# Patient Record
Sex: FEMALE | Race: BLACK OR AFRICAN AMERICAN | Employment: FULL TIME | URBAN - METROPOLITAN AREA
[De-identification: names, ages, dates, MRNs, and addresses within clinical notes are randomized per-mention and may not be internally consistent; named-entity substitution may affect disease eponyms.]

---

## 2017-01-12 NOTE — TELEPHONE ENCOUNTER
She was here in November to see Juanpablo Skinner and this med was started.  She is due in for follow up before any add'l refills-this appt needs to be scheduled with MD

## 2017-01-18 NOTE — TELEPHONE ENCOUNTER
She is due back in for follow up within the next 30 days.  Was in to see Sean for consult in November

## 2017-01-19 ENCOUNTER — TELEPHONE (OUTPATIENT)
Dept: FAMILY MEDICINE CLINIC | Age: 57
End: 2017-01-19

## 2017-01-19 RX ORDER — GLIMEPIRIDE 2 MG/1
2 TABLET ORAL DAILY
Qty: 30 TAB | Refills: 0 | Status: SHIPPED | OUTPATIENT
Start: 2017-01-19 | End: 2017-02-14 | Stop reason: SDUPTHER

## 2017-01-19 RX ORDER — GLIMEPIRIDE 4 MG/1
4 TABLET ORAL DAILY
Qty: 30 TAB | Refills: 0 | Status: SHIPPED | OUTPATIENT
Start: 2017-01-19 | End: 2017-02-14 | Stop reason: DRUGHIGH

## 2017-01-23 NOTE — TELEPHONE ENCOUNTER
Patient has called regarding needing a new prescription for the Jardiance 10 mgs. She states the medication is doing great. Asked her to correspond with Naman Subramanian on My Chart. Patient states she has not been on My Chart but will go on the site this evening after her work day and access the messages from Jeffry Clark. She will also need to schedule a return appt. , with Dr. Dominic Whiteside. Can med refill be done until we can get the patient back in with Dr. Dominic Whiteside, as there are no appts. , available at present for 30 min. Patient #[phone].

## 2017-01-26 NOTE — TELEPHONE ENCOUNTER
Insurance requires a 90 day supply-they will not cover 30.  She is due in now for follow up and this appt needs to be set up  See note from [place] on this same request

## 2017-01-26 NOTE — TELEPHONE ENCOUNTER
Patient calling to say she need a 90 day supply of her jardiance because her insurance will not pay for a 30 day supply. Her contact # is [phone].

## 2017-01-27 NOTE — TELEPHONE ENCOUNTER
Patient has made a follow up appt and her insurance will only cover if done for 90 days-phoned to [facility]

## 2017-02-14 RX ORDER — GLIMEPIRIDE 2 MG/1
TABLET ORAL
Qty: 30 TAB | Refills: 0 | Status: SHIPPED | OUTPATIENT
Start: 2017-02-14 | End: 2017-03-13 | Stop reason: DRUGHIGH

## 2017-03-13 ENCOUNTER — OFFICE VISIT (OUTPATIENT)
Dept: FAMILY MEDICINE CLINIC | Age: 57
End: 2017-03-13

## 2017-03-13 VITALS
WEIGHT: 161.8 LBS | BODY MASS INDEX: 24.52 KG/M2 | SYSTOLIC BLOOD PRESSURE: 134 MMHG | HEART RATE: 72 BPM | DIASTOLIC BLOOD PRESSURE: 87 MMHG | TEMPERATURE: 97.9 F | RESPIRATION RATE: 16 BRPM | HEIGHT: 68 IN | OXYGEN SATURATION: 99 %

## 2017-03-13 DIAGNOSIS — E11.65 UNCONTROLLED TYPE 2 DIABETES MELLITUS WITH HYPERGLYCEMIA, WITHOUT LONG-TERM CURRENT USE OF INSULIN (HCC): Primary | ICD-10-CM

## 2017-03-13 LAB — HBA1C MFR BLD HPLC: 6.6 %

## 2017-03-13 RX ORDER — GLIMEPIRIDE 4 MG/1
TABLET ORAL
COMMUNITY
End: 2017-04-07 | Stop reason: SDUPTHER

## 2017-03-13 NOTE — MR AVS SNAPSHOT
Visit Information Date & Time Provider Department Dept. Phone Encounter #  
 3/13/2017 12:15 PM Puneet Leal MD [facility] [phone] Follow-up Instructions Return in about 6 months (around 9/13/2017). Upcoming Health Maintenance Date Due  
 FOOT EXAM Q1 6/11/2017* EYE EXAM RETINAL OR DILATED Q1 6/11/2017* DTaP/Tdap/Td series (1 - Tdap) 6/16/2018* HEMOGLOBIN A1C Q6M 5/21/2017 MICROALBUMIN Q1 6/22/2017 LIPID PANEL Q1 6/22/2017 BREAST CANCER SCRN MAMMOGRAM 7/1/2018 PAP AKA CERVICAL CYTOLOGY 12/17/2018 COLONOSCOPY 1/4/2020 *Topic was postponed. The date shown is not the original due date. Allergies as of 3/13/2017  Review Complete On: 3/13/2017 By: Sheryle Kill, RN No Known Allergies Current Immunizations  Reviewed on 11/21/2016 Name Date Influenza Vaccine (Quad) PF 11/21/2016 TD Vaccine 6/16/2008 Not reviewed this visit You Were Diagnosed With   
  
 Codes Comments Uncontrolled type 2 diabetes mellitus with hyperglycemia, without long-term current use of insulin ([facility]    -  Primary ICD-10-CM: E11.65 ICD-9-CM: 250.02 Vitals BP Pulse Temp Resp Height(growth percentile) Weight(growth percentile) 134/87 (BP 1 Location: Left arm, BP Patient Position: Sitting) 72 97.9 °F (36.6 °C) (Oral) 16 5' 8\" (1.727 m) 161 lb 12.8 oz (73.4 kg) LMP SpO2 BMI OB Status Smoking Status 05/31/2010 99% 24.6 kg/m2 Postmenopausal Never Smoker Vitals History BMI and BSA Data Body Mass Index Body Surface Area  
 24.6 kg/m 2 1.88 m 2 Preferred Pharmacy Pharmacy Name Phone [id number] [id number] [place] [address] [phone] Your Updated Medication List  
  
   
This list is accurate as of: 3/13/17 12:55 PM.  Always use your most recent med list.  
  
  
  
  
 AMARYL 4 mg tablet Generic drug:  glimepiride Take  by mouth every morning. cyclobenzaprine 5 mg tablet Commonly known as:  FLEXERIL Take 2 Tabs by mouth three (3) times daily (with meals). diphenhydrAMINE 25 mg tablet Commonly known as:  BENADRYL Take 50 mg by mouth every six (6) hours as needed. empagliflozin 10 mg tablet Commonly known as:  Earlis Bone Take 1 Tab by mouth daily. enalapril 10 mg tablet Commonly known as:  VASOTEC  
TAKE 1 TABLET BY MOUTH DAILY  
  
 fluticasone 50 mcg/actuation nasal spray Commonly known as:  FLONASE  
2 sprays per nostril daily  
  
 metFORMIN 500 mg tablet Commonly known as:  GLUCOPHAGE  
TAKE 2 TABLETS BY MOUTH TWICE A DAY WITH MEALS  
  
 OTHER Glucometer test strips-test 2-3 times per week  
  
 triamterene-hydroCHLOROthiazide 37.5-25 mg per tablet Commonly known as:  Brigido Moris TAKE 1 TABLET BY MOUTH DAILY  
  
 VITAMIN D3 1,000 unit Cap Generic drug:  cholecalciferol Take 1,000 Units by mouth daily. We Performed the Following AMB POC HEMOGLOBIN A1C [93563 CPT(R)] Follow-up Instructions Return in about 6 months (around 9/13/2017). Introducing [facility] & HEALTH SERVICES! Dear Olesya Gunn: Thank you for requesting a [employer] account. Our records indicate that you already have an active Qualtrics account. You can access your account anytime at [email or web address] Did you know that you can access your hospital and ER discharge instructions at any time in [employer]? You can also review all of your test results from your hospital stay or ER visit. Additional Information If you have questions, please visit the Frequently Asked Questions section of the Qualt[employer] website at [email or web address] Remember, Qualtrics is NOT to be used for urgent needs. For medical emergencies, dial 911. Now available from your iPhone and Android! Please provide this summary of care documentation to your next provider. Your primary care clinician is listed as [id number] If you have any questions after today's visit, please call [phone].

## 2017-03-13 NOTE — PROGRESS NOTES
Chief Complaint   Patient presents with    Follow-up     Last saw Raphael Bain. Blood sugar at home are 95 to 120. She feels the new medicine [facility] is working well. 1. Have you been to the ER, urgent care clinic since your last visit? Hospitalized since your last visit? No    2. Have you seen or consulted any other health care providers outside of the [address] [place] [address] since your last visit? Include any pap smears or colon screening. No     The patient was counseled on the dangers of tobacco use, and Patient is a non smoker. Reviewed strategies to maximize success, including Continue not to smoke. I have reviewed Health Maintenance with the patient and updated. Advance Care Planning information reviewed and given to the patient.

## 2017-03-13 NOTE — PROGRESS NOTES
Nurse history read and confirmed by patient. Visit Vitals    /87 (BP 1 Location: Left arm, BP Patient Position: Sitting)    Pulse 72    Temp 97.9 °F (36.6 °C) (Oral)    Resp 16    Ht 5' 8\" (1.727 m)    Wt 161 lb 12.8 oz (73.4 kg)    LMP 05/31/2010    SpO2 99%    BMI 24.6 kg/m2       Patient alert and cooperative. Reviewed in office A1c result. Assessment:  1. AODM, controlled. Plan:  1. Recheck in six months. 2. Follow otherwise here prn.  3. Continue current meds at current doses.

## 2017-04-03 RX ORDER — METFORMIN HYDROCHLORIDE 500 MG/1
TABLET ORAL
Qty: 360 TAB | Refills: 0 | Status: SHIPPED | OUTPATIENT
Start: 2017-04-03 | End: 2017-07-05 | Stop reason: SDUPTHER

## 2017-04-07 RX ORDER — GLIMEPIRIDE 2 MG/1
TABLET ORAL
Qty: 90 TAB | Refills: 0 | Status: SHIPPED | OUTPATIENT
Start: 2017-04-07 | End: 2017-07-05 | Stop reason: SDUPTHER

## 2017-04-07 NOTE — TELEPHONE ENCOUNTER
She was just in the office last month and her A1c was much improved.  She is due back in for full annual labs by the end of June

## 2017-04-24 NOTE — TELEPHONE ENCOUNTER
Blood sugar was much better controlled on March A1c on this rx-office visit note from March said recheck in 6 months

## 2017-04-25 RX ORDER — EMPAGLIFLOZIN 10 MG/1
TABLET, FILM COATED ORAL
Qty: 90 TAB | Refills: 1 | Status: SHIPPED | OUTPATIENT
Start: 2017-04-25 | End: 2017-10-16 | Stop reason: SDUPTHER

## 2017-07-05 NOTE — TELEPHONE ENCOUNTER
She was in the office in March and is due back for DM check in September.  Needs to come fasting for this appointment since annual labs are due

## 2017-07-06 NOTE — TELEPHONE ENCOUNTER
Left message on voicemail to call back-per the tech at [facility] she has not filled the 4 mg glimipride recently

## 2017-07-10 RX ORDER — GLIMEPIRIDE 2 MG/1
TABLET ORAL
Qty: 90 TAB | Refills: 0 | OUTPATIENT
Start: 2017-07-10 | End: 2017-10-16 | Stop reason: SDUPTHER

## 2017-07-10 RX ORDER — METFORMIN HYDROCHLORIDE 500 MG/1
TABLET ORAL
Qty: 360 TAB | Refills: 0 | OUTPATIENT
Start: 2017-07-10 | End: 2017-10-16 | Stop reason: SDUPTHER

## 2017-10-16 ENCOUNTER — OFFICE VISIT (OUTPATIENT)
Dept: FAMILY MEDICINE CLINIC | Age: 57
End: 2017-10-16

## 2017-10-16 VITALS
WEIGHT: 151.6 LBS | OXYGEN SATURATION: 100 % | HEIGHT: 68 IN | TEMPERATURE: 98.1 F | RESPIRATION RATE: 16 BRPM | BODY MASS INDEX: 22.97 KG/M2 | HEART RATE: 66 BPM | SYSTOLIC BLOOD PRESSURE: 123 MMHG | DIASTOLIC BLOOD PRESSURE: 78 MMHG

## 2017-10-16 DIAGNOSIS — I10 ESSENTIAL HYPERTENSION: ICD-10-CM

## 2017-10-16 DIAGNOSIS — J06.9 UPPER RESPIRATORY TRACT INFECTION, UNSPECIFIED TYPE: ICD-10-CM

## 2017-10-16 DIAGNOSIS — E55.9 VITAMIN D DEFICIENCY: ICD-10-CM

## 2017-10-16 DIAGNOSIS — E11.65 UNCONTROLLED TYPE 2 DIABETES MELLITUS WITH HYPERGLYCEMIA, WITHOUT LONG-TERM CURRENT USE OF INSULIN (HCC): Primary | ICD-10-CM

## 2017-10-16 DIAGNOSIS — L81.9 HYPOPIGMENTATION: ICD-10-CM

## 2017-10-16 LAB
HBA1C MFR BLD HPLC: 7.2 %
S PYO AG THROAT QL: NEGATIVE
VALID INTERNAL CONTROL?: YES

## 2017-10-16 RX ORDER — GLIMEPIRIDE 2 MG/1
TABLET ORAL
Qty: 90 TAB | Refills: 0 | Status: SHIPPED | OUTPATIENT
Start: 2017-10-16 | End: 2018-01-18 | Stop reason: SDUPTHER

## 2017-10-16 RX ORDER — ENALAPRIL MALEATE 10 MG/1
TABLET ORAL
Qty: 90 TAB | Refills: 3 | Status: SHIPPED | OUTPATIENT
Start: 2017-10-16 | End: 2018-01-17 | Stop reason: SDUPTHER

## 2017-10-16 RX ORDER — TRIAMTERENE/HYDROCHLOROTHIAZID 37.5-25 MG
TABLET ORAL
Qty: 90 TAB | Refills: 3 | Status: SHIPPED | OUTPATIENT
Start: 2017-10-16 | End: 2017-10-18 | Stop reason: SDUPTHER

## 2017-10-16 RX ORDER — METFORMIN HYDROCHLORIDE 500 MG/1
TABLET ORAL
Qty: 360 TAB | Refills: 0 | Status: SHIPPED | OUTPATIENT
Start: 2017-10-16 | End: 2018-01-18 | Stop reason: SDUPTHER

## 2017-10-16 NOTE — LETTER
10/24/2017 2:58 PM 
 
Ms. Ellis Mooney [address] [phone] Dear Ellis Mooney: 
 
Please find your most recent results below. Resulted Orders AMB POC HEMOGLOBIN A1C Result Value Ref Range Hemoglobin A1c (POC) 7.2 % VITAMIN D, 25 HYDROXY Result Value Ref Range VITAMIN D, 25-HYDROXY 36.6 30.0 - 100.0 ng/mL Comment:  
   Vitamin D deficiency has been defined by the [address] [place] [address] practice guideline as a 
level of serum 25-OH vitamin D less than 20 ng/mL (1,2). The Endocrine Society went on to further define vitamin D 
insufficiency as a level between 21 and 29 ng/mL (2). 1. IOM ([place] of Medicine). 2010. Dietary reference 
   intakes for calcium and D. [facility]: The 
   [employer]. 2. [place] MF, Phuc NC, Rick MARK, et al. 
   Evaluation, treatment, and prevention of vitamin D 
   deficiency: an Endocrine Society clinical practice 
   guideline. JCEM. 2011 Jul; 96(7):1911-30. Narrative Performed at:  [address]  [id number] : Erica Whittaker MD, Phone:  [phone] CBC WITH AUTOMATED DIFF Result Value Ref Range WBC 3.8 3.4 - 10.8 x10E3/uL  
 RBC 3.86 3.77 - 5.28 x10E6/uL HGB 11.1 11.1 - 15.9 g/dL HCT 33.4 (L) 34.0 - 46.6 % MCV 87 79 - 97 fL  
 MCH 28.8 26.6 - 33.0 pg  
 MCHC 33.2 31.5 - 35.7 g/dL  
 RDW 13.7 12.3 - 15.4 % PLATELET 798 163 - 476 x10E3/uL NEUTROPHILS 57 Not Estab. % Lymphocytes 27 Not Estab. % MONOCYTES 13 Not Estab. % EOSINOPHILS 2 Not Estab. % BASOPHILS 1 Not Estab. %  
 ABS. NEUTROPHILS 2.2 1.4 - 7.0 x10E3/uL Abs Lymphocytes 1.0 0.7 - 3.1 x10E3/uL  
 ABS. MONOCYTES 0.5 0.1 - 0.9 x10E3/uL  
 ABS. EOSINOPHILS 0.1 0.0 - 0.4 x10E3/uL  
 ABS. BASOPHILS 0.0 0.0 - 0.2 x10E3/uL IMMATURE GRANULOCYTES 0 Not Estab. %  
 ABS. IMM. GRANS. 0.0 0.0 - 0.1 x10E3/uL Narrative Performed at:  [address]  [id number] : Hodan Allen MD, Phone:  [phone] TSH 3RD GENERATION Result Value Ref Range TSH 1.220 0.450 - 4.500 uIU/mL Narrative Performed at:  [address]  [id number] : Hodan Allen MD, Phone:  [phone] METABOLIC PANEL, COMPREHENSIVE Result Value Ref Range Glucose 141 (H) 65 - 99 mg/dL BUN 19 6 - 24 mg/dL Creatinine 0.81 0.57 - 1.00 mg/dL GFR est non-AA 80 >59 mL/min/1.73 GFR est AA 92 >59 mL/min/1.73  
 BUN/Creatinine ratio 23 9 - 23 Sodium 143 134 - 144 mmol/L Potassium 4.2 3.5 - 5.2 mmol/L Chloride 100 96 - 106 mmol/L  
 CO2 27 18 - 29 mmol/L Calcium 9.9 8.7 - 10.2 mg/dL Protein, total 6.8 6.0 - 8.5 g/dL Albumin 4.4 3.5 - 5.5 g/dL GLOBULIN, TOTAL 2.4 1.5 - 4.5 g/dL A-G Ratio 1.8 1.2 - 2.2 Bilirubin, total 0.5 0.0 - 1.2 mg/dL Alk. phosphatase 81 39 - 117 IU/L  
 AST (SGOT) 12 0 - 40 IU/L  
 ALT (SGPT) 9 0 - 32 IU/L Narrative Performed at:  [address]  [id number] : Hodan Allen MD, Phone:  [phone] LIPID PANEL Result Value Ref Range Cholesterol, total 165 100 - 199 mg/dL Triglyceride 60 0 - 149 mg/dL HDL Cholesterol 69 >39 mg/dL VLDL, calculated 12 5 - 40 mg/dL LDL, calculated 84 0 - 99 mg/dL Narrative Performed at:  [address]  [id number] : Hodan Allen MD, Phone:  [phone] AMB POC RAPID STREP A Result Value Ref Range VALID INTERNAL CONTROL POC Yes Group A Strep Ag Negative Negative CVD REPORT Result Value Ref Range INTERPRETATION Note Comment:  
   Medical Director's Note: This is an amended report on 
10/17/2017. The following panels were previously not 
reported: Albumin: Creatinine Ratio. Please review this report in its entirety, since changes may affect result 
interpretation(s) and/or treatment/follow-up suggestions. Supplement report is available. Narrative Performed at:  [address] [place] [address]  [id number] : Juan Serna PhD, Phone:  [phone] DIABETES PATIENT EDUCATION Result Value Ref Range PDF Image Not applicable Narrative Performed at:  [address] [place] [address]  [id number] : Juan Serna PhD, Phone:  [phone] RECOMMENDATIONS: 
Borderline low hemoglobin/hematocrit. Concentrated urine with expected glucosuria. The rest of your labs are ok. Please call me if you have any questions: [phone] Sincerely, Gee Lundy MD

## 2017-10-16 NOTE — LETTER
10/24/2017 2:59 PM 
 
Ms. Kieran Brown [address] [phone] Dear Kieran Brown: 
 
Please find your most recent results below. Resulted Orders AMB POC HEMOGLOBIN A1C Result Value Ref Range Hemoglobin A1c (POC) 7.2 % VITAMIN D, 25 HYDROXY Result Value Ref Range VITAMIN D, 25-HYDROXY 36.6 30.0 - 100.0 ng/mL Comment:  
   Vitamin D deficiency has been defined by the [address] [place] [address] practice guideline as a 
level of serum 25-OH vitamin D less than 20 ng/mL (1,2). The Endocrine Society went on to further define vitamin D 
insufficiency as a level between 21 and 29 ng/mL (2). 1. IOM ([place] of Medicine). 2010. Dietary reference 
   intakes for calcium and D. [facility]: The 
   [employer]. 2. [place] MF, Phuc NC, Rick MARK, et al. 
   Evaluation, treatment, and prevention of vitamin D 
   deficiency: an Endocrine Society clinical practice 
   guideline. JCEM. 2011 Jul; 96(7):1911-30. Narrative Performed at:  [address]  [id number] : Demario Varela MD, Phone:  [phone] CBC WITH AUTOMATED DIFF Result Value Ref Range WBC 3.8 3.4 - 10.8 x10E3/uL  
 RBC 3.86 3.77 - 5.28 x10E6/uL HGB 11.1 11.1 - 15.9 g/dL HCT 33.4 (L) 34.0 - 46.6 % MCV 87 79 - 97 fL  
 MCH 28.8 26.6 - 33.0 pg  
 MCHC 33.2 31.5 - 35.7 g/dL  
 RDW 13.7 12.3 - 15.4 % PLATELET 080 821 - 571 x10E3/uL NEUTROPHILS 57 Not Estab. % Lymphocytes 27 Not Estab. % MONOCYTES 13 Not Estab. % EOSINOPHILS 2 Not Estab. % BASOPHILS 1 Not Estab. %  
 ABS. NEUTROPHILS 2.2 1.4 - 7.0 x10E3/uL Abs Lymphocytes 1.0 0.7 - 3.1 x10E3/uL  
 ABS. MONOCYTES 0.5 0.1 - 0.9 x10E3/uL  
 ABS. EOSINOPHILS 0.1 0.0 - 0.4 x10E3/uL  
 ABS. BASOPHILS 0.0 0.0 - 0.2 x10E3/uL IMMATURE GRANULOCYTES 0 Not Estab. %  
 ABS. IMM. GRANS. 0.0 0.0 - 0.1 x10E3/uL Narrative Performed at:  [address]  [id number] : Ernesto Damon MD, Phone:  [phone] URINALYSIS W/ RFLX MICROSCOPIC Result Value Ref Range Specific Gravity      >=1.030 (A) 1.005 - 1.030  
 pH (UA) 6.0 5.0 - 7.5 Color Yellow Yellow Appearance Clear Clear Leukocyte Esterase Negative Negative Protein Negative Negative/Trace Glucose 3+ (A) Negative Ketone Negative Negative Blood Negative Negative Bilirubin Negative Negative Urobilinogen 0.2 0.2 - 1.0 mg/dL Nitrites Negative Negative Microscopic Examination Comment Comment:  
   Microscopic not indicated and not performed. Narrative Performed at:  [address]  [id number] : Ernesto Damon MD, Phone:  [phone] TSH 3RD GENERATION Result Value Ref Range TSH 1.220 0.450 - 4.500 uIU/mL Narrative Performed at:  [address]  [id number] : Ernesto Damon MD, Phone:  [phone] METABOLIC PANEL, COMPREHENSIVE Result Value Ref Range Glucose 141 (H) 65 - 99 mg/dL BUN 19 6 - 24 mg/dL Creatinine 0.81 0.57 - 1.00 mg/dL GFR est non-AA 80 >59 mL/min/1.73 GFR est AA 92 >59 mL/min/1.73  
 BUN/Creatinine ratio 23 9 - 23 Sodium 143 134 - 144 mmol/L Potassium 4.2 3.5 - 5.2 mmol/L Chloride 100 96 - 106 mmol/L  
 CO2 27 18 - 29 mmol/L Calcium 9.9 8.7 - 10.2 mg/dL Protein, total 6.8 6.0 - 8.5 g/dL Albumin 4.4 3.5 - 5.5 g/dL GLOBULIN, TOTAL 2.4 1.5 - 4.5 g/dL A-G Ratio 1.8 1.2 - 2.2 Bilirubin, total 0.5 0.0 - 1.2 mg/dL Alk. phosphatase 81 39 - 117 IU/L  
 AST (SGOT) 12 0 - 40 IU/L  
 ALT (SGPT) 9 0 - 32 IU/L Narrative Performed at:  [address]  [id number] : Ernesto Damon MD, Phone:  [phone] LIPID PANEL Result Value Ref Range Cholesterol, total 165 100 - 199 mg/dL Triglyceride 60 0 - 149 mg/dL HDL Cholesterol 69 >39 mg/dL VLDL, calculated 12 5 - 40 mg/dL LDL, calculated 84 0 - 99 mg/dL Narrative Performed at:  [address]  [id number] : Edson Foster MD, Phone:  [phone] MICROALBUMIN, UR, RAND W/ MICROALBUMIN/CREA RATIO Result Value Ref Range Creatinine, urine 96.5 Not Estab. mg/dL Microalbumin, urine 3.4 Not Estab. ug/mL Microalb/Creat ratio (ug/mg creat.) 3.5 0.0 - 30.0 mg/g creat Narrative Performed at:  [address]  [id number] : Edson Foster MD, Phone:  [phone] AMB POC RAPID STREP A Result Value Ref Range VALID INTERNAL CONTROL POC Yes Group A Strep Ag Negative Negative CVD REPORT Result Value Ref Range INTERPRETATION Note Comment:  
   Medical Director's Note: This is an amended report on 
10/17/2017. The following panels were previously not 
reported: Albumin: Creatinine Ratio. Please review this 
report in its entirety, since changes may affect result 
interpretation(s) and/or treatment/follow-up suggestions. Supplement report is available. Narrative Performed at:  [address] [place] [address]  [id number] : Juan Serna PhD, Phone:  [phone] DIABETES PATIENT EDUCATION Result Value Ref Range PDF Image Not applicable Narrative Performed at:  [address] [place] [address]  [id number] : Juan Serna PhD, Phone:  [phone] DIABETES PATIENT EDUCATION Result Value Ref Range PDF Image Not applicable Narrative Performed at:  [address] [place] [address]  [id number] : Juan Serna PhD, Phone:  [phone] RECOMMENDATIONS: 
 Borderline low hemoglobin/hematocrit and concentrated urine with expected glucosuria. The rest of your labs are ok. Please call me if you have any questions: [phone] Sincerely, Amy Bustillo MD

## 2017-10-16 NOTE — PROGRESS NOTES
Chief Complaint   Patient presents with    Diabetes     follow up     Tima Hughes is a 59 y.o. female that is here today for a follow up for diabetes, patient c/o rash on right ankle that is itchy x 1 year, also c/o runny nose and left ear pain since yesterday Agus 10/15. 1. Have you been to the ER, urgent care clinic since your last visit? Hospitalized since your last visit? No    2. Have you seen or consulted any other health care providers outside of the [address] [place] [address] since your last visit? Include any pap smears or colon screening.  No

## 2017-10-16 NOTE — MR AVS SNAPSHOT
Visit Information Date & Time Provider Department Dept. Phone Encounter #  
 10/16/2017 10:00 AM Ana Estes MD [facility] [phone] [id number] Follow-up Instructions Return in about 3 months (around 1/16/2018) for [place] A1c check. Upcoming Health Maintenance Date Due  
 FOOT EXAM Q1 12/8/2016 MICROALBUMIN Q1 6/22/2017 LIPID PANEL Q1 6/22/2017 INFLUENZA AGE 9 TO ADULT 8/1/2017 HEMOGLOBIN A1C Q6M 9/13/2017 EYE EXAM RETINAL OR DILATED Q1 11/16/2017* DTaP/Tdap/Td series (1 - Tdap) 6/16/2018* BREAST CANCER SCRN MAMMOGRAM 7/1/2018 PAP AKA CERVICAL CYTOLOGY 12/17/2018 COLONOSCOPY 1/4/2020 *Topic was postponed. The date shown is not the original due date. Allergies as of 10/16/2017  Review Complete On: 10/16/2017 By: Lori Harrell LPN No Known Allergies Current Immunizations  Reviewed on 11/21/2016 Name Date Influenza Vaccine (Quad) PF 11/21/2016 TD Vaccine 6/16/2008 Not reviewed this visit You Were Diagnosed With   
  
 Codes Comments Uncontrolled type 2 diabetes mellitus with hyperglycemia, without long-term current use of insulin ([facility]    -  Primary ICD-10-CM: E11.65 ICD-9-CM: 250.02 Upper respiratory tract infection, unspecified type     ICD-10-CM: J06.9 ICD-9-CM: 465.9 Essential hypertension     ICD-10-CM: I10 
ICD-9-CM: 401.9 Vitamin D deficiency     ICD-10-CM: E55.9 ICD-9-CM: 268.9 Hypopigmentation     ICD-10-CM: L81.9 ICD-9-CM: 709.00 Vitals BP Pulse Temp Resp Height(growth percentile) Weight(growth percentile) 123/78 (BP 1 Location: Left arm, BP Patient Position: Sitting) 66 98.1 °F (36.7 °C) (Oral) 16 5' 8\" (1.727 m) 151 lb 9.6 oz (68.8 kg) LMP SpO2 BMI OB Status Smoking Status 05/31/2010 100% 23.05 kg/m2 Postmenopausal Never Smoker Vitals History BMI and BSA Data Body Mass Index Body Surface Area  23.05 kg/m 2 1.82 m 2  
  
  
 Preferred Pharmacy Pharmacy Name Phone [facility] #[id number] [address] [address] [phone] Your Updated Medication List  
  
   
This list is accurate as of: 10/16/17 10:59 AM.  Always use your most recent med list.  
  
  
  
  
 diphenhydrAMINE 25 mg tablet Commonly known as:  BENADRYL Take 50 mg by mouth every six (6) hours as needed. empagliflozin 10 mg tablet Commonly known as:  JARDIANCE  
TAKE 1 TABLET BY MOUTH EVERY DAY  
  
 enalapril 10 mg tablet Commonly known as:  VASOTEC  
TAKE 1 TABLET BY MOUTH DAILY  
  
 fluticasone 50 mcg/actuation nasal spray Commonly known as:  FLONASE  
2 sprays per nostril daily  
  
 glimepiride 2 mg tablet Commonly known as:  AMARYL  
TAKE 1 TAB BY MOUTH DAILY. metFORMIN 500 mg tablet Commonly known as:  GLUCOPHAGE  
TAKE 2 TABLETS BY MOUTH TWICE A DAY WITH MEALS  
  
 OTHER Glucometer test strips-test 2-3 times per week  
  
 triamterene-hydroCHLOROthiazide 37.5-25 mg per tablet Commonly known as:  Sherry Caal TAKE 1 TABLET BY MOUTH DAILY  
  
 VITAMIN D3 1,000 unit Cap Generic drug:  cholecalciferol Take 1,000 Units by mouth daily. Prescriptions Sent to Pharmacy Refills  
 metFORMIN (GLUCOPHAGE) 500 mg tablet 0 Sig: TAKE 2 TABLETS BY MOUTH TWICE A DAY WITH MEALS Class: Normal  
 Pharmacy: [facility]/pharmacy #[id number] Ph #: [phone]  
 glimepiride (AMARYL) 2 mg tablet 0 Sig: TAKE 1 TAB BY MOUTH DAILY. Class: Normal  
 Pharmacy: [facility]/pharmacy #[id number] Ph #: [phone]  
 empagliflozin (JARDIANCE) 10 mg tablet 0 Sig: TAKE 1 TABLET BY MOUTH EVERY DAY  Class: Normal  
 Pharmacy: [facility]/pharmacy #[id number] Ph #: [phone]  
 triamterene-hydroCHLOROthiazide (MAXZIDE) 37.5-25 mg per tablet 3  
 Sig: TAKE 1 TABLET BY MOUTH DAILY Class: Normal  
 Pharmacy: [facility]/pharmacy #[id number]- [place], [address] [facility] Ph #: [phone]  
 enalapril (VASOTEC) 10 mg tablet 3 Sig: TAKE 1 TABLET BY MOUTH DAILY Class: Normal  
 Pharmacy: [address] [place] [address] , [address] [facility] Ph #: [phone] We Performed the Following AMB POC HEMOGLOBIN A1C [32014 CPT(R)] AMB POC RAPID STREP A [81860 CPT(R)] CBC WITH AUTOMATED DIFF [45875 CPT(R)] LIPID PANEL [70873 CPT(R)] METABOLIC PANEL, COMPREHENSIVE [28006 CPT(R)] MICROALBUMIN, UR, RAND W/ MICROALBUMIN/CREA RATIO [id number] CPT(R)] REFERRAL TO DERMATOLOGY [REF19 Custom] TSH 3RD GENERATION [86252 CPT(R)] URINALYSIS W/ RFLX MICROSCOPIC [56719 CPT(R)] VITAMIN D, 25 HYDROXY [id number] CPT(R)] Follow-up Instructions Return in about 3 months (around 1/16/2018) for [place] A1c check. Referral Information Referral ID Referred By Referred To  
  
 [id number] Thomas Ge DO   
   [address] [facility] Phone: [phone] Fax: [phone] Visits Status Start Date End Date 1 New Request 10/16/17 10/16/18 If your referral has a status of pending review or denied, additional information will be sent to support the outcome of this decision. Introducing [facility] & HEALTH SERVICES! Dear Olesya Gunn: Thank you for requesting a [employer] account. Our records indicate that you already have an active [employer] account. You can access your account anytime at [email or web address] Did you know that you can access your hospital and ER discharge instructions at any time in [employer]? You can also review all of your test results from your hospital stay or ER visit. Additional Information If you have questions, please visit the Frequently Asked Questions section of the [employer] customer insight website at [email or web address] Remember, [employer] customer insight is NOT to be used for urgent needs. For medical emergencies, dial 911. Now available from your iPhone and Android! Please provide this summary of care documentation to your next provider. Your primary care clinician is listed as [address]. If you have any questions after today's visit, please call [phone].

## 2017-10-16 NOTE — PROGRESS NOTES
Pt admits to cheating on diet. Will work on it. Right lower leg area present past yr. Itching past month. Notes nasal congestion, ST, left ear pain, cough since past Friday. Clear mucus. Visit Vitals    /78 (BP 1 Location: Left arm, BP Patient Position: Sitting)    Pulse 66    Temp 98.1 °F (36.7 °C) (Oral)    Resp 16    Ht 5' 8\" (1.727 m)    Wt 151 lb 9.6 oz (68.8 kg)    LMP 05/31/2010    SpO2 100%    BMI 23.05 kg/m2       Patient alert and cooperative. Ear - left canal, TM clear. Mouth - posterior pharynx - anterior pillar erythema. Neck supple, anterior tenderness. Lungs clear. Heart regular. Assessment:  1. Probable URI. Plan:  1. Will check rapid strep. 2. Call if purulence, fever. 3. Reviewed today's A1c. Patient will work on diet. Return for nurse visit in three months for A1c check. 4. Will get annual labs today. 5. Refills done. 6. Follow otherwise here prn.

## 2017-10-17 LAB
25(OH)D3+25(OH)D2 SERPL-MCNC: 36.6 NG/ML (ref 30–100)
ALBUMIN SERPL-MCNC: 4.4 G/DL (ref 3.5–5.5)
ALBUMIN/CREAT UR: 3.5 MG/G CREAT (ref 0–30)
ALBUMIN/GLOB SERPL: 1.8 {RATIO} (ref 1.2–2.2)
ALP SERPL-CCNC: 81 IU/L (ref 39–117)
ALT SERPL-CCNC: 9 IU/L (ref 0–32)
APPEARANCE UR: CLEAR
AST SERPL-CCNC: 12 IU/L (ref 0–40)
BASOPHILS # BLD AUTO: 0 X10E3/UL (ref 0–0.2)
BASOPHILS NFR BLD AUTO: 1 %
BILIRUB SERPL-MCNC: 0.5 MG/DL (ref 0–1.2)
BILIRUB UR QL STRIP: NEGATIVE
BUN SERPL-MCNC: 19 MG/DL (ref 6–24)
BUN/CREAT SERPL: 23 (ref 9–23)
CALCIUM SERPL-MCNC: 9.9 MG/DL (ref 8.7–10.2)
CHLORIDE SERPL-SCNC: 100 MMOL/L (ref 96–106)
CHOLEST SERPL-MCNC: 165 MG/DL (ref 100–199)
CO2 SERPL-SCNC: 27 MMOL/L (ref 18–29)
COLOR UR: YELLOW
CREAT SERPL-MCNC: 0.81 MG/DL (ref 0.57–1)
CREAT UR-MCNC: 96.5 MG/DL
EOSINOPHIL # BLD AUTO: 0.1 X10E3/UL (ref 0–0.4)
EOSINOPHIL NFR BLD AUTO: 2 %
ERYTHROCYTE [DISTWIDTH] IN BLOOD BY AUTOMATED COUNT: 13.7 % (ref 12.3–15.4)
GLOBULIN SER CALC-MCNC: 2.4 G/DL (ref 1.5–4.5)
GLUCOSE SERPL-MCNC: 141 MG/DL (ref 65–99)
GLUCOSE UR QL: ABNORMAL
HCT VFR BLD AUTO: 33.4 % (ref 34–46.6)
HDLC SERPL-MCNC: 69 MG/DL
HGB BLD-MCNC: 11.1 G/DL (ref 11.1–15.9)
HGB UR QL STRIP: NEGATIVE
IMM GRANULOCYTES # BLD: 0 X10E3/UL (ref 0–0.1)
IMM GRANULOCYTES NFR BLD: 0 %
INTERPRETATION, 910389: NORMAL
KETONES UR QL STRIP: NEGATIVE
LDLC SERPL CALC-MCNC: 84 MG/DL (ref 0–99)
LEUKOCYTE ESTERASE UR QL STRIP: NEGATIVE
LYMPHOCYTES # BLD AUTO: 1 X10E3/UL (ref 0.7–3.1)
LYMPHOCYTES NFR BLD AUTO: 27 %
Lab: NORMAL
Lab: NORMAL
MCH RBC QN AUTO: 28.8 PG (ref 26.6–33)
MCHC RBC AUTO-ENTMCNC: 33.2 G/DL (ref 31.5–35.7)
MCV RBC AUTO: 87 FL (ref 79–97)
MICRO URNS: ABNORMAL
MICROALBUMIN UR-MCNC: 3.4 UG/ML
MONOCYTES # BLD AUTO: 0.5 X10E3/UL (ref 0.1–0.9)
MONOCYTES NFR BLD AUTO: 13 %
NEUTROPHILS # BLD AUTO: 2.2 X10E3/UL (ref 1.4–7)
NEUTROPHILS NFR BLD AUTO: 57 %
NITRITE UR QL STRIP: NEGATIVE
PH UR STRIP: 6 [PH] (ref 5–7.5)
PLATELET # BLD AUTO: 305 X10E3/UL (ref 150–379)
POTASSIUM SERPL-SCNC: 4.2 MMOL/L (ref 3.5–5.2)
PROT SERPL-MCNC: 6.8 G/DL (ref 6–8.5)
PROT UR QL STRIP: NEGATIVE
RBC # BLD AUTO: 3.86 X10E6/UL (ref 3.77–5.28)
SODIUM SERPL-SCNC: 143 MMOL/L (ref 134–144)
SP GR UR: >=1.03 (ref 1–1.03)
TRIGL SERPL-MCNC: 60 MG/DL (ref 0–149)
TSH SERPL DL<=0.005 MIU/L-ACNC: 1.22 UIU/ML (ref 0.45–4.5)
UROBILINOGEN UR STRIP-MCNC: 0.2 MG/DL (ref 0.2–1)
VLDLC SERPL CALC-MCNC: 12 MG/DL (ref 5–40)
WBC # BLD AUTO: 3.8 X10E3/UL (ref 3.4–10.8)

## 2017-10-18 RX ORDER — TRIAMTERENE/HYDROCHLOROTHIAZID 37.5-25 MG
TABLET ORAL
Qty: 90 TAB | Refills: 0 | Status: SHIPPED | OUTPATIENT
Start: 2017-10-18 | End: 2018-09-20 | Stop reason: SDUPTHER

## 2017-10-19 NOTE — PROGRESS NOTES
Voice mail message left for patient to return call. No PHI left on message. Upon call back patient to be notified per Dr. Evonne Babcock recommendation: Borderline low hemoglobin/hematocrit and concentrated urine with expected glucosuria. The rest of your labs are ok.

## 2017-10-24 NOTE — PROGRESS NOTES
Voice mail message left for patient to return call. No PHI left on message. Result letter mailed includes PCP recommendations.

## 2017-10-24 NOTE — PROGRESS NOTES
Voice mail message left for patient to return call. No PHI left on message.  Result letter mailed includes PCP recommendations

## 2018-01-17 RX ORDER — ENALAPRIL MALEATE 10 MG/1
TABLET ORAL
Qty: 90 TAB | Refills: 1 | Status: SHIPPED | OUTPATIENT
Start: 2018-01-17 | End: 2018-07-03 | Stop reason: SDUPTHER

## 2018-01-17 NOTE — TELEPHONE ENCOUNTER
PCP: Radha Peres MD    Last appt: 10/16/2017  Future Appointments  Date Time Provider Zack Davis   1/29/2018 9:30 AM Radha Peres MD [facility]       Requested Prescriptions     Pending Prescriptions Disp Refills    enalapril (VASOTEC) 10 mg tablet [Pharmacy Med Name: ENALAPRIL MALEATE 10 MG TAB] 90 Tab 1     Sig: TAKE 1 TABLET BY MOUTH EVERY DAY     Lab Results   Component Value Date/Time    Sodium 143 10/16/2017 11:06 AM    Potassium 4.2 10/16/2017 11:06 AM    Chloride 100 10/16/2017 11:06 AM    CO2 27 10/16/2017 11:06 AM    Anion gap 7 06/14/2010 09:48 AM    Glucose 141 10/16/2017 11:06 AM    BUN 19 10/16/2017 11:06 AM    Creatinine 0.81 10/16/2017 11:06 AM    BUN/Creatinine ratio 23 10/16/2017 11:06 AM    GFR est AA 92 10/16/2017 11:06 AM    GFR est non-AA 80 10/16/2017 11:06 AM    Calcium 9.9 10/16/2017 11:06 AM     Lab Results   Component Value Date/Time    Hemoglobin A1c 8.2 05/13/2015 08:17 AM    Hemoglobin A1c (POC) 7.2 10/16/2017 10:20 AM     Lab Results   Component Value Date/Time    Cholesterol, total 165 10/16/2017 11:06 AM    HDL Cholesterol 69 10/16/2017 11:06 AM    LDL, calculated 84 10/16/2017 11:06 AM    VLDL, calculated 12 10/16/2017 11:06 AM    Triglyceride 60 10/16/2017 11:06 AM    CHOL/HDL Ratio 2.4 06/14/2010 09:48 AM     Lab Results   Component Value Date/Time    TSH 1.220 10/16/2017 11:06 AM

## 2018-01-18 DIAGNOSIS — E11.65 UNCONTROLLED TYPE 2 DIABETES MELLITUS WITH HYPERGLYCEMIA, WITHOUT LONG-TERM CURRENT USE OF INSULIN (HCC): ICD-10-CM

## 2018-01-18 RX ORDER — GLIMEPIRIDE 2 MG/1
TABLET ORAL
Qty: 90 TAB | Refills: 0 | Status: SHIPPED | OUTPATIENT
Start: 2018-01-18 | End: 2018-02-20 | Stop reason: SDUPTHER

## 2018-01-18 RX ORDER — METFORMIN HYDROCHLORIDE 500 MG/1
TABLET ORAL
Qty: 360 TAB | Refills: 0 | Status: SHIPPED | OUTPATIENT
Start: 2018-01-18 | End: 2018-04-23 | Stop reason: SDUPTHER

## 2018-01-18 RX ORDER — EMPAGLIFLOZIN 10 MG/1
TABLET, FILM COATED ORAL
Qty: 90 TAB | Refills: 0 | Status: SHIPPED | OUTPATIENT
Start: 2018-01-18 | End: 2018-04-23 | Stop reason: SDUPTHER

## 2018-01-18 NOTE — TELEPHONE ENCOUNTER
PCP: Danielle Aragon MD    Last appt: 10/16/2017  Future Appointments  Date Time Provider Zack Davis   1/29/2018 9:30 AM Danielle Aragon MD [facility]       Requested Prescriptions     Pending Prescriptions Disp Refills    metFORMIN (GLUCOPHAGE) 500 mg tablet [Pharmacy Med Name: METFORMIN  MG TABLET] 360 Tab 0     Sig: TAKE 2 TABLETS BY MOUTH TWICE A DAY WITH MEALS    glimepiride (AMARYL) 2 mg tablet [Pharmacy Med Name: GLIMEPIRIDE 2 MG TABLET] 90 Tab 0     Sig: TAKE 1 TABLET BY MOUTH EVERY DAY    JARDIANCE 10 mg tablet [Pharmacy Med Name: Alfredo Naman 10 MG TABLET] 90 Tab 0     Sig: TAKE 1 TABLET BY MOUTH EVERY DAY     Lab Results   Component Value Date/Time    Sodium 143 10/16/2017 11:06 AM    Potassium 4.2 10/16/2017 11:06 AM    Chloride 100 10/16/2017 11:06 AM    CO2 27 10/16/2017 11:06 AM    Anion gap 7 06/14/2010 09:48 AM    Glucose 141 10/16/2017 11:06 AM    BUN 19 10/16/2017 11:06 AM    Creatinine 0.81 10/16/2017 11:06 AM    BUN/Creatinine ratio 23 10/16/2017 11:06 AM    GFR est AA 92 10/16/2017 11:06 AM    GFR est non-AA 80 10/16/2017 11:06 AM    Calcium 9.9 10/16/2017 11:06 AM     Lab Results   Component Value Date/Time    Hemoglobin A1c 8.2 05/13/2015 08:17 AM    Hemoglobin A1c (POC) 7.2 10/16/2017 10:20 AM     Lab Results   Component Value Date/Time    Cholesterol, total 165 10/16/2017 11:06 AM    HDL Cholesterol 69 10/16/2017 11:06 AM    LDL, calculated 84 10/16/2017 11:06 AM    VLDL, calculated 12 10/16/2017 11:06 AM    Triglyceride 60 10/16/2017 11:06 AM    CHOL/HDL Ratio 2.4 06/14/2010 09:48 AM     Lab Results   Component Value Date/Time    TSH 1.220 10/16/2017 11:06 AM

## 2018-01-29 ENCOUNTER — CLINICAL SUPPORT (OUTPATIENT)
Dept: FAMILY MEDICINE CLINIC | Age: 58
End: 2018-01-29

## 2018-01-29 VITALS
WEIGHT: 157.2 LBS | RESPIRATION RATE: 16 BRPM | HEIGHT: 68 IN | DIASTOLIC BLOOD PRESSURE: 82 MMHG | OXYGEN SATURATION: 99 % | SYSTOLIC BLOOD PRESSURE: 127 MMHG | BODY MASS INDEX: 23.82 KG/M2 | HEART RATE: 78 BPM | TEMPERATURE: 98.7 F

## 2018-01-29 DIAGNOSIS — E11.65 UNCONTROLLED TYPE 2 DIABETES MELLITUS WITH HYPERGLYCEMIA, WITHOUT LONG-TERM CURRENT USE OF INSULIN (HCC): Primary | ICD-10-CM

## 2018-01-29 LAB — HBA1C MFR BLD HPLC: 6.7 %

## 2018-01-29 NOTE — MR AVS SNAPSHOT
[facility] 
 
 
 [address] [facility] [address] 
[address] [place] [address] 
[phone] Patient: Memo Fletcher MRN:  [id number] Visit Information Date & Time Provider Department Dept. Phone Encounter #  
 1/29/2018  9:30 AM Chilo Valdez MD [facility] [phone] [id number] Follow-up Instructions Return in about 3 months (around 4/29/2018) for Wellness check. Upcoming Health Maintenance Date Due  
 FOOT EXAM Q1 12/8/2016 Influenza Age 5 to Adult 8/1/2017 EYE EXAM RETINAL OR DILATED Q1 4/29/2018* DTaP/Tdap/Td series (1 - Tdap) 6/16/2018* HEMOGLOBIN A1C Q6M 4/16/2018 MICROALBUMIN Q1 10/16/2018 LIPID PANEL Q1 10/16/2018 PAP AKA CERVICAL CYTOLOGY 12/17/2018 BREAST CANCER SCRN MAMMOGRAM 11/20/2019 COLONOSCOPY 1/4/2020 *Topic was postponed. The date shown is not the original due date. Allergies as of 1/29/2018  Review Complete On: 1/29/2018 By: Seamus Dietz RN No Known Allergies Current Immunizations  Reviewed on 11/21/2016 Name Date Influenza Vaccine (Quad) PF 11/21/2016 TD Vaccine 6/16/2008 Not reviewed this visit You Were Diagnosed With   
  
 Codes Comments Uncontrolled type 2 diabetes mellitus with hyperglycemia, without long-term current use of insulin ([facility]    -  Primary ICD-10-CM: E11.65 ICD-9-CM: 250.02 Vitals BP Pulse Temp Height(growth percentile) Weight(growth percentile) LMP  
 127/82 (BP 1 Location: Left arm, BP Patient Position: Sitting) 78 98.7 °F (37.1 °C) (Oral) 5' 8\" (1.727 m) 157 lb 3.2 oz (71.3 kg) 05/31/2010 SpO2 BMI OB Status Smoking Status 99% 23.9 kg/m2 Postmenopausal Never Smoker Vitals History BMI and BSA Data Body Mass Index Body Surface Area  
 23.9 kg/m 2 1.85 m 2 Preferred Pharmacy Pharmacy Name Phone  [id number] [address] AT [employer] [place] [phone] Your Updated Medication List  
  
   
This list is accurate as of: 1/29/18 10:01 AM.  Always use your most recent med list.  
  
  
  
  
 diphenhydrAMINE 25 mg tablet Commonly known as:  BENADRYL Take 50 mg by mouth every six (6) hours as needed. enalapril 10 mg tablet Commonly known as:  VASOTEC  
TAKE 1 TABLET BY MOUTH EVERY DAY  
  
 fluticasone 50 mcg/actuation nasal spray Commonly known as:  FLONASE  
2 sprays per nostril daily  
  
 glimepiride 2 mg tablet Commonly known as:  AMARYL  
TAKE 1 TABLET BY MOUTH EVERY DAY  
  
 JARDIANCE 10 mg tablet Generic drug:  empagliflozin TAKE 1 TABLET BY MOUTH EVERY DAY  
  
 metFORMIN 500 mg tablet Commonly known as:  GLUCOPHAGE  
TAKE 2 TABLETS BY MOUTH TWICE A DAY WITH MEALS  
  
 OTHER Glucometer test strips-test 2-3 times per week  
  
 triamterene-hydroCHLOROthiazide 37.5-25 mg per tablet Commonly known as:  Roselee Clapper TAKE 1 TABLET BY MOUTH DAILY  
  
 VITAMIN D3 1,000 unit Cap Generic drug:  cholecalciferol Take 1,000 Units by mouth daily. We Performed the Following AMB POC HEMOGLOBIN A1C [80351 CPT(R)] Follow-up Instructions Return in about 3 months (around 4/29/2018) for Wellness check. Introducing [facility] & HEALTH SERVICES! Dear David Nova: Thank you for requesting a Nervog[employer] account. Our records indicate that you already have an active Nervogrid account. You can access your account anytime at [email or web address] Did you know that you can access your hospital and ER discharge instructions at any time in Nervogrid? You can also review all of your test results from your hospital stay or ER visit. Additional Information If you have questions, please visit the Frequently Asked Questions section of the Nervog[employer] website at [email or web address] Remember, Nervogrid is NOT to be used for urgent needs. For medical emergencies, dial 911. Now available from your iPhone and Android! Please provide this summary of care documentation to your next provider. Your primary care clinician is listed as [id number] If you have any questions after today's visit, please call [phone].

## 2018-01-29 NOTE — PROGRESS NOTES
Asa Reyes  Identified pt with two pt identifiers(name and ). Chief Complaint   Patient presents with    Diabetes     recheck Hgb A1c       1. Have you been to the ER, urgent care clinic since your last visit? Hospitalized since your last visit? NO    2. Have you seen or consulted any other health care providers outside of the [address] [place] [address] since your last visit? Include any pap smears or colon screening. YES 16 had eye exam/    Today's provider has been notified of reason for visit, vitals and flowsheets obtained on patients.      Patient received paperwork for advance directive during previous visit but has not completed at this time     Reviewed record In preparation for visit, huddled with provider and have obtained necessary documentation      Health Maintenance Due   Topic    EYE EXAM RETINAL OR DILATED Q1     FOOT EXAM Q1     Influenza Age 5 to Adult        Wt Readings from Last 3 Encounters:   18 157 lb 3.2 oz (71.3 kg)   10/16/17 151 lb 9.6 oz (68.8 kg)   17 161 lb 12.8 oz (73.4 kg)     Temp Readings from Last 3 Encounters:   10/16/17 98.1 °F (36.7 °C) (Oral)   17 97.9 °F (36.6 °C) (Oral)   16 98.3 °F (36.8 °C)     BP Readings from Last 3 Encounters:   10/16/17 123/78   17 134/87   16 134/84     Pulse Readings from Last 3 Encounters:   10/16/17 66   17 72   16 68     Vitals:    18 0941   Weight: 157 lb 3.2 oz (71.3 kg)   Height: 5' 8\" (1.727 m)   PainSc:   0 - No pain   LMP: 2010         Learning Assessment:  :     Learning Assessment 2014   PRIMARY LEARNER Patient   HIGHEST LEVEL OF EDUCATION - PRIMARY LEARNER  GRADUATED HIGH SCHOOL OR GED   PRIMARY LANGUAGE ENGLISH   LEARNER PREFERENCE PRIMARY PICTURES     VIDEOS     READING   ANSWERED BY patient   RELATIONSHIP SELF       Depression Screening:  :     PHQ over the last two weeks 10/16/2017   Little interest or pleasure in doing things Not at all   Feeling down, depressed or hopeless Not at all   Total Score PHQ 2 0       Fall Risk Assessment:  :     No flowsheet data found. Abuse Screening:  :     Abuse Screening Questionnaire 10/16/2017   Do you ever feel afraid of your partner? N   Are you in a relationship with someone who physically or mentally threatens you? N   Is it safe for you to go home? Y       ADL Screening:  :     No flowsheet data found. Medication reconciliation up to date and corrected with patient at this time.

## 2018-04-19 NOTE — TELEPHONE ENCOUNTER
Placed an outgoing call to patient as patient has not followed up on mychart messages as agreed upon during last visit when Víctor Mark was started. Left VM for patient that follow-up was needed and for patient to make an appointment.     Dane Choi, PharmD, [facility] Vitamin D dent to pharmacy

## 2018-04-23 DIAGNOSIS — E11.65 UNCONTROLLED TYPE 2 DIABETES MELLITUS WITH HYPERGLYCEMIA, WITHOUT LONG-TERM CURRENT USE OF INSULIN (HCC): ICD-10-CM

## 2018-04-24 RX ORDER — METFORMIN HYDROCHLORIDE 500 MG/1
TABLET ORAL
Qty: 360 TAB | Refills: 0 | Status: SHIPPED | OUTPATIENT
Start: 2018-04-24 | End: 2018-07-03 | Stop reason: SDUPTHER

## 2018-04-24 RX ORDER — EMPAGLIFLOZIN 10 MG/1
TABLET, FILM COATED ORAL
Qty: 90 TAB | Refills: 0 | Status: SHIPPED | OUTPATIENT
Start: 2018-04-24 | End: 2018-07-03 | Stop reason: SDUPTHER

## 2018-04-24 NOTE — TELEPHONE ENCOUNTER
PCP: Hayden Whalen MD    Last appt: 1/29/2018  Future Appointments  Date Time Provider Zack Davis   5/1/2018 9:00 AM Hayden Whalen MD [facility] [address]       Requested Prescriptions     Pending Prescriptions Disp Refills    JARDIANCE 10 mg tablet [Pharmacy Med Name: Angela Maker 10 MG TABLET] 90 Tab 0     Sig: TAKE 1 TABLET BY MOUTH EVERY DAY    metFORMIN (GLUCOPHAGE) 500 mg tablet [Pharmacy Med Name: METFORMIN  MG TABLET] 360 Tab 0     Sig: TAKE 2 TABLETS BY MOUTH TWICE A DAY WITH MEALS     Lab Results   Component Value Date/Time    Sodium 143 10/16/2017 11:06 AM    Potassium 4.2 10/16/2017 11:06 AM    Chloride 100 10/16/2017 11:06 AM    CO2 27 10/16/2017 11:06 AM    Anion gap 7 06/14/2010 09:48 AM    Glucose 141 (H) 10/16/2017 11:06 AM    BUN 19 10/16/2017 11:06 AM    Creatinine 0.81 10/16/2017 11:06 AM    BUN/Creatinine ratio 23 10/16/2017 11:06 AM    GFR est AA 92 10/16/2017 11:06 AM    GFR est non-AA 80 10/16/2017 11:06 AM    Calcium 9.9 10/16/2017 11:06 AM     Lab Results   Component Value Date/Time    Hemoglobin A1c 8.2 (H) 05/13/2015 08:17 AM    Hemoglobin A1c (POC) 6.7 01/29/2018 09:58 AM     Lab Results   Component Value Date/Time    Cholesterol, total 165 10/16/2017 11:06 AM    HDL Cholesterol 69 10/16/2017 11:06 AM    LDL, calculated 84 10/16/2017 11:06 AM    VLDL, calculated 12 10/16/2017 11:06 AM    Triglyceride 60 10/16/2017 11:06 AM    CHOL/HDL Ratio 2.4 06/14/2010 09:48 AM     Lab Results   Component Value Date/Time    TSH 1.220 10/16/2017 11:06 AM

## 2018-09-20 ENCOUNTER — OFFICE VISIT (OUTPATIENT)
Dept: FAMILY MEDICINE CLINIC | Age: 58
End: 2018-09-20

## 2018-09-20 VITALS
HEIGHT: 68 IN | TEMPERATURE: 97.4 F | SYSTOLIC BLOOD PRESSURE: 131 MMHG | RESPIRATION RATE: 18 BRPM | BODY MASS INDEX: 23.31 KG/M2 | DIASTOLIC BLOOD PRESSURE: 83 MMHG | OXYGEN SATURATION: 99 % | WEIGHT: 153.8 LBS | HEART RATE: 74 BPM

## 2018-09-20 DIAGNOSIS — E11.65 UNCONTROLLED TYPE 2 DIABETES MELLITUS WITH HYPERGLYCEMIA, WITHOUT LONG-TERM CURRENT USE OF INSULIN (HCC): Primary | ICD-10-CM

## 2018-09-20 DIAGNOSIS — E55.9 VITAMIN D DEFICIENCY: ICD-10-CM

## 2018-09-20 DIAGNOSIS — I10 ESSENTIAL HYPERTENSION: ICD-10-CM

## 2018-09-20 DIAGNOSIS — Z23 ENCOUNTER FOR IMMUNIZATION: ICD-10-CM

## 2018-09-20 LAB — HBA1C MFR BLD HPLC: 7.1 %

## 2018-09-20 RX ORDER — FLUTICASONE PROPIONATE 50 MCG
SPRAY, SUSPENSION (ML) NASAL
Qty: 1 BOTTLE | Refills: 0 | Status: CANCELLED | OUTPATIENT
Start: 2018-09-20

## 2018-09-20 RX ORDER — GLIMEPIRIDE 2 MG/1
TABLET ORAL
Qty: 90 TAB | Refills: 1 | Status: SHIPPED | OUTPATIENT
Start: 2018-09-20 | End: 2019-03-05 | Stop reason: SDUPTHER

## 2018-09-20 RX ORDER — DIPHENHYDRAMINE HCL 25 MG
50 TABLET ORAL
Status: CANCELLED | OUTPATIENT
Start: 2018-09-20

## 2018-09-20 RX ORDER — METFORMIN HYDROCHLORIDE 500 MG/1
TABLET ORAL
Qty: 360 TAB | Refills: 1 | Status: SHIPPED | OUTPATIENT
Start: 2018-09-20 | End: 2019-03-20 | Stop reason: SDUPTHER

## 2018-09-20 RX ORDER — ENALAPRIL MALEATE 10 MG/1
TABLET ORAL
Qty: 90 TAB | Refills: 1 | Status: SHIPPED | OUTPATIENT
Start: 2018-09-20 | End: 2019-03-20 | Stop reason: SDUPTHER

## 2018-09-20 RX ORDER — TRIAMTERENE/HYDROCHLOROTHIAZID 37.5-25 MG
TABLET ORAL
Qty: 90 TAB | Refills: 1 | Status: SHIPPED | OUTPATIENT
Start: 2018-09-20 | End: 2019-03-20 | Stop reason: SDUPTHER

## 2018-09-20 RX ORDER — GLUCOSAMINE SULFATE 1500 MG
1000 POWDER IN PACKET (EA) ORAL DAILY
Status: CANCELLED | OUTPATIENT
Start: 2018-09-20

## 2018-09-20 NOTE — PROGRESS NOTES
Off diet. Walks at work. Had Shingles. Needs refills. Labs due next month. Visit Vitals  /83 (BP 1 Location: Left arm, BP Patient Position: Sitting)  Pulse 74  Temp 97.4 °F (36.3 °C) (Oral)  Resp 18  Ht 5' 8\" (1.727 m)  Wt 153 lb 12.8 oz (69.8 kg)  LMP 05/31/2010  SpO2 99%  BMI 23.39 kg/m2 Patient alert and cooperative. Reviewed above. Foot exam as below. Assessment: 
1. AODM, borderline uncontrolled. Patient admits to being off diet. Plan: 1. Return in six months for recheck. 2. Refilled meds. 3. Return next month for annual blood work. 4. TDAP given. 5. Follow otherwise here prn. Diabetic foot exam performed by Venkatesh Odonnell MD  
 
Measurement  Response Nurse Comment Physician Comment Monofilament  R - normal sensation with micro filament L - normal sensation with micro filament Pulse DP R - 2+ (normal) L - 2+ (normal) Pulse TP R - 2+ (normal) L - 2+ (normal) Structural deformity R - None L - None Skin Integrity / Deformity R - Mild - Great toe fungus L - Mild - Same Reviewed by:

## 2018-09-20 NOTE — MR AVS SNAPSHOT
[address] [place] [address] 
[address] [facility] [address] 
[phone] Patient: Adonis Angelucci MRN:  [id number] Visit Information Date & Time Provider Department Dept. Phone Encounter #  
 9/20/2018  9:40 AM Jorge Lozano MD [facility] [phone] [id number] Follow-up Instructions Return in about 4 weeks (around 10/18/2018) for Annual labs. Routing History Upcoming Health Maintenance Date Due DTaP/Tdap/Td series (1 - Tdap) 6/17/2008 FOOT EXAM Q1 12/8/2016 ZOSTER VACCINE AGE 60> 4/15/2018 HEMOGLOBIN A1C Q6M 7/29/2018 PAP AKA CERVICAL CYTOLOGY 12/17/2018 EYE EXAM RETINAL OR DILATED Q1 10/20/2018* MICROALBUMIN Q1 11/20/2018* LIPID PANEL Q1 11/20/2018* Influenza Age 5 to Adult 3/31/2019* BREAST CANCER SCRN MAMMOGRAM 11/20/2019 COLONOSCOPY 1/4/2020 *Topic was postponed. The date shown is not the original due date. Allergies as of 9/20/2018  Review Complete On: 9/20/2018 By: Jorge Lozano MD  
 No Known Allergies Current Immunizations  Reviewed on 11/21/2016 Name Date Influenza Vaccine (Quad) PF 11/21/2016 TD Vaccine 6/16/2008 Tdap  Incomplete Not reviewed this visit You Were Diagnosed With   
  
 Codes Comments Uncontrolled type 2 diabetes mellitus with hyperglycemia, without long-term current use of insulin ([facility]    -  Primary ICD-10-CM: E11.65 ICD-9-CM: 250.02 Essential hypertension     ICD-10-CM: I10 
ICD-9-CM: 401.9 Vitamin D deficiency     ICD-10-CM: E55.9 ICD-9-CM: 268.9 Encounter for immunization     ICD-10-CM: E87 ICD-9-CM: V03.89 Vitals BP Pulse Temp Resp Height(growth percentile) Weight(growth percentile) 131/83 (BP 1 Location: Left arm, BP Patient Position: Sitting) 74 97.4 °F (36.3 °C) (Oral) 18 5' 8\" (1.727 m) 153 lb 12.8 oz (69.8 kg) LMP SpO2 BMI OB Status Smoking Status 05/31/2010 99% 23.39 kg/m2 Postmenopausal Never Smoker BMI and BSA Data Body Mass Index Body Surface Area  
 23.39 kg/m 2 1.83 m 2 [id number] [id number] [place] [address] [phone] Your Updated Medication List  
  
   
This list is accurate as of 9/20/18 10:24 AM.  Always use your most recent med list.  
  
  
  
  
 diphenhydrAMINE 25 mg tablet Commonly known as:  BENADRYL Take 50 mg by mouth every six (6) hours as needed. empagliflozin 10 mg tablet Commonly known as:  JARDIANCE  
TAKE 1 TABLET BY MOUTH EVERY DAY  
  
 enalapril 10 mg tablet Commonly known as:  VASOTEC  
TAKE 1 TABLET BY MOUTH EVERY DAY  
  
 fluticasone 50 mcg/actuation nasal spray Commonly known as:  FLONASE  
2 sprays per nostril daily  
  
 glimepiride 2 mg tablet Commonly known as:  AMARYL  
TAKE 1 TABLET BY MOUTH EVERY DAY  
  
 metFORMIN 500 mg tablet Commonly known as:  GLUCOPHAGE  
TAKE 2 TABLETS BY MOUTH TWICE A DAY WITH MEALS  
  
 OTHER Glucometer test strips-test 2-3 times per week  
  
 triamterene-hydroCHLOROthiazide 37.5-25 mg per tablet Commonly known as:  Aundrea Ruse TAKE 1 TABLET BY MOUTH DAILY  
  
 VITAMIN D3 1,000 unit Cap Generic drug:  cholecalciferol Take 1,000 Units by mouth daily. Prescriptions Sent to Pharmacy Refills  
 metFORMIN (GLUCOPHAGE) 500 mg tablet 1 Sig: TAKE 2 TABLETS BY MOUTH TWICE A DAY WITH MEALS Class: Normal  
 Pharmacy: [facility]/pharmacy #[id number] Ph #: [phone]  
 empagliflozin (JARDIANCE) 10 mg tablet 1 Sig: TAKE 1 TABLET BY MOUTH EVERY DAY Class: Normal  
 Pharmacy: [facility]/pharmacy #[id number] Ph #: [phone]  
 enalapril (VASOTEC) 10 mg tablet 1 Sig: TAKE 1 TABLET BY MOUTH EVERY DAY  Class: Normal  
 Pharmacy: [facility]/pharmacy #[id number] Ph #: [phone]  
 glimepiride (AMARYL) 2 mg tablet 1 Sig: TAKE 1 TABLET BY MOUTH EVERY DAY Class: Normal  
 Pharmacy: [facility]/pharmacy #[id number] Ph #: [phone]  
 triamterene-hydroCHLOROthiazide (MAXZIDE) 37.5-25 mg per tablet 1 Sig: TAKE 1 TABLET BY MOUTH DAILY Class: Normal  
 Pharmacy: [address] [place] [address] , [address] [place] [address] Ph #: [phone] We Performed the Following AMB POC HEMOGLOBIN A1C [12988 CPT(R)] CBC WITH AUTOMATED DIFF [16322 CPT(R)]  DIABETES FOOT EXAM [HM7 Custom] LIPID PANEL [66995 CPT(R)] METABOLIC PANEL, COMPREHENSIVE [33474 CPT(R)] MICROALBUMIN, UR, RAND W/ MICROALB/CREAT RATIO [id number] CPT(R)] RI IMMUNIZ ADMIN,1 SINGLE/COMB VAC/TOXOID [id number] CPT(R)] TETANUS, DIPHTHERIA TOXOIDS AND ACELLULAR PERTUSSIS VACCINE (TDAP), IN INDIVIDS. >=7, IM [id number] CPT(R)] TSH 3RD GENERATION [20728 CPT(R)] URINALYSIS W/ RFLX MICROSCOPIC [05830 CPT(R)] VITAMIN D, 25 HYDROXY [id number] CPT(R)] Follow-up Instructions Return in about 4 weeks (around 10/18/2018) for Annual labs. Introducing [facility] & HEALTH SERVICES! Dear Yahir Linares: Thank you for requesting a [employer] account. Our records indicate that you already have an active [employer] account. You can access your account anytime at [email or web address] Did you know that you can access your hospital and ER discharge instructions at any time in AMOtech? You can also review all of your test results from your hospital stay or ER visit. Additional Information If you have questions, please visit the Frequently Asked Questions section of the AMOtech website at [email or web address] Remember, AMOtech is NOT to be used for urgent needs. For medical emergencies, dial 911. Now available from your iPhone and Android! Please provide this summary of care documentation to your next provider. Your primary care clinician is listed as [id number] If you have any questions after today's visit, please call [phone].

## 2018-09-20 NOTE — PROGRESS NOTES
Kenneth Kohli  Identified pt with two pt identifiers(name and ). No chief complaint on file. 1. Have you been to the ER, urgent care clinic since your last visit? Hospitalized since your last visit? no 
 
2. Have you seen or consulted any other health care providers outside of the [address] [place] [address] since your last visit? Include any pap smears or colon screening. no 
 
 
Advance Care Planning In the event something were to happen to you and you were unable to speak on your behalf, do you have an Advance Directive/ Living Will in place stating your wishes? no If yes, do we have a copy on file no If no, would you like information yes Medication reconciliation up to date and corrected with patient at this time. Today's provider has been notified of reason for visit, vitals and flowsheets obtained on patients. Reviewed record in preparation for visit, huddled with provider and have obtained necessary documentation. Health Maintenance Due Topic  DTaP/Tdap/Td series (1 - Tdap)  EYE EXAM RETINAL OR DILATED Q1   
 FOOT EXAM Q1   
 ZOSTER VACCINE AGE 60>   
 HEMOGLOBIN A1C Q6M   
 Influenza Age 5 to Adult  MICROALBUMIN Q1   
 LIPID PANEL Q1   
 PAP AKA CERVICAL CYTOLOGY Wt Readings from Last 3 Encounters:  
18 153 lb 12.8 oz (69.8 kg) 18 157 lb 3.2 oz (71.3 kg) 10/16/17 151 lb 9.6 oz (68.8 kg) Temp Readings from Last 3 Encounters:  
18 97.4 °F (36.3 °C) (Oral) 18 98.7 °F (37.1 °C) (Oral) 10/16/17 98.1 °F (36.7 °C) (Oral) BP Readings from Last 3 Encounters:  
18 131/83  
18 127/82  
10/16/17 123/78 Pulse Readings from Last 3 Encounters:  
18 74  
18 78  
10/16/17 66 Vitals:  
 18 8249 BP: 131/83 Pulse: 74 Resp: 18 Temp: 97.4 °F (36.3 °C) TempSrc: Oral  
SpO2: 99% Weight: 153 lb 12.8 oz (69.8 kg) Height: 5' 8\" (1.727 m) PainSc:   0 - No pain LMP: 2010 Learning Assessment: 
:  
 
Learning Assessment 6/18/2014 PRIMARY LEARNER Patient HIGHEST LEVEL OF EDUCATION - PRIMARY LEARNER  GRADUATED HIGH SCHOOL OR GED PRIMARY LANGUAGE ENGLISH  
LEARNER PREFERENCE PRIMARY PICTURES  
  VIDEOS READING  
ANSWERED BY patient RELATIONSHIP SELF Depression Screening: 
:  
 
PHQ over the last two weeks 9/20/2018 Little interest or pleasure in doing things Not at all Feeling down, depressed, irritable, or hopeless Not at all Total Score PHQ 2 0 Fall Risk Assessment: 
:  
 
No flowsheet data found. Abuse Screening: 
:  
 
Abuse Screening Questionnaire 9/20/2018 10/16/2017 Do you ever feel afraid of your partner? Bernice Bang Are you in a relationship with someone who physically or mentally threatens you? Bernice Bang Is it safe for you to go home? Y Y  
 
 
ADL Screening: 
:  
 
ADL Assessment 1/29/2018 Feeding yourself No Help Needed Getting from bed to chair No Help Needed Getting dressed No Help Needed Bathing or showering No Help Needed Walk across the room (includes cane/walker) No Help Needed Using the telphone No Help Needed Taking your medications No Help Needed Preparing meals No Help Needed Managing money (expenses/bills) No Help Needed Moderately strenuous housework (laundry) No Help Needed Shopping for personal items (toiletries/medicines) No Help Needed Shopping for groceries No Help Needed Driving No Help Needed Climbing a flight of stairs No Help Needed Getting to places beyond walking distances No Help Needed Medication reconciliation up to date and corrected with patient at this time.

## 2019-03-20 ENCOUNTER — OFFICE VISIT (OUTPATIENT)
Dept: FAMILY MEDICINE CLINIC | Age: 59
End: 2019-03-20

## 2019-03-20 VITALS
WEIGHT: 157.6 LBS | BODY MASS INDEX: 23.89 KG/M2 | HEART RATE: 70 BPM | SYSTOLIC BLOOD PRESSURE: 142 MMHG | OXYGEN SATURATION: 100 % | DIASTOLIC BLOOD PRESSURE: 82 MMHG | RESPIRATION RATE: 20 BRPM | HEIGHT: 68 IN | TEMPERATURE: 97.2 F

## 2019-03-20 DIAGNOSIS — I10 ESSENTIAL HYPERTENSION: ICD-10-CM

## 2019-03-20 DIAGNOSIS — E11.65 UNCONTROLLED TYPE 2 DIABETES MELLITUS WITH HYPERGLYCEMIA, WITHOUT LONG-TERM CURRENT USE OF INSULIN (HCC): Primary | ICD-10-CM

## 2019-03-20 DIAGNOSIS — R49.0 PERSISTENT HOARSENESS: ICD-10-CM

## 2019-03-20 LAB — HBA1C MFR BLD HPLC: 7.6 %

## 2019-03-20 RX ORDER — METFORMIN HYDROCHLORIDE 500 MG/1
TABLET ORAL
Qty: 360 TAB | Refills: 0 | Status: SHIPPED | OUTPATIENT
Start: 2019-03-20 | End: 2019-07-17 | Stop reason: SDUPTHER

## 2019-03-20 RX ORDER — GLIMEPIRIDE 2 MG/1
2 TABLET ORAL DAILY
Qty: 90 TAB | Refills: 0 | Status: SHIPPED | OUTPATIENT
Start: 2019-03-20 | End: 2019-05-22 | Stop reason: SDUPTHER

## 2019-03-20 RX ORDER — ENALAPRIL MALEATE 10 MG/1
TABLET ORAL
Qty: 90 TAB | Refills: 0 | Status: SHIPPED | OUTPATIENT
Start: 2019-03-20 | End: 2019-07-17 | Stop reason: SDUPTHER

## 2019-03-20 RX ORDER — TRIAMTERENE/HYDROCHLOROTHIAZID 37.5-25 MG
TABLET ORAL
Qty: 90 TAB | Refills: 0 | Status: SHIPPED | OUTPATIENT
Start: 2019-03-20 | End: 2019-07-14 | Stop reason: SDUPTHER

## 2019-03-20 NOTE — PROGRESS NOTES
Oneta Rater  Identified pt with two pt identifiers(name and ). Chief Complaint   Patient presents with   [facility] ED Follow-up     [facility]    Diabetes    Medication Refill       1. Have you been to the ER, urgent care clinic since your last visit? Hospitalized since your last visit? [facility]    2. Have you seen or consulted any other health care providers outside of the [address] [place] [address] since your last visit? Include any pap smears or colon screening. No      Would you like to sign up for MyChart today, if you have not already done so? Patient has a mychart  If not, would you like information on MyChart, and how to sign up at a later time? No      Medication reconciliation up to date and corrected with patient at this time. Today's provider has been notified of reason for visit, vitals and flowsheets obtained on patients. Reviewed record in preparation for visit, huddled with provider and have obtained necessary documentation.       Health Maintenance Due   Topic    Pneumococcal 0-64 years (1 of 1 - PPSV23)    Shingrix Vaccine Age 50> (1 of 2)    EYE EXAM RETINAL OR DILATED     MICROALBUMIN Q1     LIPID PANEL Q1     PAP AKA CERVICAL CYTOLOGY     HEMOGLOBIN A1C Q6M        Wt Readings from Last 3 Encounters:   19 157 lb 9.6 oz (71.5 kg)   18 153 lb 12.8 oz (69.8 kg)   18 157 lb 3.2 oz (71.3 kg)     Temp Readings from Last 3 Encounters:   19 97.2 °F (36.2 °C) (Oral)   18 97.4 °F (36.3 °C) (Oral)   18 98.7 °F (37.1 °C) (Oral)     BP Readings from Last 3 Encounters:   19 142/82   18 131/83   18 127/82     Pulse Readings from Last 3 Encounters:   19 70   18 74   18 78     Vitals:    19 0847   BP: 142/82   Pulse: 70   Resp: 20   Temp: 97.2 °F (36.2 °C)   TempSrc: Oral   SpO2: 100%   Weight: 157 lb 9.6 oz (71.5 kg)   Height: 5' 8\" (1.727 m)   PainSc:   0 - No pain   LMP: 2010         Learning Assessment:  :     Learning Assessment 6/18/2014   PRIMARY LEARNER Patient   HIGHEST LEVEL OF EDUCATION - PRIMARY LEARNER  GRADUATED HIGH SCHOOL OR GED   PRIMARY LANGUAGE ENGLISH   LEARNER PREFERENCE PRIMARY PICTURES     VIDEOS     READING   ANSWERED BY patient   RELATIONSHIP SELF       Depression Screening:  :     3 most recent PHQ Screens 3/20/2019   Little interest or pleasure in doing things Not at all   Feeling down, depressed, irritable, or hopeless Not at all   Total Score PHQ 2 0       Fall Risk Assessment:  :     Fall Risk Assessment, last 12 mths 3/20/2019   Able to walk? Yes   Fall in past 12 months? Yes   Fall with injury? No   Number of falls in past 12 months 1   Fall Risk Score 1       Abuse Screening:  :     Abuse Screening Questionnaire 3/20/2019 9/20/2018 10/16/2017   Do you ever feel afraid of your partner? N N N   Are you in a relationship with someone who physically or mentally threatens you? N N N   Is it safe for you to go home?  Y Y Y       ADL Screening:  :     ADL Assessment 3/20/2019   Feeding yourself No Help Needed   Getting from bed to chair No Help Needed   Getting dressed No Help Needed   Bathing or showering No Help Needed   Walk across the room (includes cane/walker) No Help Needed   Using the telphone No Help Needed   Taking your medications No Help Needed   Preparing meals No Help Needed   Managing money (expenses/bills) No Help Needed   Moderately strenuous housework (laundry) No Help Needed   Shopping for personal items (toiletries/medicines) No Help Needed   Shopping for groceries No Help Needed   Driving No Help Needed   Climbing a flight of stairs No Help Needed   Getting to places beyond walking distances No Help Needed

## 2019-03-20 NOTE — PROGRESS NOTES
Taking meds. Admits to cheating on diet. Still exercising. States will do better and plan recheck 3 mos. PF past Feb for cold and congestion, laryngitis for a month. Put on Amox. Helped some. Still hoarse. Throat raw. Occas ear pain. Ears clogged. Visit Vitals  /82 (BP 1 Location: Left arm, BP Patient Position: Sitting)   Pulse 70   Temp 97.2 °F (36.2 °C) (Oral)   Resp 20   Ht 5' 8\" (1.727 m)   Wt 157 lb 9.6 oz (71.5 kg)   LMP 05/31/2010   SpO2 100%   BMI 23.96 kg/m²       Patient alert and cooperative. Ears - canals/TMs clear. Mouth - posterior pharynx negative. Assessment:  1. Persistent hoarseness post remote URI. Plan:  1. Voice rest.  2. Try steroid nasal spray for a week to see if benefit. 3. Follow up if secondary infection. 4. To ENT if needed, given referral.  5. Follow otherwise here prn.  6. Return in three months for recheck of uncontrolled diabetes.

## 2019-03-21 LAB
25(OH)D3+25(OH)D2 SERPL-MCNC: 36.7 NG/ML (ref 30–100)
ALBUMIN SERPL-MCNC: 4.3 G/DL (ref 3.6–4.8)
ALBUMIN/CREAT UR: 13.7 MG/G CREAT (ref 0–30)
ALBUMIN/GLOB SERPL: 1.7 {RATIO} (ref 1.2–2.2)
ALP SERPL-CCNC: 68 IU/L (ref 39–117)
ALT SERPL-CCNC: 11 IU/L (ref 0–32)
APPEARANCE UR: CLEAR
AST SERPL-CCNC: 22 IU/L (ref 0–40)
BASOPHILS # BLD AUTO: 0 X10E3/UL (ref 0–0.2)
BASOPHILS NFR BLD AUTO: 1 %
BILIRUB SERPL-MCNC: 0.4 MG/DL (ref 0–1.2)
BILIRUB UR QL STRIP: NEGATIVE
BUN SERPL-MCNC: 17 MG/DL (ref 8–27)
BUN/CREAT SERPL: 21 (ref 12–28)
CALCIUM SERPL-MCNC: 9.3 MG/DL (ref 8.7–10.3)
CHLORIDE SERPL-SCNC: 99 MMOL/L (ref 96–106)
CHOLEST SERPL-MCNC: 173 MG/DL (ref 100–199)
CO2 SERPL-SCNC: 24 MMOL/L (ref 20–29)
COLOR UR: YELLOW
CREAT SERPL-MCNC: 0.81 MG/DL (ref 0.57–1)
CREAT UR-MCNC: 41.7 MG/DL
EOSINOPHIL # BLD AUTO: 0.1 X10E3/UL (ref 0–0.4)
EOSINOPHIL NFR BLD AUTO: 2 %
ERYTHROCYTE [DISTWIDTH] IN BLOOD BY AUTOMATED COUNT: 13.9 % (ref 12.3–15.4)
GLOBULIN SER CALC-MCNC: 2.6 G/DL (ref 1.5–4.5)
GLUCOSE SERPL-MCNC: 147 MG/DL (ref 65–99)
GLUCOSE UR QL: ABNORMAL
HCT VFR BLD AUTO: 36.9 % (ref 34–46.6)
HDLC SERPL-MCNC: 64 MG/DL
HGB BLD-MCNC: 11.9 G/DL (ref 11.1–15.9)
HGB UR QL STRIP: NEGATIVE
IMM GRANULOCYTES # BLD AUTO: 0 X10E3/UL (ref 0–0.1)
IMM GRANULOCYTES NFR BLD AUTO: 1 %
INTERPRETATION, 910389: NORMAL
KETONES UR QL STRIP: NEGATIVE
LDLC SERPL CALC-MCNC: 92 MG/DL (ref 0–99)
LEUKOCYTE ESTERASE UR QL STRIP: NEGATIVE
LYMPHOCYTES # BLD AUTO: 1.4 X10E3/UL (ref 0.7–3.1)
LYMPHOCYTES NFR BLD AUTO: 34 %
Lab: NORMAL
MCH RBC QN AUTO: 29.2 PG (ref 26.6–33)
MCHC RBC AUTO-ENTMCNC: 32.2 G/DL (ref 31.5–35.7)
MCV RBC AUTO: 91 FL (ref 79–97)
MICRO URNS: ABNORMAL
MICROALBUMIN UR-MCNC: 5.7 UG/ML
MONOCYTES # BLD AUTO: 0.3 X10E3/UL (ref 0.1–0.9)
MONOCYTES NFR BLD AUTO: 8 %
NEUTROPHILS # BLD AUTO: 2.3 X10E3/UL (ref 1.4–7)
NEUTROPHILS NFR BLD AUTO: 54 %
NITRITE UR QL STRIP: NEGATIVE
PH UR STRIP: 6 [PH] (ref 5–7.5)
PLATELET # BLD AUTO: 325 X10E3/UL (ref 150–379)
POTASSIUM SERPL-SCNC: 4.4 MMOL/L (ref 3.5–5.2)
PROT SERPL-MCNC: 6.9 G/DL (ref 6–8.5)
PROT UR QL STRIP: NEGATIVE
RBC # BLD AUTO: 4.07 X10E6/UL (ref 3.77–5.28)
SODIUM SERPL-SCNC: 138 MMOL/L (ref 134–144)
SP GR UR: 1.02 (ref 1–1.03)
TRIGL SERPL-MCNC: 83 MG/DL (ref 0–149)
TSH SERPL DL<=0.005 MIU/L-ACNC: 1.66 UIU/ML (ref 0.45–4.5)
UROBILINOGEN UR STRIP-MCNC: 0.2 MG/DL (ref 0.2–1)
VLDLC SERPL CALC-MCNC: 17 MG/DL (ref 5–40)
WBC # BLD AUTO: 4.2 X10E3/UL (ref 3.4–10.8)

## 2019-06-20 ENCOUNTER — OFFICE VISIT (OUTPATIENT)
Dept: FAMILY MEDICINE CLINIC | Age: 59
End: 2019-06-20

## 2019-06-20 VITALS
SYSTOLIC BLOOD PRESSURE: 138 MMHG | WEIGHT: 151.8 LBS | OXYGEN SATURATION: 98 % | DIASTOLIC BLOOD PRESSURE: 85 MMHG | HEART RATE: 76 BPM | TEMPERATURE: 98.6 F | HEIGHT: 68 IN | RESPIRATION RATE: 19 BRPM | BODY MASS INDEX: 23.01 KG/M2

## 2019-06-20 DIAGNOSIS — E11.65 UNCONTROLLED TYPE 2 DIABETES MELLITUS WITH HYPERGLYCEMIA, WITHOUT LONG-TERM CURRENT USE OF INSULIN (HCC): ICD-10-CM

## 2019-06-20 DIAGNOSIS — E11.65 UNCONTROLLED TYPE 2 DIABETES MELLITUS WITH HYPERGLYCEMIA (HCC): Primary | ICD-10-CM

## 2019-06-20 LAB — HBA1C MFR BLD HPLC: 8.2 %

## 2019-06-20 RX ORDER — AMOXICILLIN 500 MG/1
CAPSULE ORAL
COMMUNITY
Start: 2019-06-10 | End: 2019-10-01 | Stop reason: ALTCHOICE

## 2019-06-20 RX ORDER — RANITIDINE 300 MG/1
TABLET ORAL
Refills: 3 | COMMUNITY
Start: 2019-05-29 | End: 2021-03-19

## 2019-06-20 NOTE — PROGRESS NOTES
Yao North  Identified pt with two pt identifiers(name and ). Chief Complaint   Patient presents with    Follow-up     3 month/ room 2    Diabetes     1. Have you been to the ER, urgent care clinic since your last visit? Y [facility]   Hospitalized since your last visit? n   2. Have you seen or consulted any other health care providers outside of the [address] [place] [address] since your last visit? Include any pap smears or colon screening. n       Advance Care Planning    In the event something were to happen to you and you were unable to speak on your behalf, do you have an Advance Directive/ Living Will in place stating your wishes? NO    If yes, do we have a copy on file NO    If no, would you like information NO    Medication reconciliation up to date and corrected with patient at this time. Today's provider has been notified of reason for visit, vitals and flowsheets obtained on patients. Reviewed record in preparation for visit, huddled with provider and have obtained necessary documentation.       Health Maintenance Due   Topic    Pneumococcal 0-64 years (1 of 1 - PPSV23)    Shingrix Vaccine Age 50> (1 of 2)    EYE EXAM RETINAL OR DILATED        Wt Readings from Last 3 Encounters:   19 151 lb 12.8 oz (68.9 kg)   19 157 lb 9.6 oz (71.5 kg)   18 153 lb 12.8 oz (69.8 kg)     Temp Readings from Last 3 Encounters:   19 98.6 °F (37 °C) (Oral)   19 97.2 °F (36.2 °C) (Oral)   18 97.4 °F (36.3 °C) (Oral)     BP Readings from Last 3 Encounters:   19 138/85   19 142/82   18 131/83     Pulse Readings from Last 3 Encounters:   19 76   19 70   18 74     Vitals:    19 0905   BP: 138/85   Pulse: 76   Resp: 19   Temp: 98.6 °F (37 °C)   TempSrc: Oral   SpO2: 98%   Weight: 151 lb 12.8 oz (68.9 kg)   Height: 5' 8\" (1.727 m)   PainSc:   0 - No pain   LMP: 2010         Learning Assessment:  :     Learning Assessment 6/18/2014   PRIMARY LEARNER Patient   HIGHEST LEVEL OF EDUCATION - PRIMARY LEARNER  GRADUATED HIGH SCHOOL OR GED   PRIMARY LANGUAGE ENGLISH   LEARNER PREFERENCE PRIMARY PICTURES     VIDEOS     READING   ANSWERED BY patient   RELATIONSHIP SELF       Depression Screening:  :     3 most recent PHQ Screens 6/20/2019   Little interest or pleasure in doing things Not at all   Feeling down, depressed, irritable, or hopeless Not at all   Total Score PHQ 2 0       No flowsheet data found. Fall Risk Assessment:  :     Fall Risk Assessment, last 12 mths 3/20/2019   Able to walk? Yes   Fall in past 12 months? Yes   Fall with injury? No   Number of falls in past 12 months 1   Fall Risk Score 1       Abuse Screening:  :     Abuse Screening Questionnaire 3/20/2019 9/20/2018 10/16/2017   Do you ever feel afraid of your partner? N N N   Are you in a relationship with someone who physically or mentally threatens you? N N N   Is it safe for you to go home?  Y Y Y       ADL Screening:  :     ADL Assessment 3/20/2019   Feeding yourself No Help Needed   Getting from bed to chair No Help Needed   Getting dressed No Help Needed   Bathing or showering No Help Needed   Walk across the room (includes cane/walker) No Help Needed   Using the telphone No Help Needed   Taking your medications No Help Needed   Preparing meals No Help Needed   Managing money (expenses/bills) No Help Needed   Moderately strenuous housework (laundry) No Help Needed   Shopping for personal items (toiletries/medicines) No Help Needed   Shopping for groceries No Help Needed   Driving No Help Needed   Climbing a flight of stairs No Help Needed   Getting to places beyond walking distances No Help Needed           BMI:  Weight Metrics 6/20/2019 3/20/2019 9/20/2018 1/29/2018 10/16/2017 3/13/2017 11/21/2016   Weight 151 lb 12.8 oz 157 lb 9.6 oz 153 lb 12.8 oz 157 lb 3.2 oz 151 lb 9.6 oz 161 lb 12.8 oz 162 lb   BMI 23.08 kg/m2 23.96 kg/m2 23.39 kg/m2 23.9 kg/m2 23.05 kg/m2 24.6 kg/m2 24.63 kg/m2           Medication reconciliation up to date and corrected with patient at this time.

## 2019-06-20 NOTE — PROGRESS NOTES
Here for [facility] DM f/u. A1c worse today. Not exercising. Job not active. Same diet. On Amox for Strep. Saw ENT b/o throat. Thyroid O.K. Put Zantac BID for reflux. Patient denies chest pain, dyspnea, unexpected weight change, unexpected pain, mood or memory changes. Visit Vitals  /85 (BP 1 Location: Left arm, BP Patient Position: Sitting)   Pulse 76   Temp 98.6 °F (37 °C) (Oral)   Resp 19   Ht 5' 8\" (1.727 m)   Wt 151 lb 12.8 oz (68.9 kg)   LMP 05/31/2010   SpO2 98%   BMI 23.08 kg/m²     Patient alert and cooperative. Reviewed above. Assessment:  1. Uncontrolled diabetes, worse. Plan:  1. Will increase current Jardiance from 10 to 25 mg.  2. Recheck in three months. 3. Try to get some exercise with walking. 4. Follow otherwise here prn.

## 2019-07-14 DIAGNOSIS — I10 ESSENTIAL HYPERTENSION: ICD-10-CM

## 2019-07-15 RX ORDER — TRIAMTERENE/HYDROCHLOROTHIAZID 37.5-25 MG
TABLET ORAL
Qty: 90 TAB | Refills: 2 | Status: SHIPPED | OUTPATIENT
Start: 2019-07-15 | End: 2020-04-15 | Stop reason: SDUPTHER

## 2019-07-17 DIAGNOSIS — I10 ESSENTIAL HYPERTENSION: ICD-10-CM

## 2019-07-17 DIAGNOSIS — E11.65 UNCONTROLLED TYPE 2 DIABETES MELLITUS WITH HYPERGLYCEMIA, WITHOUT LONG-TERM CURRENT USE OF INSULIN (HCC): ICD-10-CM

## 2019-07-17 RX ORDER — METFORMIN HYDROCHLORIDE 500 MG/1
TABLET ORAL
Qty: 360 TAB | Refills: 0 | Status: SHIPPED | OUTPATIENT
Start: 2019-07-17 | End: 2019-08-16 | Stop reason: SDUPTHER

## 2019-07-17 RX ORDER — ENALAPRIL MALEATE 10 MG/1
TABLET ORAL
Qty: 90 TAB | Refills: 0 | Status: SHIPPED | OUTPATIENT
Start: 2019-07-17 | End: 2019-08-10 | Stop reason: SDUPTHER

## 2019-08-10 DIAGNOSIS — E11.65 UNCONTROLLED TYPE 2 DIABETES MELLITUS WITH HYPERGLYCEMIA, WITHOUT LONG-TERM CURRENT USE OF INSULIN (HCC): ICD-10-CM

## 2019-08-10 DIAGNOSIS — I10 ESSENTIAL HYPERTENSION: ICD-10-CM

## 2019-08-12 RX ORDER — ENALAPRIL MALEATE 10 MG/1
TABLET ORAL
Qty: 90 TAB | Refills: 0 | Status: SHIPPED | OUTPATIENT
Start: 2019-08-12 | End: 2019-09-18 | Stop reason: SDUPTHER

## 2019-08-12 NOTE — TELEPHONE ENCOUNTER
PCP: Pedro Cline MD    Last appt: 6/20/2019  Future Appointments   Date Time Provider Zack Davis   9/19/2019  8:45 AM Matt Moreno MD [facility]       Requested Prescriptions     Pending Prescriptions Disp Refills    enalapril (VASOTEC) 10 mg tablet [Pharmacy Med Name: ENALAPRIL MALEATE 10 MG TAB] 90 Tab 0     Sig: TAKE 1 TABLET BY MOUTH EVERY DAY       Prior labs and Blood pressures:  BP Readings from Last 3 Encounters:   06/20/19 138/85   03/20/19 142/82   09/20/18 131/83     Lab Results   Component Value Date/Time    Sodium 138 03/20/2019 09:46 AM    Potassium 4.4 03/20/2019 09:46 AM    Chloride 99 03/20/2019 09:46 AM    CO2 24 03/20/2019 09:46 AM    Anion gap 7 06/14/2010 09:48 AM    Glucose 147 (H) 03/20/2019 09:46 AM    BUN 17 03/20/2019 09:46 AM    Creatinine 0.81 03/20/2019 09:46 AM    BUN/Creatinine ratio 21 03/20/2019 09:46 AM    GFR est AA 91 03/20/2019 09:46 AM    GFR est non-AA 79 03/20/2019 09:46 AM    Calcium 9.3 03/20/2019 09:46 AM     Lab Results   Component Value Date/Time    Hemoglobin A1c 8.2 (H) 05/13/2015 08:17 AM    Hemoglobin A1c (POC) 8.2 06/20/2019 09:00 AM     Lab Results   Component Value Date/Time    Cholesterol, total 173 03/20/2019 09:46 AM    HDL Cholesterol 64 03/20/2019 09:46 AM    LDL, calculated 92 03/20/2019 09:46 AM    VLDL, calculated 17 03/20/2019 09:46 AM    Triglyceride 83 03/20/2019 09:46 AM    CHOL/HDL Ratio 2.4 06/14/2010 09:48 AM     Lab Results   Component Value Date/Time    Vitamin D 25-Hydroxy 35.2 08/08/2011 09:21 AM    VITAMIN D, 25-HYDROXY 36.7 03/20/2019 09:46 AM       Lab Results   Component Value Date/Time    TSH 1.660 03/20/2019 09:46 AM

## 2019-08-16 DIAGNOSIS — E11.65 UNCONTROLLED TYPE 2 DIABETES MELLITUS WITH HYPERGLYCEMIA, WITHOUT LONG-TERM CURRENT USE OF INSULIN (HCC): ICD-10-CM

## 2019-08-19 NOTE — TELEPHONE ENCOUNTER
PCP: Fern Bernabe MD    Last appt: 6/20/2019  Future Appointments   Date Time Provider Zack Davis   9/19/2019  8:45 AM Yaron Moreno MD [facility]       Requested Prescriptions     Pending Prescriptions Disp Refills    metFORMIN (GLUCOPHAGE) 500 mg tablet [Pharmacy Med Name: METFORMIN  MG TABLET] 360 Tab 0     Sig: TAKE 2 TABLETS BY MOUTH TWICE A DAY WITH MEALS       Prior labs and Blood pressures:  BP Readings from Last 3 Encounters:   06/20/19 138/85   03/20/19 142/82   09/20/18 131/83     Lab Results   Component Value Date/Time    Sodium 138 03/20/2019 09:46 AM    Potassium 4.4 03/20/2019 09:46 AM    Chloride 99 03/20/2019 09:46 AM    CO2 24 03/20/2019 09:46 AM    Anion gap 7 06/14/2010 09:48 AM    Glucose 147 (H) 03/20/2019 09:46 AM    BUN 17 03/20/2019 09:46 AM    Creatinine 0.81 03/20/2019 09:46 AM    BUN/Creatinine ratio 21 03/20/2019 09:46 AM    GFR est AA 91 03/20/2019 09:46 AM    GFR est non-AA 79 03/20/2019 09:46 AM    Calcium 9.3 03/20/2019 09:46 AM     Lab Results   Component Value Date/Time    Hemoglobin A1c 8.2 (H) 05/13/2015 08:17 AM    Hemoglobin A1c (POC) 8.2 06/20/2019 09:00 AM     Lab Results   Component Value Date/Time    Cholesterol, total 173 03/20/2019 09:46 AM    HDL Cholesterol 64 03/20/2019 09:46 AM    LDL, calculated 92 03/20/2019 09:46 AM    VLDL, calculated 17 03/20/2019 09:46 AM    Triglyceride 83 03/20/2019 09:46 AM    CHOL/HDL Ratio 2.4 06/14/2010 09:48 AM     Lab Results   Component Value Date/Time    Vitamin D 25-Hydroxy 35.2 08/08/2011 09:21 AM    VITAMIN D, 25-HYDROXY 36.7 03/20/2019 09:46 AM       Lab Results   Component Value Date/Time    TSH 1.660 03/20/2019 09:46 AM

## 2019-08-20 RX ORDER — METFORMIN HYDROCHLORIDE 500 MG/1
TABLET ORAL
Qty: 120 TAB | Refills: 0 | Status: SHIPPED | OUTPATIENT
Start: 2019-08-20 | End: 2019-09-13 | Stop reason: SDUPTHER

## 2019-09-13 DIAGNOSIS — E11.65 UNCONTROLLED TYPE 2 DIABETES MELLITUS WITH HYPERGLYCEMIA (HCC): ICD-10-CM

## 2019-09-13 DIAGNOSIS — E11.65 UNCONTROLLED TYPE 2 DIABETES MELLITUS WITH HYPERGLYCEMIA, WITHOUT LONG-TERM CURRENT USE OF INSULIN (HCC): ICD-10-CM

## 2019-09-13 NOTE — TELEPHONE ENCOUNTER
PCP: Mary Casey MD    Last appt: 6/20/2019  Future Appointments   Date Time Provider Zack Davis   9/19/2019  8:45 AM Uzma Moreno MD [facility]       Requested Prescriptions     Pending Prescriptions Disp Refills    empagliflozin (JARDIANCE) 25 mg tablet [Pharmacy Med Name: Jimbo Fregoso 25 MG TABLET] 90 Tab 0     Sig: TAKE 1 TABLET BY MOUTH EVERY DAY       Prior labs and Blood pressures:  BP Readings from Last 3 Encounters:   06/20/19 138/85   03/20/19 142/82   09/20/18 131/83     Lab Results   Component Value Date/Time    Sodium 138 03/20/2019 09:46 AM    Potassium 4.4 03/20/2019 09:46 AM    Chloride 99 03/20/2019 09:46 AM    CO2 24 03/20/2019 09:46 AM    Anion gap 7 06/14/2010 09:48 AM    Glucose 147 (H) 03/20/2019 09:46 AM    BUN 17 03/20/2019 09:46 AM    Creatinine 0.81 03/20/2019 09:46 AM    BUN/Creatinine ratio 21 03/20/2019 09:46 AM    GFR est AA 91 03/20/2019 09:46 AM    GFR est non-AA 79 03/20/2019 09:46 AM    Calcium 9.3 03/20/2019 09:46 AM     Lab Results   Component Value Date/Time    Hemoglobin A1c 8.2 (H) 05/13/2015 08:17 AM    Hemoglobin A1c (POC) 8.2 06/20/2019 09:00 AM     Lab Results   Component Value Date/Time    Cholesterol, total 173 03/20/2019 09:46 AM    HDL Cholesterol 64 03/20/2019 09:46 AM    LDL, calculated 92 03/20/2019 09:46 AM    VLDL, calculated 17 03/20/2019 09:46 AM    Triglyceride 83 03/20/2019 09:46 AM    CHOL/HDL Ratio 2.4 06/14/2010 09:48 AM     Lab Results   Component Value Date/Time    Vitamin D 25-Hydroxy 35.2 08/08/2011 09:21 AM    VITAMIN D, 25-HYDROXY 36.7 03/20/2019 09:46 AM       Lab Results   Component Value Date/Time    TSH 1.660 03/20/2019 09:46 AM

## 2019-09-13 NOTE — TELEPHONE ENCOUNTER
PCP: Khadra Fernandez MD    Last appt: 6/20/2019  Future Appointments   Date Time Provider Zack Davis   9/19/2019  8:45 AM Rashad Moreno MD [facility]       Requested Prescriptions     Pending Prescriptions Disp Refills    metFORMIN (GLUCOPHAGE) 500 mg tablet [Pharmacy Med Name: METFORMIN  MG TABLET] 120 Tab 0     Sig: TAKE 2 TABLETS BY MOUTH TWICE A DAY WITH MEALS.  DUE FOR FOLLOW UP NEXT MONTH       Prior labs and Blood pressures:  BP Readings from Last 3 Encounters:   06/20/19 138/85   03/20/19 142/82   09/20/18 131/83     Lab Results   Component Value Date/Time    Sodium 138 03/20/2019 09:46 AM    Potassium 4.4 03/20/2019 09:46 AM    Chloride 99 03/20/2019 09:46 AM    CO2 24 03/20/2019 09:46 AM    Anion gap 7 06/14/2010 09:48 AM    Glucose 147 (H) 03/20/2019 09:46 AM    BUN 17 03/20/2019 09:46 AM    Creatinine 0.81 03/20/2019 09:46 AM    BUN/Creatinine ratio 21 03/20/2019 09:46 AM    GFR est AA 91 03/20/2019 09:46 AM    GFR est non-AA 79 03/20/2019 09:46 AM    Calcium 9.3 03/20/2019 09:46 AM     Lab Results   Component Value Date/Time    Hemoglobin A1c 8.2 (H) 05/13/2015 08:17 AM    Hemoglobin A1c (POC) 8.2 06/20/2019 09:00 AM     Lab Results   Component Value Date/Time    Cholesterol, total 173 03/20/2019 09:46 AM    HDL Cholesterol 64 03/20/2019 09:46 AM    LDL, calculated 92 03/20/2019 09:46 AM    VLDL, calculated 17 03/20/2019 09:46 AM    Triglyceride 83 03/20/2019 09:46 AM    CHOL/HDL Ratio 2.4 06/14/2010 09:48 AM     Lab Results   Component Value Date/Time    Vitamin D 25-Hydroxy 35.2 08/08/2011 09:21 AM    VITAMIN D, 25-HYDROXY 36.7 03/20/2019 09:46 AM       Lab Results   Component Value Date/Time    TSH 1.660 03/20/2019 09:46 AM

## 2019-09-16 RX ORDER — METFORMIN HYDROCHLORIDE 500 MG/1
TABLET ORAL
Qty: 120 TAB | Refills: 0 | Status: SHIPPED | OUTPATIENT
Start: 2019-09-16 | End: 2019-09-18 | Stop reason: SDUPTHER

## 2019-09-18 DIAGNOSIS — I10 ESSENTIAL HYPERTENSION: ICD-10-CM

## 2019-09-18 DIAGNOSIS — E11.65 UNCONTROLLED TYPE 2 DIABETES MELLITUS WITH HYPERGLYCEMIA, WITHOUT LONG-TERM CURRENT USE OF INSULIN (HCC): ICD-10-CM

## 2019-09-18 DIAGNOSIS — E11.65 UNCONTROLLED TYPE 2 DIABETES MELLITUS WITH HYPERGLYCEMIA (HCC): ICD-10-CM

## 2019-09-18 RX ORDER — METFORMIN HYDROCHLORIDE 500 MG/1
TABLET ORAL
Qty: 120 TAB | Refills: 0 | Status: SHIPPED | OUTPATIENT
Start: 2019-09-18 | End: 2019-10-01 | Stop reason: SDUPTHER

## 2019-09-18 RX ORDER — ENALAPRIL MALEATE 10 MG/1
TABLET ORAL
Qty: 90 TAB | Refills: 1 | Status: SHIPPED | OUTPATIENT
Start: 2019-09-18 | End: 2020-04-14 | Stop reason: SDUPTHER

## 2019-09-18 RX ORDER — GLIMEPIRIDE 2 MG/1
TABLET ORAL
Qty: 30 TAB | Refills: 0 | Status: SHIPPED | OUTPATIENT
Start: 2019-09-18 | End: 2019-10-01 | Stop reason: SDUPTHER

## 2019-09-18 NOTE — TELEPHONE ENCOUNTER
Requested Prescriptions     Pending Prescriptions Disp Refills    metFORMIN (GLUCOPHAGE) 500 mg tablet 120 Tab 0     Sig: TAKE 2 TABLETS BY MOUTH TWICE A DAY WITH MEALS. DUE FOR FOLLOW UP NEXT MONTH    glimepiride (AMARYL) 2 mg tablet 90 Tab 0    enalapril (VASOTEC) 10 mg tablet 90 Tab 0    empagliflozin (JARDIANCE) 25 mg tablet 30 Tab 0     Sig: TAKE 1 TABLET BY MOUTH EVERY DAY     Patient thought her appointment was today, unable to keep tmrw appointment due to her work schedule. Patient have reschedule appointment for Oct 1.

## 2019-09-18 NOTE — TELEPHONE ENCOUNTER
PCP: Marlin Hebert MD    Last appt: 6/20/2019  Future Appointments   Date Time Provider Zack Davis   10/1/2019  9:45 AM Kaitlyn Moreno MD [facility]       Requested Prescriptions     Pending Prescriptions Disp Refills    metFORMIN (GLUCOPHAGE) 500 mg tablet 120 Tab 0     Sig: TAKE 2 TABLETS BY MOUTH TWICE A DAY WITH MEALS.  DUE FOR FOLLOW UP NEXT MONTH    glimepiride (AMARYL) 2 mg tablet 90 Tab 0    enalapril (VASOTEC) 10 mg tablet 90 Tab 0    empagliflozin (JARDIANCE) 25 mg tablet 30 Tab 0     Sig: TAKE 1 TABLET BY MOUTH EVERY DAY       Prior labs and Blood pressures:  BP Readings from Last 3 Encounters:   06/20/19 138/85   03/20/19 142/82   09/20/18 131/83     Lab Results   Component Value Date/Time    Sodium 138 03/20/2019 09:46 AM    Potassium 4.4 03/20/2019 09:46 AM    Chloride 99 03/20/2019 09:46 AM    CO2 24 03/20/2019 09:46 AM    Anion gap 7 06/14/2010 09:48 AM    Glucose 147 (H) 03/20/2019 09:46 AM    BUN 17 03/20/2019 09:46 AM    Creatinine 0.81 03/20/2019 09:46 AM    BUN/Creatinine ratio 21 03/20/2019 09:46 AM    GFR est AA 91 03/20/2019 09:46 AM    GFR est non-AA 79 03/20/2019 09:46 AM    Calcium 9.3 03/20/2019 09:46 AM     Lab Results   Component Value Date/Time    Hemoglobin A1c 8.2 (H) 05/13/2015 08:17 AM    Hemoglobin A1c (POC) 8.2 06/20/2019 09:00 AM     Lab Results   Component Value Date/Time    Cholesterol, total 173 03/20/2019 09:46 AM    HDL Cholesterol 64 03/20/2019 09:46 AM    LDL, calculated 92 03/20/2019 09:46 AM    VLDL, calculated 17 03/20/2019 09:46 AM    Triglyceride 83 03/20/2019 09:46 AM    CHOL/HDL Ratio 2.4 06/14/2010 09:48 AM     Lab Results   Component Value Date/Time    Vitamin D 25-Hydroxy 35.2 08/08/2011 09:21 AM    VITAMIN D, 25-HYDROXY 36.7 03/20/2019 09:46 AM       Lab Results   Component Value Date/Time    TSH 1.660 03/20/2019 09:46 AM

## 2019-10-01 ENCOUNTER — OFFICE VISIT (OUTPATIENT)
Dept: FAMILY MEDICINE CLINIC | Age: 59
End: 2019-10-01

## 2019-10-01 VITALS
OXYGEN SATURATION: 100 % | SYSTOLIC BLOOD PRESSURE: 123 MMHG | RESPIRATION RATE: 18 BRPM | HEIGHT: 67 IN | DIASTOLIC BLOOD PRESSURE: 73 MMHG | TEMPERATURE: 97.5 F | WEIGHT: 151.7 LBS | HEART RATE: 74 BPM | BODY MASS INDEX: 23.81 KG/M2

## 2019-10-01 DIAGNOSIS — G47.00 INSOMNIA, UNSPECIFIED TYPE: ICD-10-CM

## 2019-10-01 DIAGNOSIS — E11.65 UNCONTROLLED TYPE 2 DIABETES MELLITUS WITH HYPERGLYCEMIA (HCC): ICD-10-CM

## 2019-10-01 DIAGNOSIS — E11.65 UNCONTROLLED TYPE 2 DIABETES MELLITUS WITH HYPERGLYCEMIA, WITHOUT LONG-TERM CURRENT USE OF INSULIN (HCC): Primary | ICD-10-CM

## 2019-10-01 DIAGNOSIS — M25.512 ACUTE PAIN OF LEFT SHOULDER: ICD-10-CM

## 2019-10-01 DIAGNOSIS — Z56.6 WORK-RELATED STRESS: ICD-10-CM

## 2019-10-01 LAB — HBA1C MFR BLD HPLC: 7.7 %

## 2019-10-01 RX ORDER — TRAZODONE HYDROCHLORIDE 50 MG/1
50 TABLET ORAL
Qty: 30 TAB | Refills: 0 | Status: SHIPPED | OUTPATIENT
Start: 2019-10-01 | End: 2019-10-29 | Stop reason: SDUPTHER

## 2019-10-01 RX ORDER — GLIMEPIRIDE 2 MG/1
TABLET ORAL
Qty: 90 TAB | Refills: 0 | Status: SHIPPED | OUTPATIENT
Start: 2019-10-01 | End: 2019-11-07 | Stop reason: SDUPTHER

## 2019-10-01 RX ORDER — METFORMIN HYDROCHLORIDE 500 MG/1
TABLET ORAL
Qty: 120 TAB | Refills: 0 | Status: SHIPPED | OUTPATIENT
Start: 2019-10-01 | End: 2019-10-21 | Stop reason: SDUPTHER

## 2019-10-01 SDOH — HEALTH STABILITY - MENTAL HEALTH: OTHER PHYSICAL AND MENTAL STRAIN RELATED TO WORK: Z56.6

## 2019-10-01 NOTE — PROGRESS NOTES
Jenna May  Identified pt with two pt identifiers(name and ). Chief Complaint   Patient presents with    Diabetes    Shoulder Pain     left; started about a month ago       1. Have you been to the ER, urgent care clinic since your last visit? Hospitalized since your last visit? No    2. Have you seen or consulted any other health care providers outside of the [address] [place] [address] since your last visit? Include any pap smears or colon screening. No      Would you like to sign up for MyChart today, if you have not already done so? Patient has a mychart  If not, would you like information on MyChart, and how to sign up at a later time? No      Medication reconciliation up to date and corrected with patient at this time. Today's provider has been notified of reason for visit, vitals and flowsheets obtained on patients. Reviewed record in preparation for visit, huddled with provider and have obtained necessary documentation.       Health Maintenance Due   Topic    Shingrix Vaccine Age 50> (1 of 2)    FOOT EXAM Q1     COLONOSCOPY        Wt Readings from Last 3 Encounters:   10/01/19 151 lb 11.2 oz (68.8 kg)   19 151 lb 12.8 oz (68.9 kg)   19 157 lb 9.6 oz (71.5 kg)     Temp Readings from Last 3 Encounters:   10/01/19 97.5 °F (36.4 °C) (Oral)   19 98.6 °F (37 °C) (Oral)   19 97.2 °F (36.2 °C) (Oral)     BP Readings from Last 3 Encounters:   10/01/19 123/73   19 138/85   19 142/82     Pulse Readings from Last 3 Encounters:   10/01/19 74   19 76   19 70     Vitals:    10/01/19 1007   BP: 123/73   Pulse: 74   Resp: 18   Temp: 97.5 °F (36.4 °C)   TempSrc: Oral   SpO2: 100%   Weight: 151 lb 11.2 oz (68.8 kg)   Height: 5' 7\" (1.702 m)   PainSc:   9   PainLoc: Shoulder   LMP: 2010         Learning Assessment:  :     Learning Assessment 2014   PRIMARY LEARNER Patient   HIGHEST LEVEL OF EDUCATION - PRIMARY LEARNER  GRADUATED HIGH SCHOOL OR GED PRIMARY LANGUAGE ENGLISH   LEARNER PREFERENCE PRIMARY PICTURES     VIDEOS     READING   ANSWERED BY patient   RELATIONSHIP SELF       Depression Screening:  :     3 most recent PHQ Screens 6/20/2019   Little interest or pleasure in doing things Not at all   Feeling down, depressed, irritable, or hopeless Not at all   Total Score PHQ 2 0       Fall Risk Assessment:  :     Fall Risk Assessment, last 12 mths 3/20/2019   Able to walk? Yes   Fall in past 12 months? Yes   Fall with injury? No   Number of falls in past 12 months 1   Fall Risk Score 1       Abuse Screening:  :     Abuse Screening Questionnaire 3/20/2019 9/20/2018 10/16/2017   Do you ever feel afraid of your partner? N N N   Are you in a relationship with someone who physically or mentally threatens you? N N N   Is it safe for you to go home?  Y Y Y       ADL Screening:  :     ADL Assessment 3/20/2019   Feeding yourself No Help Needed   Getting from bed to chair No Help Needed   Getting dressed No Help Needed   Bathing or showering No Help Needed   Walk across the room (includes cane/walker) No Help Needed   Using the telphone No Help Needed   Taking your medications No Help Needed   Preparing meals No Help Needed   Managing money (expenses/bills) No Help Needed   Moderately strenuous housework (laundry) No Help Needed   Shopping for personal items (toiletries/medicines) No Help Needed   Shopping for groceries No Help Needed   Driving No Help Needed   Climbing a flight of stairs No Help Needed   Getting to places beyond walking distances No Help Needed
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
## 2019-10-21 DIAGNOSIS — E11.65 UNCONTROLLED TYPE 2 DIABETES MELLITUS WITH HYPERGLYCEMIA, WITHOUT LONG-TERM CURRENT USE OF INSULIN (HCC): ICD-10-CM

## 2019-10-21 DIAGNOSIS — E11.65 UNCONTROLLED TYPE 2 DIABETES MELLITUS WITH HYPERGLYCEMIA (HCC): ICD-10-CM

## 2019-10-21 RX ORDER — METFORMIN HYDROCHLORIDE 500 MG/1
TABLET ORAL
Qty: 120 TAB | Refills: 0 | Status: SHIPPED | OUTPATIENT
Start: 2019-10-21 | End: 2020-01-13

## 2019-10-21 NOTE — TELEPHONE ENCOUNTER
Requested Prescriptions     Pending Prescriptions Disp Refills    metFORMIN (GLUCOPHAGE) 500 mg tablet 120 Tab 0     Sig: TAKE 2 TABLETS BY MOUTH TWICE A DAY WITH MEALS.  DUE FOR FOLLOW UP [facility] DM NOW

## 2019-10-21 NOTE — TELEPHONE ENCOUNTER
----- Message from Gil Nelson sent at 10/21/2019 11:20 AM EDT -----  Regarding: Dr. Bojorquez Gent: [phone]  Pt requesting a return call regarding Rx Metformin 500 mg. Pt is completely out of medication. Please resend as a 90 day supply in order for insurance to cover cost. [facility] on file.

## 2019-10-21 NOTE — TELEPHONE ENCOUNTER
PCP: Chelsie Azul MD    Last appt: 10/1/2019  Future Appointments   Date Time Provider Zack Davis   1/13/2020  8:15 AM Khoa Moreno MD [facility]       Requested Prescriptions     Pending Prescriptions Disp Refills    metFORMIN (GLUCOPHAGE) 500 mg tablet 120 Tab 0     Sig: TAKE 2 TABLETS BY MOUTH TWICE A DAY WITH MEALS.  DUE FOR FOLLOW UP [facility] NOW       Prior labs and Blood pressures:  BP Readings from Last 3 Encounters:   10/01/19 123/73   06/20/19 138/85   03/20/19 142/82     Lab Results   Component Value Date/Time    Sodium 138 03/20/2019 09:46 AM    Potassium 4.4 03/20/2019 09:46 AM    Chloride 99 03/20/2019 09:46 AM    CO2 24 03/20/2019 09:46 AM    Anion gap 7 06/14/2010 09:48 AM    Glucose 147 (H) 03/20/2019 09:46 AM    BUN 17 03/20/2019 09:46 AM    Creatinine 0.81 03/20/2019 09:46 AM    BUN/Creatinine ratio 21 03/20/2019 09:46 AM    GFR est AA 91 03/20/2019 09:46 AM    GFR est non-AA 79 03/20/2019 09:46 AM    Calcium 9.3 03/20/2019 09:46 AM     Lab Results   Component Value Date/Time    Hemoglobin A1c 8.2 (H) 05/13/2015 08:17 AM    Hemoglobin A1c (POC) 7.7 10/01/2019 10:27 AM     Lab Results   Component Value Date/Time    Cholesterol, total 173 03/20/2019 09:46 AM    HDL Cholesterol 64 03/20/2019 09:46 AM    LDL, calculated 92 03/20/2019 09:46 AM    VLDL, calculated 17 03/20/2019 09:46 AM    Triglyceride 83 03/20/2019 09:46 AM    CHOL/HDL Ratio 2.4 06/14/2010 09:48 AM     Lab Results   Component Value Date/Time    Vitamin D 25-Hydroxy 35.2 08/08/2011 09:21 AM    VITAMIN D, 25-HYDROXY 36.7 03/20/2019 09:46 AM       Lab Results   Component Value Date/Time    TSH 1.660 03/20/2019 09:46 AM

## 2019-10-24 ENCOUNTER — TELEPHONE (OUTPATIENT)
Dept: FAMILY MEDICINE CLINIC | Age: 59
End: 2019-10-24

## 2019-10-24 NOTE — TELEPHONE ENCOUNTER
Writer called patient regarding Metformin refill. Dr. Porfirio Salgado is out of the office on vacation, and on-call provider sent in 30 day supply. Patient did not answer. Writer left  requesting phone call back.

## 2019-10-24 NOTE — TELEPHONE ENCOUNTER
----- Message from Juan Luis Garcia sent at 10/23/2019 10:20 AM EDT -----  Regarding: Dr. Kelli Marr (if not patient):      Relationship of caller (if not patient):      Best contact number(s):[phone]      Name of medication and dosage if known:      Is patient out of this medication (yes/no):   yes     Metformin  500mg 2 pills 2x a day  90 day supply   30 day supply was sent in    Pharmacy name:  [facility] listed in chart? (yes/no):  yes  Pharmacy phone [phone]      Details to clarify the request: been trying to get for three days now      Juan Luis Garcia

## 2019-10-29 DIAGNOSIS — Z56.6 WORK-RELATED STRESS: ICD-10-CM

## 2019-10-29 DIAGNOSIS — G47.00 INSOMNIA, UNSPECIFIED TYPE: ICD-10-CM

## 2019-10-29 RX ORDER — TRAZODONE HYDROCHLORIDE 50 MG/1
TABLET ORAL
Qty: 90 TAB | Refills: 1 | Status: SHIPPED | OUTPATIENT
Start: 2019-10-29 | End: 2020-01-27

## 2019-10-29 SDOH — HEALTH STABILITY - MENTAL HEALTH: OTHER PHYSICAL AND MENTAL STRAIN RELATED TO WORK: Z56.6

## 2019-10-29 NOTE — TELEPHONE ENCOUNTER
PCP: Deja Pereira MD    Last appt: 10/1/2019  Future Appointments   Date Time Provider Zack Davis   1/13/2020  8:15 AM Saranya Moreno MD [facility]       Requested Prescriptions     Pending Prescriptions Disp Refills    traZODone (DESYREL) 50 mg tablet [Pharmacy Med Name: TRAZODONE 50 MG TABLET] 30 Tab 0     Sig: TAKE 1 TABLET BY MOUTH EVERY DAY AT NIGHT       Prior labs and Blood pressures:  BP Readings from Last 3 Encounters:   10/01/19 123/73   06/20/19 138/85   03/20/19 142/82     Lab Results   Component Value Date/Time    Sodium 138 03/20/2019 09:46 AM    Potassium 4.4 03/20/2019 09:46 AM    Chloride 99 03/20/2019 09:46 AM    CO2 24 03/20/2019 09:46 AM    Anion gap 7 06/14/2010 09:48 AM    Glucose 147 (H) 03/20/2019 09:46 AM    BUN 17 03/20/2019 09:46 AM    Creatinine 0.81 03/20/2019 09:46 AM    BUN/Creatinine ratio 21 03/20/2019 09:46 AM    GFR est AA 91 03/20/2019 09:46 AM    GFR est non-AA 79 03/20/2019 09:46 AM    Calcium 9.3 03/20/2019 09:46 AM     Lab Results   Component Value Date/Time    Hemoglobin A1c 8.2 (H) 05/13/2015 08:17 AM    Hemoglobin A1c (POC) 7.7 10/01/2019 10:27 AM     Lab Results   Component Value Date/Time    Cholesterol, total 173 03/20/2019 09:46 AM    HDL Cholesterol 64 03/20/2019 09:46 AM    LDL, calculated 92 03/20/2019 09:46 AM    VLDL, calculated 17 03/20/2019 09:46 AM    Triglyceride 83 03/20/2019 09:46 AM    CHOL/HDL Ratio 2.4 06/14/2010 09:48 AM     Lab Results   Component Value Date/Time    Vitamin D 25-Hydroxy 35.2 08/08/2011 09:21 AM    VITAMIN D, 25-HYDROXY 36.7 03/20/2019 09:46 AM       Lab Results   Component Value Date/Time    TSH 1.660 03/20/2019 09:46 AM

## 2019-11-07 DIAGNOSIS — E11.65 UNCONTROLLED TYPE 2 DIABETES MELLITUS WITH HYPERGLYCEMIA, WITHOUT LONG-TERM CURRENT USE OF INSULIN (HCC): ICD-10-CM

## 2019-11-07 RX ORDER — GLIMEPIRIDE 2 MG/1
TABLET ORAL
Qty: 90 TAB | Refills: 0 | Status: SHIPPED | OUTPATIENT
Start: 2019-11-07 | End: 2020-01-13

## 2020-01-06 DIAGNOSIS — E11.65 UNCONTROLLED TYPE 2 DIABETES MELLITUS WITH HYPERGLYCEMIA (HCC): ICD-10-CM

## 2020-01-06 DIAGNOSIS — E11.65 UNCONTROLLED TYPE 2 DIABETES MELLITUS WITH HYPERGLYCEMIA, WITHOUT LONG-TERM CURRENT USE OF INSULIN (HCC): ICD-10-CM

## 2020-01-12 DIAGNOSIS — E11.65 UNCONTROLLED TYPE 2 DIABETES MELLITUS WITH HYPERGLYCEMIA, WITHOUT LONG-TERM CURRENT USE OF INSULIN (HCC): ICD-10-CM

## 2020-01-12 DIAGNOSIS — E11.65 UNCONTROLLED TYPE 2 DIABETES MELLITUS WITH HYPERGLYCEMIA (HCC): ICD-10-CM

## 2020-01-13 RX ORDER — GLIMEPIRIDE 2 MG/1
TABLET ORAL
Qty: 30 TAB | Refills: 0 | Status: SHIPPED | OUTPATIENT
Start: 2020-01-13 | End: 2020-01-27 | Stop reason: SDUPTHER

## 2020-01-13 RX ORDER — METFORMIN HYDROCHLORIDE 500 MG/1
TABLET ORAL
Qty: 120 TAB | Refills: 0 | Status: SHIPPED | OUTPATIENT
Start: 2020-01-13 | End: 2020-01-22

## 2020-01-27 ENCOUNTER — OFFICE VISIT (OUTPATIENT)
Dept: FAMILY MEDICINE CLINIC | Age: 60
End: 2020-01-27

## 2020-01-27 VITALS
SYSTOLIC BLOOD PRESSURE: 128 MMHG | BODY MASS INDEX: 23.86 KG/M2 | WEIGHT: 152 LBS | OXYGEN SATURATION: 99 % | HEIGHT: 67 IN | RESPIRATION RATE: 18 BRPM | HEART RATE: 72 BPM | DIASTOLIC BLOOD PRESSURE: 78 MMHG | TEMPERATURE: 97.9 F

## 2020-01-27 DIAGNOSIS — E11.65 UNCONTROLLED TYPE 2 DIABETES MELLITUS WITH HYPERGLYCEMIA, WITHOUT LONG-TERM CURRENT USE OF INSULIN (HCC): ICD-10-CM

## 2020-01-27 DIAGNOSIS — Z79.4 TYPE 2 DIABETES MELLITUS WITHOUT COMPLICATION, WITH LONG-TERM CURRENT USE OF INSULIN (HCC): Primary | ICD-10-CM

## 2020-01-27 DIAGNOSIS — E11.9 TYPE 2 DIABETES MELLITUS WITHOUT COMPLICATION, WITH LONG-TERM CURRENT USE OF INSULIN (HCC): Primary | ICD-10-CM

## 2020-01-27 DIAGNOSIS — E11.65 UNCONTROLLED TYPE 2 DIABETES MELLITUS WITH HYPERGLYCEMIA (HCC): ICD-10-CM

## 2020-01-27 LAB — HBA1C MFR BLD HPLC: 7.8 %

## 2020-01-27 RX ORDER — METFORMIN HYDROCHLORIDE 500 MG/1
TABLET ORAL
Qty: 360 TAB | Refills: 0 | Status: SHIPPED | OUTPATIENT
Start: 2020-01-27 | End: 2020-01-29 | Stop reason: SDUPTHER

## 2020-01-27 RX ORDER — GLIMEPIRIDE 2 MG/1
TABLET ORAL
Qty: 90 TAB | Refills: 0 | Status: SHIPPED | OUTPATIENT
Start: 2020-01-27 | End: 2020-04-01 | Stop reason: SDUPTHER

## 2020-01-27 NOTE — PROGRESS NOTES
Adonis Angelucci is a 62 y.o. female  HIPAA verified by two patient identifiers. Health Maintenance Due   Topic    Shingrix Vaccine Age 50> (1 of 2)    FOOT EXAM Q1     COLONOSCOPY      Chief Complaint   Patient presents with    Diabetes     3 month follow up     Visit Vitals  /78 (BP 1 Location: Left arm, BP Patient Position: Sitting)   Pulse 72   Temp 97.9 °F (36.6 °C) (Oral)   Resp 18   Ht 5' 7\" (1.702 m)   Wt 152 lb (68.9 kg)   LMP 05/31/2010   SpO2 99%   BMI 23.81 kg/m²       Pain Scale: 0 - No pain/10  Pain Location:   1. Have you been to the ER, urgent care clinic since your last visit? Hospitalized since your last visit? No    2. Have you seen or consulted any other health care providers outside of the [address] [place] [address] since your last visit? Include any pap smears or colon screening.  No

## 2020-01-27 NOTE — PROGRESS NOTES
Here for f/u of [facility]. Granddaughter with pink eye. Then son got it. Using OTC med for sleep. Patient denies chest pain, dyspnea, unexpected weight change, unexpected pain, mood or memory changes. Visit Vitals  /78 (BP 1 Location: Left arm, BP Patient Position: Sitting)   Pulse 72   Temp 97.9 °F (36.6 °C) (Oral)   Resp 18   Ht 5' 7\" (1.702 m)   Wt 152 lb (68.9 kg)   LMP 05/31/2010   SpO2 99%   BMI 23.81 kg/m²     Patient alert and cooperative. Reviewed above. Assessment:  1. Uncontrolled diabetes, about the same. Plan:  1. Recommended to try to take Glimepiride with her biggest meal at supper. 2. Return in three months, at which time will be due for annual labs, CHP. 3. Refilled diabetic meds. 4. Follow otherwise here prn.

## 2020-01-29 DIAGNOSIS — E11.65 UNCONTROLLED TYPE 2 DIABETES MELLITUS WITH HYPERGLYCEMIA (HCC): ICD-10-CM

## 2020-01-29 DIAGNOSIS — E11.65 UNCONTROLLED TYPE 2 DIABETES MELLITUS WITH HYPERGLYCEMIA, WITHOUT LONG-TERM CURRENT USE OF INSULIN (HCC): ICD-10-CM

## 2020-01-29 RX ORDER — METFORMIN HYDROCHLORIDE 500 MG/1
TABLET ORAL
Qty: 360 TAB | Refills: 1 | Status: SHIPPED | OUTPATIENT
Start: 2020-01-29 | End: 2020-10-21 | Stop reason: SDUPTHER

## 2020-01-29 NOTE — TELEPHONE ENCOUNTER
Patient needs her metformin sent to [facility] pharmacy for 90 days at a time, in order for her insurance to cover it. Per verbal order from Dr. Melly Pack, this order sent to the pharmacy.

## 2020-02-06 DIAGNOSIS — E11.65 UNCONTROLLED TYPE 2 DIABETES MELLITUS WITH HYPERGLYCEMIA, WITHOUT LONG-TERM CURRENT USE OF INSULIN (HCC): ICD-10-CM

## 2020-02-06 DIAGNOSIS — E11.65 UNCONTROLLED TYPE 2 DIABETES MELLITUS WITH HYPERGLYCEMIA (HCC): ICD-10-CM

## 2020-02-06 NOTE — TELEPHONE ENCOUNTER
PCP: Chelsie Azul MD    Last appt: 1/27/2020  Future Appointments   Date Time Provider Zack Davis   4/27/2020  8:15 AM Khoa Moreno MD [facility]       Requested Prescriptions     Pending Prescriptions Disp Refills    empagliflozin (JARDIANCE) 25 mg tablet 90 Tab 0     Sig: TAKE 1 TABLET BY [address] [facility]    Patient has ? days' supply of medication available.     Prior labs and Blood pressures:  BP Readings from Last 3 Encounters:   01/27/20 128/78   10/01/19 123/73   06/20/19 138/85     Lab Results   Component Value Date/Time    Sodium 138 03/20/2019 09:46 AM    Potassium 4.4 03/20/2019 09:46 AM    Chloride 99 03/20/2019 09:46 AM    CO2 24 03/20/2019 09:46 AM    Anion gap 7 06/14/2010 09:48 AM    Glucose 147 (H) 03/20/2019 09:46 AM    BUN 17 03/20/2019 09:46 AM    Creatinine 0.81 03/20/2019 09:46 AM    BUN/Creatinine ratio 21 03/20/2019 09:46 AM    GFR est AA 91 03/20/2019 09:46 AM    GFR est non-AA 79 03/20/2019 09:46 AM    Calcium 9.3 03/20/2019 09:46 AM     Lab Results   Component Value Date/Time    Hemoglobin A1c 8.2 (H) 05/13/2015 08:17 AM    Hemoglobin A1c (POC) 7.8 01/27/2020 10:00 AM     Lab Results   Component Value Date/Time    Cholesterol, total 173 03/20/2019 09:46 AM    HDL Cholesterol 64 03/20/2019 09:46 AM    LDL, calculated 92 03/20/2019 09:46 AM    VLDL, calculated 17 03/20/2019 09:46 AM    Triglyceride 83 03/20/2019 09:46 AM    CHOL/HDL Ratio 2.4 06/14/2010 09:48 AM     Lab Results   Component Value Date/Time    Vitamin D 25-Hydroxy 35.2 08/08/2011 09:21 AM    VITAMIN D, 25-HYDROXY 36.7 03/20/2019 09:46 AM       Lab Results   Component Value Date/Time    TSH 1.660 03/20/2019 09:46 AM

## 2020-04-01 DIAGNOSIS — E11.65 UNCONTROLLED TYPE 2 DIABETES MELLITUS WITH HYPERGLYCEMIA, WITHOUT LONG-TERM CURRENT USE OF INSULIN (HCC): ICD-10-CM

## 2020-04-01 RX ORDER — GLIMEPIRIDE 2 MG/1
TABLET ORAL
Qty: 90 TAB | Refills: 0 | Status: SHIPPED | OUTPATIENT
Start: 2020-04-01 | End: 2020-07-27

## 2020-04-14 DIAGNOSIS — I10 ESSENTIAL HYPERTENSION: ICD-10-CM

## 2020-04-14 DIAGNOSIS — E11.65 UNCONTROLLED TYPE 2 DIABETES MELLITUS WITH HYPERGLYCEMIA, WITHOUT LONG-TERM CURRENT USE OF INSULIN (HCC): ICD-10-CM

## 2020-04-14 RX ORDER — ENALAPRIL MALEATE 10 MG/1
TABLET ORAL
Qty: 90 TAB | Refills: 0 | Status: SHIPPED | OUTPATIENT
Start: 2020-04-14 | End: 2020-08-10

## 2020-04-14 NOTE — TELEPHONE ENCOUNTER
Requested Prescriptions     Pending Prescriptions Disp Refills    enalapril (VASOTEC) 10 mg tablet 90 Tab 1     Sig: TAKE 1 TABLET BY MOUTH EVERY DAY

## 2020-04-15 DIAGNOSIS — I10 ESSENTIAL HYPERTENSION: ICD-10-CM

## 2020-04-15 RX ORDER — TRAZODONE HYDROCHLORIDE 50 MG/1
50 TABLET ORAL
Qty: 90 TAB | Refills: 0 | Status: SHIPPED | OUTPATIENT
Start: 2020-04-15 | End: 2020-08-12

## 2020-04-15 RX ORDER — TRIAMTERENE/HYDROCHLOROTHIAZID 37.5-25 MG
TABLET ORAL
Qty: 90 TAB | Refills: 0 | Status: SHIPPED | OUTPATIENT
Start: 2020-04-15 | End: 2020-07-20

## 2020-04-15 NOTE — TELEPHONE ENCOUNTER
Requested Prescriptions     Pending Prescriptions Disp Refills    triamterene-hydroCHLOROthiazide (MAXZIDE) 37.5-25 mg per tablet 90 Tab 2     Sig: TAKE 1 TABLET BY MOUTH EVERY DAY    traZODone (DESYREL) 50 mg tablet       Sig: Take 1 Tab by mouth nightly.

## 2020-04-24 DIAGNOSIS — E11.65 UNCONTROLLED TYPE 2 DIABETES MELLITUS WITH HYPERGLYCEMIA (HCC): ICD-10-CM

## 2020-04-24 DIAGNOSIS — E11.65 UNCONTROLLED TYPE 2 DIABETES MELLITUS WITH HYPERGLYCEMIA, WITHOUT LONG-TERM CURRENT USE OF INSULIN (HCC): ICD-10-CM

## 2020-04-24 NOTE — TELEPHONE ENCOUNTER
Requested Prescriptions     Pending Prescriptions Disp Refills    empagliflozin (Jardiance) 25 mg tablet 90 Tab 0     Sig: TAKE 1 TABLET BY MOUTH EVERY DAY

## 2020-08-20 DIAGNOSIS — I10 ESSENTIAL HYPERTENSION: ICD-10-CM

## 2020-08-22 RX ORDER — TRIAMTERENE/HYDROCHLOROTHIAZID 37.5-25 MG
TABLET ORAL
Qty: 30 TAB | Refills: 0 | Status: SHIPPED | OUTPATIENT
Start: 2020-08-22 | End: 2020-09-21

## 2020-09-10 RX ORDER — TRAZODONE HYDROCHLORIDE 50 MG/1
TABLET ORAL
Qty: 30 TAB | Refills: 0 | Status: SHIPPED | OUTPATIENT
Start: 2020-09-10 | End: 2021-03-19

## 2020-09-19 DIAGNOSIS — I10 ESSENTIAL HYPERTENSION: ICD-10-CM

## 2020-09-21 RX ORDER — TRIAMTERENE/HYDROCHLOROTHIAZID 37.5-25 MG
TABLET ORAL
Qty: 30 TAB | Refills: 0 | Status: SHIPPED | OUTPATIENT
Start: 2020-09-21 | End: 2021-02-03 | Stop reason: SDUPTHER

## 2020-09-22 RX ORDER — TRAZODONE HYDROCHLORIDE 50 MG/1
TABLET ORAL
Qty: 30 TAB | Refills: 0 | Status: CANCELLED | OUTPATIENT
Start: 2020-09-22

## 2020-10-06 DIAGNOSIS — E11.65 UNCONTROLLED TYPE 2 DIABETES MELLITUS WITH HYPERGLYCEMIA, WITHOUT LONG-TERM CURRENT USE OF INSULIN (HCC): ICD-10-CM

## 2020-10-06 DIAGNOSIS — I10 ESSENTIAL HYPERTENSION: ICD-10-CM

## 2020-10-06 RX ORDER — ENALAPRIL MALEATE 10 MG/1
TABLET ORAL
Qty: 90 TAB | Refills: 1 | Status: CANCELLED | OUTPATIENT
Start: 2020-10-06

## 2020-10-14 DIAGNOSIS — I10 ESSENTIAL HYPERTENSION: ICD-10-CM

## 2020-10-14 DIAGNOSIS — E11.65 UNCONTROLLED TYPE 2 DIABETES MELLITUS WITH HYPERGLYCEMIA, WITHOUT LONG-TERM CURRENT USE OF INSULIN (HCC): ICD-10-CM

## 2020-10-14 RX ORDER — ENALAPRIL MALEATE 10 MG/1
10 TABLET ORAL DAILY
Qty: 90 TAB | Refills: 0 | Status: SHIPPED | OUTPATIENT
Start: 2020-10-14 | End: 2021-01-19 | Stop reason: SDUPTHER

## 2020-10-14 NOTE — TELEPHONE ENCOUNTER
PCP: Guille Wells MD    Last appt: Visit date not found  No future appointments.     Requested Prescriptions     Pending Prescriptions Disp Refills    enalapril (VASOTEC) 10 mg tablet 30 Tab 0       Pharmacy verified: YES     Prior labs and Blood pressures:  BP Readings from Last 3 Encounters:   01/27/20 128/78   10/01/19 123/73   06/20/19 138/85     Lab Results   Component Value Date/Time    Sodium 138 03/20/2019 09:46 AM    Potassium 4.4 03/20/2019 09:46 AM    Chloride 99 03/20/2019 09:46 AM    CO2 24 03/20/2019 09:46 AM    Anion gap 7 06/14/2010 09:48 AM    Glucose 147 (H) 03/20/2019 09:46 AM    BUN 17 03/20/2019 09:46 AM    Creatinine 0.81 03/20/2019 09:46 AM    BUN/Creatinine ratio 21 03/20/2019 09:46 AM    GFR est AA 91 03/20/2019 09:46 AM    GFR est non-AA 79 03/20/2019 09:46 AM    Calcium 9.3 03/20/2019 09:46 AM     Lab Results   Component Value Date/Time    Hemoglobin A1c 8.2 (H) 05/13/2015 08:17 AM    Hemoglobin A1c (POC) 7.8 01/27/2020 10:00 AM     Lab Results   Component Value Date/Time    Cholesterol, total 173 03/20/2019 09:46 AM    HDL Cholesterol 64 03/20/2019 09:46 AM    LDL, calculated 92 03/20/2019 09:46 AM    VLDL, calculated 17 03/20/2019 09:46 AM    Triglyceride 83 03/20/2019 09:46 AM    CHOL/HDL Ratio 2.4 06/14/2010 09:48 AM     Lab Results   Component Value Date/Time    Vitamin D 25-Hydroxy 35.2 08/08/2011 09:21 AM    VITAMIN D, 25-HYDROXY 36.7 03/20/2019 09:46 AM       Lab Results   Component Value Date/Time    TSH 1.660 03/20/2019 09:46 AM

## 2020-10-21 DIAGNOSIS — E11.65 UNCONTROLLED TYPE 2 DIABETES MELLITUS WITH HYPERGLYCEMIA, WITHOUT LONG-TERM CURRENT USE OF INSULIN (HCC): ICD-10-CM

## 2020-10-21 DIAGNOSIS — E11.65 UNCONTROLLED TYPE 2 DIABETES MELLITUS WITH HYPERGLYCEMIA (HCC): ICD-10-CM

## 2020-10-28 DIAGNOSIS — E11.65 UNCONTROLLED TYPE 2 DIABETES MELLITUS WITH HYPERGLYCEMIA (HCC): ICD-10-CM

## 2020-10-28 DIAGNOSIS — E11.65 UNCONTROLLED TYPE 2 DIABETES MELLITUS WITH HYPERGLYCEMIA, WITHOUT LONG-TERM CURRENT USE OF INSULIN (HCC): ICD-10-CM

## 2020-10-28 NOTE — TELEPHONE ENCOUNTER
PCP: Hilton Red MD    Last appt: Visit date not found  Future Appointments   Date Time Provider Zack Davis   11/20/2020  9:30 AM Irene Love NP [facility]       Requested Prescriptions      No prescriptions requested or ordered in this encounter       Prior labs and Blood pressures:  BP Readings from Last 3 Encounters:   01/27/20 128/78   10/01/19 123/73   06/20/19 138/85     Lab Results   Component Value Date/Time    Sodium 138 03/20/2019 09:46 AM    Potassium 4.4 03/20/2019 09:46 AM    Chloride 99 03/20/2019 09:46 AM    CO2 24 03/20/2019 09:46 AM    Anion gap 7 06/14/2010 09:48 AM    Glucose 147 (H) 03/20/2019 09:46 AM    BUN 17 03/20/2019 09:46 AM    Creatinine 0.81 03/20/2019 09:46 AM    BUN/Creatinine ratio 21 03/20/2019 09:46 AM    GFR est AA 91 03/20/2019 09:46 AM    GFR est non-AA 79 03/20/2019 09:46 AM    Calcium 9.3 03/20/2019 09:46 AM     Lab Results   Component Value Date/Time    Hemoglobin A1c 8.2 (H) 05/13/2015 08:17 AM    Hemoglobin A1c (POC) 7.8 01/27/2020 10:00 AM     Lab Results   Component Value Date/Time    Cholesterol, total 173 03/20/2019 09:46 AM    HDL Cholesterol 64 03/20/2019 09:46 AM    LDL, calculated 92 03/20/2019 09:46 AM    VLDL, calculated 17 03/20/2019 09:46 AM    Triglyceride 83 03/20/2019 09:46 AM    CHOL/HDL Ratio 2.4 06/14/2010 09:48 AM     Lab Results   Component Value Date/Time    Vitamin D 25-Hydroxy 35.2 08/08/2011 09:21 AM    VITAMIN D, 25-HYDROXY 36.7 03/20/2019 09:46 AM       Lab Results   Component Value Date/Time    TSH 1.660 03/20/2019 09:46 AM

## 2020-10-29 RX ORDER — METFORMIN HYDROCHLORIDE 500 MG/1
TABLET ORAL
Qty: 360 TAB | Refills: 0 | Status: SHIPPED | OUTPATIENT
Start: 2020-10-29 | End: 2020-12-02

## 2020-11-06 DIAGNOSIS — E11.65 UNCONTROLLED TYPE 2 DIABETES MELLITUS WITH HYPERGLYCEMIA, WITHOUT LONG-TERM CURRENT USE OF INSULIN (HCC): ICD-10-CM

## 2020-11-06 DIAGNOSIS — E11.65 UNCONTROLLED TYPE 2 DIABETES MELLITUS WITH HYPERGLYCEMIA (HCC): ICD-10-CM

## 2020-11-06 NOTE — TELEPHONE ENCOUNTER
PCP: Dilma Wilcox MD    Last appt: Visit date not found  Future Appointments   Date Time Provider Zack Davis   11/20/2020  9:30 AM Katrin Ayers NP [facility]       Requested Prescriptions     Pending Prescriptions Disp Refills    empagliflozin (Jardiance) 25 mg tablet 90 Tab 0     Sig: TAKE 1 TABLET BY MOUTH EVERY DAY       Prior labs and Blood pressures:  BP Readings from Last 3 Encounters:   01/27/20 128/78   10/01/19 123/73   06/20/19 138/85     Lab Results   Component Value Date/Time    Sodium 138 03/20/2019 09:46 AM    Potassium 4.4 03/20/2019 09:46 AM    Chloride 99 03/20/2019 09:46 AM    CO2 24 03/20/2019 09:46 AM    Anion gap 7 06/14/2010 09:48 AM    Glucose 147 (H) 03/20/2019 09:46 AM    BUN 17 03/20/2019 09:46 AM    Creatinine 0.81 03/20/2019 09:46 AM    BUN/Creatinine ratio 21 03/20/2019 09:46 AM    GFR est AA 91 03/20/2019 09:46 AM    GFR est non-AA 79 03/20/2019 09:46 AM    Calcium 9.3 03/20/2019 09:46 AM     Lab Results   Component Value Date/Time    Hemoglobin A1c 8.2 (H) 05/13/2015 08:17 AM    Hemoglobin A1c (POC) 7.8 01/27/2020 10:00 AM     Lab Results   Component Value Date/Time    Cholesterol, total 173 03/20/2019 09:46 AM    HDL Cholesterol 64 03/20/2019 09:46 AM    LDL, calculated 92 03/20/2019 09:46 AM    VLDL, calculated 17 03/20/2019 09:46 AM    Triglyceride 83 03/20/2019 09:46 AM    CHOL/HDL Ratio 2.4 06/14/2010 09:48 AM     Lab Results   Component Value Date/Time    Vitamin D 25-Hydroxy 35.2 08/08/2011 09:21 AM    VITAMIN D, 25-HYDROXY 36.7 03/20/2019 09:46 AM       Lab Results   Component Value Date/Time    TSH 1.660 03/20/2019 09:46 AM

## 2020-11-20 ENCOUNTER — OFFICE VISIT (OUTPATIENT)
Dept: FAMILY MEDICINE CLINIC | Age: 60
End: 2020-11-20
Payer: COMMERCIAL

## 2020-11-20 VITALS
HEIGHT: 67 IN | OXYGEN SATURATION: 98 % | DIASTOLIC BLOOD PRESSURE: 83 MMHG | HEART RATE: 67 BPM | RESPIRATION RATE: 17 BRPM | WEIGHT: 153.7 LBS | SYSTOLIC BLOOD PRESSURE: 131 MMHG | BODY MASS INDEX: 24.12 KG/M2 | TEMPERATURE: 97.1 F

## 2020-11-20 DIAGNOSIS — E55.9 VITAMIN D DEFICIENCY: Primary | ICD-10-CM

## 2020-11-20 DIAGNOSIS — D64.9 LOW HEMOGLOBIN: ICD-10-CM

## 2020-11-20 DIAGNOSIS — Z00.00 LABORATORY EXAM ORDERED AS PART OF ROUTINE GENERAL MEDICAL EXAMINATION: ICD-10-CM

## 2020-11-20 DIAGNOSIS — E11.21 TYPE 2 DIABETES WITH NEPHROPATHY (HCC): ICD-10-CM

## 2020-11-20 DIAGNOSIS — E11.65 UNCONTROLLED TYPE 2 DIABETES MELLITUS WITH HYPERGLYCEMIA (HCC): ICD-10-CM

## 2020-11-20 DIAGNOSIS — Z23 ENCOUNTER FOR IMMUNIZATION: ICD-10-CM

## 2020-11-20 DIAGNOSIS — E11.65 UNCONTROLLED TYPE 2 DIABETES MELLITUS WITH HYPERGLYCEMIA, WITHOUT LONG-TERM CURRENT USE OF INSULIN (HCC): ICD-10-CM

## 2020-11-20 DIAGNOSIS — I10 ESSENTIAL HYPERTENSION: ICD-10-CM

## 2020-11-20 LAB
25(OH)D3 SERPL-MCNC: 39 NG/ML (ref 30–100)
ALBUMIN SERPL-MCNC: 4.4 G/DL (ref 3.5–5)
ALBUMIN/GLOB SERPL: 1.4 {RATIO} (ref 1.1–2.2)
ALP SERPL-CCNC: 102 U/L (ref 45–117)
ALT SERPL-CCNC: 15 U/L (ref 12–78)
ANION GAP SERPL CALC-SCNC: 5 MMOL/L (ref 5–15)
APPEARANCE UR: CLEAR
AST SERPL-CCNC: 10 U/L (ref 15–37)
BACTERIA URNS QL MICRO: NEGATIVE /HPF
BASOPHILS # BLD: 0 K/UL (ref 0–0.1)
BASOPHILS NFR BLD: 1 % (ref 0–1)
BILIRUB SERPL-MCNC: 0.4 MG/DL (ref 0.2–1)
BILIRUB UR QL: NEGATIVE
BUN SERPL-MCNC: 23 MG/DL (ref 6–20)
BUN/CREAT SERPL: 23 (ref 12–20)
CALCIUM SERPL-MCNC: 9.7 MG/DL (ref 8.5–10.1)
CHLORIDE SERPL-SCNC: 102 MMOL/L (ref 97–108)
CHOLEST SERPL-MCNC: 168 MG/DL
CO2 SERPL-SCNC: 31 MMOL/L (ref 21–32)
COLOR UR: ABNORMAL
CREAT SERPL-MCNC: 0.98 MG/DL (ref 0.55–1.02)
CREAT UR-MCNC: 64.4 MG/DL
DIFFERENTIAL METHOD BLD: ABNORMAL
EOSINOPHIL # BLD: 0.1 K/UL (ref 0–0.4)
EOSINOPHIL NFR BLD: 2 % (ref 0–7)
EPITH CASTS URNS QL MICRO: ABNORMAL /LPF
ERYTHROCYTE [DISTWIDTH] IN BLOOD BY AUTOMATED COUNT: 12.6 % (ref 11.5–14.5)
GLOBULIN SER CALC-MCNC: 3.1 G/DL (ref 2–4)
GLUCOSE SERPL-MCNC: 154 MG/DL (ref 65–100)
GLUCOSE UR STRIP.AUTO-MCNC: >1000 MG/DL
HCT VFR BLD AUTO: 35.8 % (ref 35–47)
HDLC SERPL-MCNC: 68 MG/DL
HDLC SERPL: 2.5 {RATIO} (ref 0–5)
HGB BLD-MCNC: 11.2 G/DL (ref 11.5–16)
HGB UR QL STRIP: NEGATIVE
HYALINE CASTS URNS QL MICRO: ABNORMAL /LPF (ref 0–5)
IMM GRANULOCYTES # BLD AUTO: 0 K/UL (ref 0–0.04)
IMM GRANULOCYTES NFR BLD AUTO: 0 % (ref 0–0.5)
KETONES UR QL STRIP.AUTO: NEGATIVE MG/DL
LDLC SERPL CALC-MCNC: 85.4 MG/DL (ref 0–100)
LEUKOCYTE ESTERASE UR QL STRIP.AUTO: NEGATIVE
LIPID PROFILE,FLP: NORMAL
LYMPHOCYTES # BLD: 1.5 K/UL (ref 0.8–3.5)
LYMPHOCYTES NFR BLD: 41 % (ref 12–49)
MCH RBC QN AUTO: 29.1 PG (ref 26–34)
MCHC RBC AUTO-ENTMCNC: 31.3 G/DL (ref 30–36.5)
MCV RBC AUTO: 93 FL (ref 80–99)
MICROALBUMIN UR-MCNC: 0.72 MG/DL
MICROALBUMIN/CREAT UR-RTO: 11 MG/G (ref 0–30)
MONOCYTES # BLD: 0.3 K/UL (ref 0–1)
MONOCYTES NFR BLD: 7 % (ref 5–13)
NEUTS SEG # BLD: 1.8 K/UL (ref 1.8–8)
NEUTS SEG NFR BLD: 49 % (ref 32–75)
NITRITE UR QL STRIP.AUTO: NEGATIVE
NRBC # BLD: 0 K/UL (ref 0–0.01)
NRBC BLD-RTO: 0 PER 100 WBC
PH UR STRIP: 5.5 [PH] (ref 5–8)
PLATELET # BLD AUTO: 350 K/UL (ref 150–400)
PMV BLD AUTO: 11.1 FL (ref 8.9–12.9)
POTASSIUM SERPL-SCNC: 4 MMOL/L (ref 3.5–5.1)
PROT SERPL-MCNC: 7.5 G/DL (ref 6.4–8.2)
PROT UR STRIP-MCNC: NEGATIVE MG/DL
RBC # BLD AUTO: 3.85 M/UL (ref 3.8–5.2)
RBC #/AREA URNS HPF: ABNORMAL /HPF (ref 0–5)
SODIUM SERPL-SCNC: 138 MMOL/L (ref 136–145)
SP GR UR REFRACTOMETRY: 1.03
TRIGL SERPL-MCNC: 73 MG/DL (ref ?–150)
TSH SERPL DL<=0.05 MIU/L-ACNC: 0.8 UIU/ML (ref 0.36–3.74)
UA: UC IF INDICATED,UAUC: ABNORMAL
UROBILINOGEN UR QL STRIP.AUTO: 0.2 EU/DL (ref 0.2–1)
VLDLC SERPL CALC-MCNC: 14.6 MG/DL
WBC # BLD AUTO: 3.8 K/UL (ref 3.6–11)
WBC URNS QL MICRO: ABNORMAL /HPF (ref 0–4)

## 2020-11-20 PROCEDURE — 90732 PPSV23 VACC 2 YRS+ SUBQ/IM: CPT

## 2020-11-20 PROCEDURE — 3051F HG A1C>EQUAL 7.0%<8.0%: CPT

## 2020-11-20 PROCEDURE — 90471 IMMUNIZATION ADMIN: CPT

## 2020-11-20 PROCEDURE — 99213 OFFICE O/P EST LOW 20 MIN: CPT

## 2020-11-20 RX ORDER — DULAGLUTIDE 0.75 MG/.5ML
0.75 INJECTION, SOLUTION SUBCUTANEOUS
Qty: 3 PEN | Refills: 1 | Status: SHIPPED | OUTPATIENT
Start: 2020-11-20 | End: 2021-02-03 | Stop reason: SDUPTHER

## 2020-11-20 NOTE — PROGRESS NOTES
Selena Nickerson is a 56 y.o. female    HIPAA verified by two patient identifiers. Chief Complaint   Patient presents with    Diabetes     A1C check     Labs     Pt is fasting     Medication Refill     All except Jardiance      1. Have you been to the ER, urgent care clinic since your last visit? Hospitalized since your last visit? No    2. Have you seen or consulted any other health care providers outside of the [address] [place] [address] since your last visit? Include any pap smears or colon screening.  No    Visit Vitals  /83 (BP 1 Location: Left arm, BP Patient Position: Sitting)   Pulse 67   Temp 97.1 °F (36.2 °C) (Temporal)   Resp 17   Ht 5' 7\" (1.702 m)   Wt 153 lb 11.2 oz (69.7 kg)   LMP 05/31/2010   SpO2 98%   BMI 24.07 kg/m²       Pain Scale: 0 - No pain/10  Pain Location:

## 2020-11-20 NOTE — PROGRESS NOTES
S: Flori Warner is a 56 y.o. yo female who presents for diabetes follow up. Assessment/Plan:    1. Uncontrolled type 2 diabetes mellitus with hyperglycemia ([address]  -current therapy: metformin 1000mg bid + jardiance 25mg + glimepiride 2mg  -A1c = 8.4% today (incr from 7.8% in 1/2020)  -D/C: jardiance + glimepiride  -TRIAL: Trulicity 2.97FW [id number]  -PNA 23 today      RTC 4-6 weeks for CPE/labs      Current therapy: metformin 1000mg bid + jardiance 25mg + glimepiride 2mg  + numbness/tingling - left toes - 4, 5th toe  + frequent urination/nocturia - sometimes frequent urination  A1c = 8.4%, (incr from 7.8% in 1/2020)    Flori Warner has maintained 153lbs since last visit     Diabetes Maintenance:  Statin: none  BP at goal: 131/83 - pt states a bit high  ACEI or ARB: enalapril 10mg  PPSV23: needs - today   Last Foot exam: today   Last Eye exam:  Due in December     Last [facility]:   Lab Results   Component Value Date/Time    Hemoglobin A1c 8.2 (H) 05/13/2015 08:17 AM    Hemoglobin A1c (POC) 7.8 01/27/2020 10:00 AM     Last lipids:   Lab Results   Component Value Date/Time    LDL, calculated 92 03/20/2019 09:46 AM     Last Cr:   Lab Results   Component Value Date/Time    Creatinine 0.81 03/20/2019 09:46 AM     CrCl cannot be calculated (Patient's most recent lab result is older than the maximum 180 days allowed. ).   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 I reviewed the following:  Past Medical History:   Diagnosis Date    Abnormal mammogram of right breast 7/1/16    [facility] -calcifications--bx planned    Advance directive discussed with patient 12/08/2015,06/22/2016    Diabetes ([address]     Encounter for surgical counseling     7/28/16 note from Dr Curt Marquis H/O breast biopsy 7/12/16     atypical ductal hyperplasia & calcifications    Herpes zoster 1/3/16    [facility] notes area on right shoulder    Hypertension     Pancreas inflammation 1/09    Sternoclavicular joint strain 10/31/2016    [facility] note  [facility] [place] 12/27/16 f/u    Toe pain 04/04/2017    [facility] note onychomycosis    Vitamin D deficiency         Current Outpatient Medications   Medication Sig Dispense Refill    empagliflozin (Jardiance) 25 mg tablet TAKE 1 TABLET BY MOUTH EVERY DAY 90 Tab 0    metFORMIN (GLUCOPHAGE) 500 mg tablet TAKE 2 TABLETS BY MOUTH TWICE A DAY WITH MEALS. 360 Tab 0    enalapril (VASOTEC) 10 mg tablet Take 1 Tab by mouth daily. Office visit and/or labs needed for future refills.  90 Tab 0    triamterene-hydroCHLOROthiazide (MAXZIDE) 37.5-25 mg per tablet TAKE 1 TABLET BY MOUTH EVERY DAY 30 Tab 0    traZODone (DESYREL) 50 mg tablet TAKE 1 TABLET BY MOUTH EVERY DAY AT NIGHT 30 Tab 0    glimepiride (AMARYL) 2 mg tablet TAKE 1 TABLET BY MOUTH EVERY DAY WITH SUPPER 90 Tab 0    raNITIdine (ZANTAC) 300 mg tab TAKE 1 TABLET BY MOUTH TWICE A DAY  3    Lactobac no.41/Bifidobact no.7 (PROBIOTIC-10 PO) Take  by mouth.      diphenhydrAMINE (BENADRYL) 25 mg tablet Take 50 mg by mouth every six (6) hours as needed.  Cholecalciferol, Vitamin D3, (VITAMIN D3) 1,000 unit cap Take 1,000 Units by mouth daily.  fluticasone (FLONASE) 50 mcg/actuation nasal spray 2 sprays per nostril daily (Patient taking differently: as needed. 2 sprays per nostril daily) 1 Bottle 0    OTHER Glucometer test strips-test 2-3 times per week 90 Strip 3        Allergies   Allergen Reactions    Trazodone Other (comments)     Hallucinations       O:   Visit Vitals  /83 (BP 1 Location: Left arm, BP Patient Position: Sitting)   Pulse 67   Temp 97.1 °F (36.2 °C) (Temporal)   Resp 17   Ht 5' 7\" (1.702 m)   Wt 153 lb 11.2 oz (69.7 kg)   LMP 05/31/2010   SpO2 98%   BMI 24.07 kg/m²        GENERAL: Shirley Schultz  is in no acute distress. Non-toxic. EYE: PERRLA. RESP: Unlabored without SOB. Speaking in full sentences. Breath sounds are symmetrical bilaterally. Clear to auscultation to all fields. No wheezes. No rales or rhonchi. CV: normal rate. Regular rhythm. S1, S2 audible. No murmur noted. No rubs, clicks or gallops noted. NEURO:  awake, alert and oriented to person, place, and time and event. Clear speech. Steady gait. HEME/LYMPH: pedal pulses palpable 2+. No peripheral edema is noted. SKIN: Skin is warm and dry. Turgor is normal. No petechiae, no purpura, no rash. No cyanosis. No mottling, jaundice or pallor. Diabetic Foot Exam  Skin: Intact no wounds or abrasions. + numbness or tingling in left foot, 4th and 5th digit No erythema or edema noted. Warm to touch.  Sensation intact  Left: Reflexes 2+     Vibratory sensation normal    Proprioception normal   Sharp/dull discrimination normal    Filament test normal sensation with micro filament   Pulse DP: 2+ (normal)   Pulse PT: 2+ (normal)   Deformities: None  Right: Reflexes 2+   Vibratory sensation normal   Proprioception normal   Sharp/dull discrimination normal   Filament test normal sensation with micro filament   Pulse DP: 2+ (normal)   Pulse PT: 2+ (normal)   Deformities: None  _______________________________________________________________  Patient education was done. Advised on nutrition, physical activity, weight management, tobacco, alcohol and safety. Medication risks/benefits,  interactions and alternatives discussed with patient. Advised of frequent feet checks. Advised yearly eye exam. Reviewed warning signs of hypertension, stroke and heart attack      Patient verbalized understanding and agreed with plan of care. Patient was given an after visit summary which included current diagnoses, medications and vital signs. Discussed BMI and healthy weight. Encouraged patient to work to implement changes including diet high in fruits, vegetables, lean protein and good fats. Limit refined, processed carbohydrates and sugar. Encouraged regular exercise.

## 2020-11-20 NOTE — PATIENT INSTRUCTIONS
1) Hemoglobin A1c is a 3 month average of your blood sugar and used as a marker of diabetes. Goal is less than 5.7%, and above 6.4% is considered diabetes. Your Hemoglobin A1c is 8.4% which means your blood sugar is not under better control compared to your A1c value when it was 7.8% at your last check. Currently you are taking metformin 1000mg bid + jardiance 25mg + glimepiride 2mg We will try Trulicity in place of  jardiance and glimepiride. Trulicity is a GLP (glucagon-like peptides) that work by working like the natural hormone incretin which increases B-cell growth and how much insulin your body uses. This is a WEEKLY injection that can be taken either in the morning or in the evening (and it doesn't matter if this is before or after a meal.)  Start with 0.75mg and it can be increased to 1.5mg weekly. The most common side effect is nausea. Watch out for any severe abdominal pain that goes to the back and is associated with nausea or vomiting as this may be due to pancreatitis which is a severe, but rare, side effect of this medication. Please notify me if this occurs. If a dose is missed, administer the injection as soon as possible - if there are at least 3 days (72 hours) until the next scheduled dose. If less than 3 days remain before the next scheduled dose, skip the missed dose and administer the next dose on the regularly scheduled day. In each case, resume your regular once weekly dosing schedule. 2) Labs The following blood work was ordered today. Once the tests are completed, you will receive a letter, email or phone call with the results. If you have not heard from us within 10-14 days, please call the office for the results. Complete Blood Count (CBC) helps evaluate your overall health, including hemoglobin and red blood cells that carry oxygen, white blood cells that fight infection and platelets that help with clotting. Complete Metabolic Panel (CMP) assesses electrolytes, kidney and liver function.  (A Basic Metabolic Panel (BMP) does not include liver function.) Electrolytes include sodium, potassium, calcium, and chloride. These are necessary for cell function and an imbalance can cause serious problems. BUN and creatinine are markers of kidney function. ALT and AST are markers of liver function. Total Cholesterol is the total number of cholesterol particles in your blood, which includes triglycerides, HDL and LDL. A small amount of cholesterol is needed for the body's cells, hormones, and metabolism. Goal is less than 200. Triglycerides are the short term fats in your blood which are used for energy. Goal is less than 150. High Density Lipids (HDL) is the \"good\" cholesterol in your blood. HDL helps remove bad cholesterol from your blood. Goal is more than 40. Low Density Lipids (LDL) is the \"bad\" cholesterol in your blood. LDL can stick to your arteries, raising the risk for heart attack and stroke. Goal is less than 100 Urinalysis - this is a test of your urine to check your overall health. It is recommended as a part of a routine check up and screens for a variety of disorders, such as urinary tract infections, kidney disease and diabetes. Your thyroid is responsible for secreting hormones and regulating your metabolism. Thyroid Stimulating Hormone (TSH) is a test for thyroid function. TSH is a hormone made by the pituitary gland in the brain and tells your thyroid gland to make hormones and release them into your blood. If your thyroid isn't producing enough hormone, you may have symptoms such as unexplained weight gain, fatigue, hair loss. If your thyroid is producing too much hormone, you may have symptoms of diarrhea, heart palpitations, fatigue, unexplained weight loss. A normal TSH value is between 0.45 - 4.5.  
 
Urine Analysis for Microalbumin/creatinine ratio is a marker of the amount of protein in your urine. Certain health conditions, such as diabetes and hypertension, can injury kidney function. A normal value is less than 30.   
 
2) Pneumovax 23 today Learning About Diabetes Food Guidelines Your Care Instructions Meal planning is important to manage diabetes. It helps keep your blood sugar at a target level (which you set with your doctor). You don't have to eat special foods. You can eat what your family eats, including sweets once in a while. But you do have to pay attention to how often you eat and how much you eat of certain foods. You may want to work with a dietitian or a certified diabetes educator (CDE) to help you plan meals and snacks. A dietitian or CDE can also help you lose weight if that is one of your goals. What should you know about eating carbs? Managing the amount of carbohydrate (carbs) you eat is an important part of healthy meals when you have diabetes. Carbohydrate is found in many foods. · Learn which foods have carbs. And learn the amounts of carbs in different foods. ? Bread, cereal, pasta, and rice have about 15 grams of carbs in a serving. A serving is 1 slice of bread (1 ounce), ½ cup of cooked cereal, or 1/3 cup of cooked pasta or rice. ? Fruits have 15 grams of carbs in a serving. A serving is 1 small fresh fruit, such as an apple or orange; ½ of a banana; ½ cup of cooked or canned fruit; ½ cup of fruit juice; 1 cup of melon or raspberries; or 2 tablespoons of dried fruit. ? Milk and no-sugar-added yogurt have 15 grams of carbs in a serving. A serving is 1 cup of milk or 2/3 cup of no-sugar-added yogurt. ? Starchy vegetables have 15 grams of carbs in a serving. A serving is ½ cup of mashed potatoes or sweet potato; 1 cup winter squash; ½ of a small baked potato; ½ cup of cooked beans; or ½ cup cooked corn or green peas.  
· Learn how much carbs to eat each day and at each meal. A dietitian or CDE can teach you how to keep track of the amount of carbs you eat. This is called carbohydrate counting. · If you are not sure how to count carbohydrate grams, use the Plate Method to plan meals. It is a good, quick way to make sure that you have a balanced meal. It also helps you spread carbs throughout the day. ? Divide your plate by types of foods. Put non-starchy vegetables on half the plate, meat or other protein food on one-quarter of the plate, and a grain or starchy vegetable in the final quarter of the plate. To this you can add a small piece of fruit and 1 cup of milk or yogurt, depending on how many carbs you are supposed to eat at a meal. 
· Try to eat about the same amount of carbs at each meal. Do not \"save up\" your daily allowance of carbs to eat at one meal. 
· Proteins have very little or no carbs per serving. Examples of proteins are beef, chicken, turkey, fish, eggs, tofu, cheese, cottage cheese, and peanut butter. A serving size of meat is 3 ounces, which is about the size of a deck of cards. Examples of meat substitute serving sizes (equal to 1 ounce of meat) are 1/4 cup of cottage cheese, 1 egg, 1 tablespoon of peanut butter, and ½ cup of tofu. How can you eat out and still eat healthy? · Learn to estimate the serving sizes of foods that have carbohydrate. If you measure food at home, it will be easier to estimate the amount in a serving of restaurant food. · If the meal you order has too much carbohydrate (such as potatoes, corn, or baked beans), ask to have a low-carbohydrate food instead. Ask for a salad or green vegetables. · If you use insulin, check your blood sugar before and after eating out to help you plan how much to eat in the future. · If you eat more carbohydrate at a meal than you had planned, take a walk or do other exercise. This will help lower your blood sugar. What else should you know? · Limit saturated fat, such as the fat from meat and dairy products.  This is a healthy choice because people who have diabetes are at higher risk of heart disease. So choose lean cuts of meat and nonfat or low-fat dairy products. Use olive or canola oil instead of butter or shortening when cooking. · Don't skip meals. Your blood sugar may drop too low if you skip meals and take insulin or certain medicines for diabetes. · Check with your doctor before you drink alcohol. Alcohol can cause your blood sugar to drop too low. Alcohol can also cause a bad reaction if you take certain diabetes medicines. Follow-up care is a key part of your treatment and safety. Be sure to make and go to all appointments, and call your doctor if you are having problems. It's also a good idea to know your test results and keep a list of the medicines you take. Where can you learn more? Go to http://www.gray.com/ Enter S596 in the search box to learn more about \"Learning About Diabetes Food Guidelines. \" Current as of: December 20, 2019               Content Version: 12.6 © 2401-6683 "[employer]. Care instructions adapted under license by [employer] (which disclaims liability or warranty for this information). If you have questions about a medical condition or this instruction, always ask your healthcare professional. [place] [address] any warranty or liability for your use of this information. Vaccine Information Statement Pneumococcal Polysaccharide Vaccine (PPSV23): What You Need to Know Many Vaccine Information Statements are available in [place] and other languages. See www.immunize.org/vis Hojas de información sobre vacunas están disponibles en español y en muchos otros idiomas. Visite www.immunize.org/vis 1. Why get vaccinated? Pneumococcal polysaccharide vaccine (PPSV23) can prevent pneumococcal disease.  
 
Pneumococcal disease refers to any illness caused by pneumococcal bacteria. These bacteria can cause many types of illnesses, including pneumonia, which is an infection of the lungs. Pneumococcal bacteria are one of the most common causes of pneumonia. Besides pneumonia, pneumococcal bacteria can also cause: 
 Ear infections  Sinus infections  Meningitis (infection of the tissue covering the brain and spinal cord)  Bacteremia (bloodstream infection) Anyone can get pneumococcal disease, but children under 3years of age, people with certain medical conditions, adults 72 years or older, and cigarette smokers are at the highest risk. Most pneumococcal infections are mild. However, some can result in long-term problems, such as brain damage or hearing loss. Meningitis, bacteremia, and pneumonia caused by pneumococcal disease can be fatal.  
 
2. PPSV23  
 
PPSV23 protects against 23 types of bacteria that cause pneumococcal disease. PPSV23 is recommended for:  All adults 72 years or older,  Anyone 2 years or older with certain medical conditions that can lead to an increased risk for pneumococcal disease. Most people need only one dose of PPSV23. A second dose of PPSV23, and another type of pneumococcal vaccine called PCV13, are recommended for certain high-risk groups. Your health care provider can give you more information. People 65 years or older should get a dose of PPSV23 even if they have already gotten one or more doses of the vaccine before they turned 72. 
 
3. Talk with your health care provider Tell your vaccine provider if the person getting the vaccine: 
 Has had an allergic reaction after a previous dose of PPSV23, or has any severe, life-threatening allergies. In some cases, your health care provider may decide to postpone PPSV23 vaccination to a future visit. People with minor illnesses, such as a cold, may be vaccinated. People who are moderately or severely ill should usually wait until they recover before getting PPSV23. Your health care provider can give you more information. 4. Risks of a vaccine reaction  Redness or pain where the shot is given, feeling tired, fever, or muscle aches can happen after PPSV23. People sometimes faint after medical procedures, including vaccination. Tell your provider if you feel dizzy or have vision changes or ringing in the ears. As with any medicine, there is a very remote chance of a vaccine causing a severe allergic reaction, other serious injury, or death. 5. What if there is a serious problem? An allergic reaction could occur after the vaccinated person leaves the clinic. If you see signs of a severe allergic reaction (hives, swelling of the face and throat, difficulty breathing, a fast heartbeat, dizziness, or weakness), call 9-1-1 and get the person to the nearest hospital. 
 
For other signs that concern you, call your health care provider. Adverse reactions should be reported to the Vaccine Adverse Event Reporting System (VAERS). Your health care provider will usually file this report, or you can do it yourself. Visit the VAERS website at www.vaers. hhs.gov or call 1-529.647.6047. VAERS is only for reporting reactions, and VAERS staff do not give medical advice. 6. How can I learn more?  Ask your health care provider.  Call your local or state health department.  Contact the Centers for Disease Control and Prevention (CDC): 
- Call 3-260.987.2328 (1-800-CDC-INFO) or 
- Visit CDCs website at www.cdc.gov/vaccines Vaccine Information Statement PPSV23  
10/30/2019 Department of Health and [place] Centers for Disease Control and Prevention Office Use Only

## 2020-11-23 PROBLEM — E11.21 TYPE 2 DIABETES WITH NEPHROPATHY (HCC): Status: ACTIVE | Noted: 2020-11-23

## 2020-11-27 ENCOUNTER — TELEPHONE (OUTPATIENT)
Dept: FAMILY MEDICINE CLINIC | Age: 60
End: 2020-11-27

## 2020-11-27 NOTE — TELEPHONE ENCOUNTER
[facility] called would like to know more information about an add on lab. The lab would like you to know that she cannot pull her add on lab.  Please call [phone]

## 2021-01-19 DIAGNOSIS — I10 ESSENTIAL HYPERTENSION: ICD-10-CM

## 2021-01-19 DIAGNOSIS — E11.65 UNCONTROLLED TYPE 2 DIABETES MELLITUS WITH HYPERGLYCEMIA, WITHOUT LONG-TERM CURRENT USE OF INSULIN (HCC): ICD-10-CM

## 2021-01-19 RX ORDER — ENALAPRIL MALEATE 10 MG/1
10 TABLET ORAL DAILY
Qty: 15 TAB | Refills: 0 | Status: SHIPPED | OUTPATIENT
Start: 2021-01-19 | End: 2021-02-03 | Stop reason: SDUPTHER

## 2021-01-19 NOTE — TELEPHONE ENCOUNTER
Last Refill: N/A  Last Visit: Visit date not found   Next Visit: 2/1/2021    Requested Prescriptions     Pending Prescriptions Disp Refills    enalapril (VASOTEC) 10 mg tablet 15 Tab 0     Sig: Take 1 Tab by mouth daily. Office visit and/or labs needed for future refills.

## 2021-02-03 ENCOUNTER — OFFICE VISIT (OUTPATIENT)
Dept: INTERNAL MEDICINE CLINIC | Age: 61
End: 2021-02-03
Payer: COMMERCIAL

## 2021-02-03 VITALS
HEART RATE: 84 BPM | OXYGEN SATURATION: 100 % | WEIGHT: 150.2 LBS | HEIGHT: 67 IN | DIASTOLIC BLOOD PRESSURE: 82 MMHG | RESPIRATION RATE: 16 BRPM | BODY MASS INDEX: 23.57 KG/M2 | SYSTOLIC BLOOD PRESSURE: 138 MMHG | TEMPERATURE: 97 F

## 2021-02-03 DIAGNOSIS — E11.69 HYPERLIPIDEMIA ASSOCIATED WITH TYPE 2 DIABETES MELLITUS (HCC): ICD-10-CM

## 2021-02-03 DIAGNOSIS — E78.5 HYPERLIPIDEMIA ASSOCIATED WITH TYPE 2 DIABETES MELLITUS (HCC): ICD-10-CM

## 2021-02-03 DIAGNOSIS — E11.65 UNCONTROLLED TYPE 2 DIABETES MELLITUS WITH HYPERGLYCEMIA, WITHOUT LONG-TERM CURRENT USE OF INSULIN (HCC): Primary | ICD-10-CM

## 2021-02-03 DIAGNOSIS — I10 ESSENTIAL HYPERTENSION: ICD-10-CM

## 2021-02-03 LAB
A-G RATIO,AGRAT: 2 RATIO
ALBUMIN SERPL-MCNC: 5 G/DL (ref 3.9–5.4)
ALP SERPL-CCNC: 110 U/L (ref 38–126)
ALT SERPL-CCNC: 14 U/L (ref 0–35)
ANION GAP SERPL CALC-SCNC: 14 MMOL/L
AST SERPL W P-5'-P-CCNC: 25 U/L (ref 14–36)
BILIRUB SERPL-MCNC: 0.6 MG/DL (ref 0.2–1.3)
BILIRUB UR QL: NEGATIVE
BUN SERPL-MCNC: 26 MG/DL (ref 7–17)
BUN/CREATININE RATIO,BUCR: 26 RATIO
CALCIUM SERPL-MCNC: 10.2 MG/DL (ref 8.4–10.2)
CHLORIDE SERPL-SCNC: 101 MMOL/L (ref 98–107)
CHOL/HDL RATIO,CHHD: 4 RATIO (ref 0–4)
CHOLEST SERPL-MCNC: 191 MG/DL (ref 0–200)
CLARITY: CLEAR
CO2 SERPL-SCNC: 26 MMOL/L (ref 22–32)
COLOR UR: ABNORMAL
CREAT SERPL-MCNC: 1 MG/DL (ref 0.7–1.2)
ERYTHROCYTE [DISTWIDTH] IN BLOOD BY AUTOMATED COUNT: 13.8 %
GLOBULIN,GLOB: 2.5
GLUCOSE 24H UR-MRATE: ABNORMAL G/(24.H)
GLUCOSE SERPL-MCNC: 200 MG/DL (ref 65–105)
HBA1C MFR BLD HPLC: 12.2 % (ref 4–5.7)
HCT VFR BLD AUTO: 38.9 % (ref 37–51)
HDLC SERPL-MCNC: 54 MG/DL (ref 35–130)
HGB BLD-MCNC: 12.3 G/DL (ref 12–18)
HGB UR QL STRIP: NEGATIVE
KETONES UR QL STRIP.AUTO: NEGATIVE
LDL/HDL RATIO,LDHD: 2 RATIO
LDLC SERPL CALC-MCNC: 105 MG/DL (ref 0–130)
LEUKOCYTE ESTERASE: NEGATIVE
LYMPHOCYTES ABSOLUTE: 1.6 K/UL (ref 0.6–4.1)
LYMPHOCYTES NFR BLD: 34.9 % (ref 10–58.5)
MCH RBC QN AUTO: 28.8 PG (ref 26–32)
MCHC RBC AUTO-ENTMCNC: 31.6 G/DL (ref 30–36)
MCV RBC AUTO: 91.1 FL (ref 80–97)
MICROALBUMIN, URINE: 20 MG/L (ref 0–20)
MONOCYTES ABS-DIF,2141: 0.3 K/UL (ref 0–1.8)
MONOCYTES NFR BLD: 7.4 % (ref 0.1–24)
NEUTROPHILS # BLD: 57.7 % (ref 37–92)
NEUTROPHILS ABS,2156: 2.7 K/UL (ref 2–7.8)
NITRITE UR QL STRIP.AUTO: NEGATIVE
PH UR STRIP: 5 [PH] (ref 5–7)
PLATELET # BLD AUTO: 332 K/UL (ref 140–440)
POTASSIUM SERPL-SCNC: 4.6 MMOL/L (ref 3.6–5)
PROT SERPL-MCNC: 7.5 G/DL (ref 6.3–8.2)
PROT UR STRIP-MCNC: NEGATIVE MG/DL
RBC # BLD AUTO: 4.27 M/UL (ref 4.2–6.3)
RBC #/AREA URNS HPF: 0 #/HPF
SODIUM SERPL-SCNC: 141 MMOL/L (ref 137–145)
SP GR UR REFRACTOMETRY: 1.01 (ref 1–1.03)
TRIGL SERPL-MCNC: 160 MG/DL (ref 0–200)
TSH SERPL DL<=0.05 MIU/L-ACNC: 0.76 UIU/ML (ref 0.34–5.6)
UROBILINOGEN UR QL STRIP.AUTO: NEGATIVE
VLDLC SERPL CALC-MCNC: 32 MG/DL
WBC # BLD AUTO: 4.6 K/UL (ref 4.1–10.9)
WBC URNS QL MICRO: 0 #/HPF

## 2021-02-03 PROCEDURE — 80061 LIPID PANEL: CPT | Performed by: NURSE PRACTITIONER

## 2021-02-03 PROCEDURE — 84443 ASSAY THYROID STIM HORMONE: CPT | Performed by: NURSE PRACTITIONER

## 2021-02-03 PROCEDURE — 85025 COMPLETE CBC W/AUTO DIFF WBC: CPT | Performed by: NURSE PRACTITIONER

## 2021-02-03 PROCEDURE — 82044 UR ALBUMIN SEMIQUANTITATIVE: CPT | Performed by: NURSE PRACTITIONER

## 2021-02-03 PROCEDURE — 83036 HEMOGLOBIN GLYCOSYLATED A1C: CPT | Performed by: NURSE PRACTITIONER

## 2021-02-03 PROCEDURE — 80053 COMPREHEN METABOLIC PANEL: CPT | Performed by: NURSE PRACTITIONER

## 2021-02-03 PROCEDURE — 81001 URINALYSIS AUTO W/SCOPE: CPT | Performed by: NURSE PRACTITIONER

## 2021-02-03 PROCEDURE — 99213 OFFICE O/P EST LOW 20 MIN: CPT | Performed by: NURSE PRACTITIONER

## 2021-02-03 RX ORDER — DULAGLUTIDE 0.75 MG/.5ML
0.75 INJECTION, SOLUTION SUBCUTANEOUS
Qty: 3 PEN | Refills: 1 | Status: SHIPPED | OUTPATIENT
Start: 2021-02-03 | End: 2021-02-19 | Stop reason: CLARIF

## 2021-02-03 RX ORDER — TRIAMTERENE/HYDROCHLOROTHIAZID 37.5-25 MG
1 TABLET ORAL DAILY
Qty: 30 TAB | Refills: 5 | Status: SHIPPED | OUTPATIENT
Start: 2021-02-03 | End: 2021-08-02

## 2021-02-03 RX ORDER — ENALAPRIL MALEATE 10 MG/1
10 TABLET ORAL DAILY
Qty: 30 TAB | Refills: 5 | Status: SHIPPED | OUTPATIENT
Start: 2021-02-03 | End: 2021-08-02

## 2021-02-03 RX ORDER — METFORMIN HYDROCHLORIDE 1000 MG/1
1000 TABLET ORAL 2 TIMES DAILY WITH MEALS
Qty: 60 TAB | Refills: 5 | Status: SHIPPED | OUTPATIENT
Start: 2021-02-03 | End: 2021-08-02

## 2021-02-03 RX ORDER — GLIPIZIDE 5 MG/1
5 TABLET ORAL 2 TIMES DAILY
Qty: 60 TAB | Refills: 5 | Status: SHIPPED | OUTPATIENT
Start: 2021-02-03 | End: 2021-08-02

## 2021-02-03 NOTE — PROGRESS NOTES
Kelsea Carmona is a 56 y.o. female    Chief Complaint   Patient presents with   [facility]     new patient       Visit Vitals  /82 (BP 1 Location: Left upper arm, BP Patient Position: Sitting, BP Cuff Size: Large adult)   Pulse 84   Temp 97 °F (36.1 °C)   Resp 16   Ht 5' 7\" (1.702 m)   Wt 150 lb 3.2 oz (68.1 kg)   LMP 05/31/2010   SpO2 100%   BMI 23.52 kg/m²           1. Have you been to the ER, urgent care clinic since your last visit? Hospitalized since your last visit? No     2. Have you seen or consulted any other health care providers outside of the [address] [place] [address] since your last visit? Include any pap smears or colon screening.  No

## 2021-02-03 NOTE — PATIENT INSTRUCTIONS
Check your blood sugar before breakfast and before dinner each day. Write down the results, and bring to your next appointment.

## 2021-02-03 NOTE — PROGRESS NOTES
Chief Complaint   Patient presents with    [facility]     new patient       SUBJECTIVE:    Chencho Perez is a 56 y.o. female who is here today for a follow up appointment regarding her diabetes and hypertension. Patient states she has been a diabetic for quite some time now, and had been prescribed Trulicity in November, and subsequently taken off her Jardiance and glimepiride. However, the patient began to have a gradual increase in her blood sugars overall to the point where she was staying in the 300s. At that time, and due to her previous medical provider closing office, she took it upon herself to also resume her Jardiance and glimepiride. She states her blood sugars have now come down slightly into the upper 100s on average. She denies any hypoglycemic events. In addition, the patient is being managed for hypertension. She was taking her medication as prescribed and since she finally ran out a couple of weeks ago. She is requesting for refills today. She denies any chest pain, chest pressure, headaches, dizziness, or visual disturbances. Current Outpatient Medications   Medication Sig Dispense Refill    enalapril (VASOTEC) 10 mg tablet Take 1 Tab by mouth daily. 30 Tab 5    triamterene-hydroCHLOROthiazide (MAXZIDE) 37.5-25 mg per tablet Take 1 Tab by mouth daily. Indications: high blood pressure 30 Tab 5    metFORMIN (GLUCOPHAGE) 1,000 mg tablet Take 1 Tab by mouth two (2) times daily (with meals). Indications: type 2 diabetes mellitus 60 Tab 5    dulaglutide (Trulicity) 7.94 LX/0.7 mL sub-q pen 0.5 mL by SubCUTAneous route every seven (7) days. 3 Pen 1    glipiZIDE (GLUCOTROL) 5 mg tablet Take 1 Tab by mouth two (2) times a day. 60 Tab 5    empagliflozin (Jardiance) 25 mg tablet TAKE 1 TABLET BY MOUTH EVERY DAY 90 Tab 0    Lactobac no.41/Bifidobact no.7 (PROBIOTIC-10 PO) Take  by mouth.       diphenhydrAMINE (BENADRYL) 25 mg tablet Take 50 mg by mouth every six (6) hours as needed.  Cholecalciferol, Vitamin D3, (VITAMIN D3) 1,000 unit cap Take 1,000 Units by mouth daily.  fluticasone (FLONASE) 50 mcg/actuation nasal spray 2 sprays per nostril daily (Patient taking differently: as needed. 2 sprays per nostril daily) 1 Bottle 0    OTHER Glucometer test strips-test 2-3 times per week 90 Strip 3    traZODone (DESYREL) 50 mg tablet TAKE 1 TABLET BY MOUTH EVERY DAY AT NIGHT 30 Tab 0    raNITIdine (ZANTAC) 300 mg tab TAKE 1 TABLET BY MOUTH TWICE A DAY  3     Past Medical History:   Diagnosis Date    Abnormal mammogram of right breast 7/1/16    [facility] -calcifications--bx planned    Advance directive discussed with patient 12/08/2015,06/22/2016    Diabetes [facility])     Encounter for surgical counseling     7/28/16 note from Dr Canelo Harris H/O breast biopsy 7/12/16     atypical ductal hyperplasia & calcifications    Herpes zoster 1/3/16    [facility] PF notes area on right shoulder    Hypertension     Pancreas inflammation 1/09    Sternoclavicular joint strain 10/31/2016    [facility] PF note  [facility] f/u [facility] 12/27/16 f/u    Toe pain 04/04/2017    [facility] PF note onychomycosis    Vitamin D deficiency      Past Surgical History:   Procedure Laterality Date    ENDOSCOPY, COLON, DIAGNOSTIC      dec 09 sta[facility]    HX BREAST BIOPSY Right 7/12/16    [facility] Imaging Radiologist    HX CARPAL TUNNEL RELEASE  10/2011     Allergies   Allergen Reactions    Trazodone Other (comments)     Hallucinations       REVIEW OF SYSTEMS:  General: She denies any chills, fever, weight loss or gain, appetite or sleeping habits. ENT: She denies any acute headaches, nasal congestion, nosebleeds, or tinnitus  Eyes: Previously blurred vision due to hyperglycemic state, but has returned to normal now.   Neck: No stiffness or swollen nodes  Respiratory: Negative for - acute cough, hemoptysis, shortness of breath, or wheezing  Cardiovascular : Negative for - acute chest pain, orthopnea, edema, palpitations, or shortness of breath  Gastrointestinal: Negative for - acute abdominal pain, blood in stools, tarry stools, heartburn, or nausea/vomiting  Neurological: Negative for - TIA or stroke symptoms  Hematologic: Denies unexplained bruises or bleeding  Lymphatic: Negative for swollen glands/nodes  Integumentary: Denies any new rash or unexplained bruising  Endocrine: Denies malaise/lethargy, hot/cold intolerance, polyuria/polydipsia, or unexpected weight changes. Social History     Socioeconomic History    Marital status:      Spouse name: Not on file    Number of children: Not on file    Years of education: Not on file    Highest education level: Not on file   Tobacco Use    Smoking status: Never Smoker    Smokeless tobacco: Never Used   Substance and Sexual Activity    Alcohol use: No    Drug use: No    Sexual activity: Yes     Partners: Male     Family History   Problem Relation Age of Onset    Hypertension Mother     Diabetes Mother     Heart Disease Mother    Laura Alton Bladder Disease Father     Hypertension Father     Heart Disease Brother         mi at age [age] [address] [place], while playing racketball       OBJECTIVE:     Visit Vitals  /82 (BP 1 Location: Left upper arm, BP Patient Position: Sitting, BP Cuff Size: Large adult)   Pulse 84   Temp 97 °F (36.1 °C)   Resp 16   Ht 5' 7\" (1.702 m)   Wt 150 lb 3.2 oz (68.1 kg)   LMP 05/31/2010   SpO2 100%   BMI 23.52 kg/m²       Constitutional: She appears well nourished, of stated age, and dressed appropriately. Eyes: Sclera anicteric, PERRLA, EOMI  Neck: Supple without lymphadenopathy. Respiratory: Clear to ascultation X5, normal inspiratory effort, no adventitious breath sounds. Cardiovascular: Regular rate and rhythm, no murmurs, rubs or gallops, PMI not displaced, No thrills, no peripheral edema  Lymphatic: No lymph node enlargemant. Integumentary: No unusual rashes or suspicious skin lesions noted.  Nails appear normal.  Neuro: Non-focal exam, A & O X 3.  Station and gait normal.  Psychiatric: Appropriate affect and demeanor, pleasant and cooperative. Patient's thought content and thought processing appear to be within normal limits. ASSESSMENT/PLAN:       ICD-10-CM ICD-9-CM    1. Uncontrolled type 2 diabetes mellitus with hyperglycemia, without long-term current use of insulin (HCC)  E11.65 250.02 CBC WITH AUTOMATED DIFF      LIPID PANEL      METABOLIC PANEL, COMPREHENSIVE      HEMOGLOBIN A1C W/O EAG      URINE, MICROALBUMIN, SEMIQUANTITATIVE      enalapril (VASOTEC) 10 mg tablet      metFORMIN (GLUCOPHAGE) 1,000 mg tablet      dulaglutide (Trulicity) 7.25 TV/1.1 mL sub-q pen      glipiZIDE (GLUCOTROL) 5 mg tablet   2. Essential hypertension  I10 401.9 CBC WITH AUTOMATED DIFF      LIPID PANEL      METABOLIC PANEL, COMPREHENSIVE      TSH 3RD GENERATION      URINALYSIS W/MICROSCOPIC      URINE, MICROALBUMIN, SEMIQUANTITATIVE      enalapril (VASOTEC) 10 mg tablet      triamterene-hydroCHLOROthiazide (MAXZIDE) 37.5-25 mg per tablet     1: Labs ordered today including: CBC, CMP, lipid panel, TSH, A1c, urinalysis, and microalbumin. 2: We will change Metformin to 1000 mg twice a day. Discontinue glimepiride, and start Glucotrol 5 mg twice daily. Continue Jardiance 25 mg daily. Continue Trulicity 6.24 mg subcutaneously weekly. 3: Patient to continue healthy lifestyle management including regular exercise, avoidance of offending foods, medication compliance, and healthy fiber in diet. 4: Patient to follow-up with me in approximately 2 weeks, or sooner as needed. Patient states understanding and agrees with plan.     Orders Placed This Encounter    CBC WITH AUTOMATED DIFF ([facility])    LIPID PANEL ([facility])    METABOLIC PANEL, COMPREHENSIVE ([facility])    TSH 3RD GENERATION ([facility])    HEMOGLOBIN A1C W/O EAG ([facility])    URINALYSIS W/MICROSCOPIC ([facility])    URINE, MICROALBUMIN, SEMIQUANTITATIVE ([facility])    enalapril (VASOTEC) 10 mg tablet    triamterene-hydroCHLOROthiazide (MAXZIDE) 37.5-25 mg per tablet    metFORMIN (GLUCOPHAGE) 1,000 mg tablet    dulaglutide (Trulicity) 1.59 PZ/1.0 mL sub-q pen    glipiZIDE (GLUCOTROL) 5 mg tablet         ATTENTION:   This medical record was transcribed using an electronic medical records system. Although proofread, it may and can contain electronic and spelling errors. Other human spelling and other errors may be present. Corrections may be executed at a later time. Please feel free to contact us for any clarifications as needed. Follow-up and Dispositions    · Return in about 2 weeks (around 2/17/2021) for Follow up diabetes/new medications. Anastasiya Pyle, MSN APRN FNP-BC    The patient verbalized understanding of the problems and plans as explained.

## 2021-02-04 RX ORDER — PRAVASTATIN SODIUM 10 MG/1
10 TABLET ORAL
Qty: 90 TAB | Refills: 1 | Status: SHIPPED | OUTPATIENT
Start: 2021-02-04 | End: 2021-05-06

## 2021-02-04 NOTE — PROGRESS NOTES
Thyroid appears normal at this time. Cholesterol levels are above normal.  To reduce long-term risk secondary to diabetes and elevated cholesterol, I am going to start you on a medication to help with this. You can pick it up at the pharmacy. Kidney function and liver functions are okay. Electrolytes okay. No signs of anemia. Hemoglobin A1c is excessively high now at 12.2%. Goal is less than 7%. Use medication as directed and avoid concentrated sweets. Recheck labs in 3 months.

## 2021-02-19 ENCOUNTER — OFFICE VISIT (OUTPATIENT)
Dept: INTERNAL MEDICINE CLINIC | Age: 61
End: 2021-02-19
Payer: COMMERCIAL

## 2021-02-19 VITALS
DIASTOLIC BLOOD PRESSURE: 78 MMHG | RESPIRATION RATE: 16 BRPM | HEIGHT: 67 IN | OXYGEN SATURATION: 99 % | BODY MASS INDEX: 23.17 KG/M2 | TEMPERATURE: 98 F | HEART RATE: 77 BPM | SYSTOLIC BLOOD PRESSURE: 118 MMHG | WEIGHT: 147.6 LBS

## 2021-02-19 DIAGNOSIS — I10 ESSENTIAL HYPERTENSION: ICD-10-CM

## 2021-02-19 DIAGNOSIS — E11.65 UNCONTROLLED TYPE 2 DIABETES MELLITUS WITH HYPERGLYCEMIA (HCC): ICD-10-CM

## 2021-02-19 DIAGNOSIS — E11.65 UNCONTROLLED TYPE 2 DIABETES MELLITUS WITH HYPERGLYCEMIA, WITHOUT LONG-TERM CURRENT USE OF INSULIN (HCC): Primary | ICD-10-CM

## 2021-02-19 DIAGNOSIS — M76.52 PATELLAR TENDINITIS OF LEFT KNEE: ICD-10-CM

## 2021-02-19 PROCEDURE — 99213 OFFICE O/P EST LOW 20 MIN: CPT | Performed by: NURSE PRACTITIONER

## 2021-02-19 RX ORDER — PEN NEEDLE, DIABETIC 30 GX3/16"
NEEDLE, DISPOSABLE MISCELLANEOUS DAILY
Qty: 50 PEN NEEDLE | Refills: 5 | Status: SHIPPED | OUTPATIENT
Start: 2021-02-19 | End: 2021-12-02

## 2021-02-19 NOTE — PROGRESS NOTES
Laith White is a 56 y.o. female    Chief Complaint   Patient presents with    Follow-up     2 wk follow up       Visit Vitals  /78 (BP 1 Location: Left upper arm, BP Patient Position: Sitting, BP Cuff Size: Small adult)   Pulse 77   Temp 98 °F (36.7 °C)   Resp 16   Ht 5' 7\" (1.702 m)   Wt 147 lb 9.6 oz (67 kg)   LMP 05/31/2010   SpO2 99%   BMI 23.12 kg/m²           1. Have you been to the ER, urgent care clinic since your last visit? Hospitalized since your last visit? No     2. Have you seen or consulted any other health care providers outside of the [address] [place] [address] since your last visit? Include any pap smears or colon screening.  No

## 2021-02-19 NOTE — PROGRESS NOTES
Chief Complaint   Patient presents with    Follow-up     2 wk follow up       SUBJECTIVE:    Nancy Johnson is a 56 y.o. female who is here today for a follow up appointment regarding diabetes, and complaints of left knee pain. The patient has most recently added glipizide 5 mg twice a day to her daily regimen. She has also been checking her blood sugars on a regular basis. She also continues to take her Metformin and Jardiance daily. She was also prescribed Trulicity at our last encounter, but states her insurance would not cover it. She is wondering if there is an alternative. She has brought in her blood sugar diary today for my perusal.    In addition, she complains of pain to her left knee that started approximately 1 month ago after falling down at work. She states she hit bluntly on a cement floor, but was able to ambulate thereafter with minimal difficulty. However, since that time, she has noticed a \"knot\" at the base of her left patella. She states it is more noticeable with direct pressure, but does not bother her as much with ambulation. She denies any redness, bruising, rash, or severe pain. She simply notices it more when she applies direct pressure to the area. Current Outpatient Medications   Medication Sig Dispense Refill    liraglutide (VICTOZA) 0.6 mg/0.1 mL (18 mg/3 mL) pnij Inject 0.6 mg daily to abdomen. 1 Adjustable Dose Pre-filled Pen Syringe 2    Insulin Needles, Disposable, 31 gauge x 5/16\" ndle by SubCUTAneous route daily. Use daily with Victoza pen. 50 Pen Needle 5    empagliflozin (Jardiance) 25 mg tablet TAKE 1 TABLET BY MOUTH EVERY DAY 90 Tab 0    pravastatin (PRAVACHOL) 10 mg tablet Take 1 Tab by mouth nightly. Indications: excessive fat in the blood 90 Tab 1    enalapril (VASOTEC) 10 mg tablet Take 1 Tab by mouth daily. 30 Tab 5    triamterene-hydroCHLOROthiazide (MAXZIDE) 37.5-25 mg per tablet Take 1 Tab by mouth daily.  Indications: high blood pressure 30 Tab 5    metFORMIN (GLUCOPHAGE) 1,000 mg tablet Take 1 Tab by mouth two (2) times daily (with meals). Indications: type 2 diabetes mellitus 60 Tab 5    Lactobac no.41/Bifidobact no.7 (PROBIOTIC-10 PO) Take  by mouth.  diphenhydrAMINE (BENADRYL) 25 mg tablet Take 50 mg by mouth every six (6) hours as needed.  Cholecalciferol, Vitamin D3, (VITAMIN D3) 1,000 unit cap Take 1,000 Units by mouth daily.  fluticasone (FLONASE) 50 mcg/actuation nasal spray 2 sprays per nostril daily (Patient taking differently: as needed. 2 sprays per nostril daily) 1 Bottle 0    OTHER Glucometer test strips-test 2-3 times per week 90 Strip 3    glipiZIDE (GLUCOTROL) 5 mg tablet Take 1 Tab by mouth two (2) times a day.  60 Tab 5    traZODone (DESYREL) 50 mg tablet TAKE 1 TABLET BY MOUTH EVERY DAY AT NIGHT 30 Tab 0    raNITIdine (ZANTAC) 300 mg tab TAKE 1 TABLET BY MOUTH TWICE A DAY  3     Past Medical History:   Diagnosis Date    Abnormal mammogram of right breast 7/1/16    [facility] -calcifications--bx planned    Advance directive discussed with patient 12/08/2015,06/22/2016    Diabetes [facility])     Encounter for surgical counseling     7/28/16 note from Dr Eliseo Mackenzie H/O breast biopsy 7/12/16     atypical ductal hyperplasia & calcifications    Herpes zoster 1/3/16    [facility] notes area on right shoulder    Hypertension     Pancreas inflammation 1/09    Sternoclavicular joint strain 10/31/2016    [facility] note  [facility] Grayce Alamin 12/27/16 f/u    Toe pain 04/04/2017    [facility] note onychomycosis    Vitamin D deficiency      Past Surgical History:   Procedure Laterality Date    ENDOSCOPY, COLON, DIAGNOSTIC      dec 09 [facility]    HX BREAST BIOPSY Right 7/12/16    [facility] Imaging Radiologist    HX CARPAL TUNNEL RELEASE  10/2011     Allergies   Allergen Reactions    Trazodone Other (comments)     Hallucinations       REVIEW OF SYSTEMS:  General: She denies any chills, fever, weight loss or gain, appetite or sleeping habits. Neck: No stiffness or swollen nodes  Respiratory: Negative for - acute cough, hemoptysis, shortness of breath, or wheezing  Cardiovascular : Negative for - acute chest pain, orthopnea, edema, palpitations, or shortness of breath  Gastrointestinal: Negative for acute abdominal pain, blood in stools, tarry stools, heartburn, or nausea/vomiting  Musculoskeletal: See HPI. Denies difficulty with ambulation. Neurological: Negative for - TIA or stroke symptoms  Hematologic: Denies unexplained bruises or bleeding  Lymphatic: Negative for swollen glands/nodes  Integumentary: Denies any new rash or unexplained bruising  Endocrine: Denies malaise/lethargy, hot/cold intolerance, polyuria/polydipsia, or unexpected weight changes. Social History     Socioeconomic History    Marital status:      Spouse name: Not on file    Number of children: Not on file    Years of education: Not on file    Highest education level: Not on file   Tobacco Use    Smoking status: Never Smoker    Smokeless tobacco: Never Used   Substance and Sexual Activity    Alcohol use: No    Drug use: No    Sexual activity: Yes     Partners: Male     Family History   Problem Relation Age of Onset    Hypertension Mother     Diabetes Mother     Heart Disease Mother    Caren Jonesney Bladder Disease Father     Hypertension Father     Heart Disease Brother         mi at age 27, while playing racketball       OBJECTIVE:     Visit Vitals  /78 (BP 1 Location: Left upper arm, BP Patient Position: Sitting, BP Cuff Size: Small adult)   Pulse 77   Temp 98 °F (36.7 °C)   Resp 16   Ht 5' 7\" (1.702 m)   Wt 147 lb 9.6 oz (67 kg)   LMP 05/31/2010   SpO2 99%   BMI 23.12 kg/m²       Constitutional: She appears well nourished, of stated age, and dressed appropriately. Eyes: Sclera anicteric, PERRLA, EOMI  Neck: Supple without lymphadenopathy.    Respiratory: Clear to ascultation X5, normal inspiratory effort, no adventitious breath sounds. Cardiovascular: Regular rate and rhythm, no murmurs, rubs or gallops, PMI not displaced, No thrills, no peripheral edema  Hematologic: No purpura, petechiae or unexplained bruising  Lymphatic: No lymph node enlargemant. Musculoskeletal: Mild protrusion to distal/inferior aspect of patella at tendon insertion. Mildly tender on palpation. Rubbery appearance on palpation. Integumentary: No unusual rashes or suspicious bruising. Neuro: Non-focal exam, A & O X 3.  Station and gait normal.    Psychiatric: Appropriate affect and demeanor, pleasant and cooperative. Patient's thought content and thought processing appear to be within normal limits. ASSESSMENT/PLAN:       ICD-10-CM ICD-9-CM    1. Uncontrolled type 2 diabetes mellitus with hyperglycemia, without long-term current use of insulin ([facility])  E11.65 250.02 liraglutide (VICTOZA) 0.6 mg/0.1 mL (18 mg/3 mL) pnij      Insulin Needles, Disposable, 31 gauge x 5/16\" ndle      empagliflozin (Jardiance) 25 mg tablet   2. Essential hypertension  I10 401.9    3. Patellar tendinitis of left knee  M76.52 726.64    4. Uncontrolled type 2 diabetes mellitus with hyperglycemia ([facility])  E11.65 250.02 empagliflozin (Jardiance) 25 mg tablet     1: I will start patient on Victoza 0.6 mg subcutaneous daily. 2: Patient to continue all other medications as directed. Humphries Otley will be refilled as requested. 3: Patient to apply slip on knee elastic bandage to area to provide pressure. Avoid overuse, and direct pressure to the area. 4: Patient to follow-up with me in approximately 1 month, or sooner as needed. Patient states understanding and agrees with plan. Orders Placed This Encounter    liraglutide (VICTOZA) 0.6 mg/0.1 mL (18 mg/3 mL) pnij    Insulin Needles, Disposable, 31 gauge x 5/16\" ndle    empagliflozin (Jardiance) 25 mg tablet         ATTENTION:   This medical record was transcribed using an electronic medical records system.   Although proofread, it may and can contain electronic and spelling errors. Other human spelling and other errors may be present. Corrections may be executed at a later time. Please feel free to contact us for any clarifications as needed. Follow-up and Dispositions    · Return in about 4 weeks (around 3/19/2021) for Follow up. No results found for any visits on 02/19/21. Signed,  Sultana Renner. Harry Hunt, MSN APRN FNP-BC    The patient verbalized understanding of the problems and plans as explained.

## 2021-03-19 ENCOUNTER — OFFICE VISIT (OUTPATIENT)
Dept: INTERNAL MEDICINE CLINIC | Age: 61
End: 2021-03-19
Payer: COMMERCIAL

## 2021-03-19 VITALS
TEMPERATURE: 97.7 F | WEIGHT: 146.6 LBS | SYSTOLIC BLOOD PRESSURE: 116 MMHG | DIASTOLIC BLOOD PRESSURE: 76 MMHG | OXYGEN SATURATION: 100 % | HEART RATE: 72 BPM | HEIGHT: 67 IN | BODY MASS INDEX: 23.01 KG/M2 | RESPIRATION RATE: 16 BRPM

## 2021-03-19 DIAGNOSIS — E11.21 TYPE 2 DIABETES WITH NEPHROPATHY (HCC): Primary | ICD-10-CM

## 2021-03-19 DIAGNOSIS — E11.65 UNCONTROLLED TYPE 2 DIABETES MELLITUS WITH HYPERGLYCEMIA, WITHOUT LONG-TERM CURRENT USE OF INSULIN (HCC): ICD-10-CM

## 2021-03-19 DIAGNOSIS — E78.00 HYPERCHOLESTEROLEMIA: ICD-10-CM

## 2021-03-19 DIAGNOSIS — I10 ESSENTIAL HYPERTENSION: ICD-10-CM

## 2021-03-19 PROCEDURE — 99213 OFFICE O/P EST LOW 20 MIN: CPT | Performed by: NURSE PRACTITIONER

## 2021-03-19 RX ORDER — METFORMIN HYDROCHLORIDE 500 MG/1
TABLET ORAL
COMMUNITY
Start: 2021-03-15 | End: 2021-08-02 | Stop reason: ALTCHOICE

## 2021-03-19 NOTE — PROGRESS NOTES
Chief Complaint   Patient presents with    Blood Pressure Check     4 wk follow up    Diabetes       SUBJECTIVE:    Evelyn Parada is a 56 y.o. female who is here today for a follow up appointment regarding her diabetes, hyperlipidemia, and hypertension. She is currently being managed for hypertension, and is compliant with current medications as listed. She denies any adverse side effects of medication(s) at this time, and states She is tolerating them well. The patient denies any symptoms of chest pain, shortness of breath, dizziness, orthostasis, or palpitations. She is occasionally measuring blood pressure. On last labs, the patient was found to have a hemoglobin A1c of approximately 12.2%. She has since made dietary adjustments and improve compliance with medication. She states she is checking her blood sugars on a regular basis, and rarely finds them above 150 mg/dL. She is tolerating medication well, denies any hypoglycemic episodes. She is currently being managed for hyperlipidemia, and is compliant with current medication(s) as listed. She denies any adverse side effects of medication(s) at this time, and states She is tolerating them well. The patient denies any symptoms of muscle or joint pain, weakness, change in bowel patterns, abdominal pain, or headaches. She is fairly compliant with a low fat/low cholesterol diet at this time, and is not presently exercising on a regular basis. Current Outpatient Medications   Medication Sig Dispense Refill    liraglutide (VICTOZA) 0.6 mg/0.1 mL (18 mg/3 mL) pnij Inject 0.6 mg daily to abdomen. 1 Adjustable Dose Pre-filled Pen Syringe 2    Insulin Needles, Disposable, 31 gauge x 5/16\" ndle by SubCUTAneous route daily. Use daily with Victoza pen. 50 Pen Needle 5    empagliflozin (Jardiance) 25 mg tablet TAKE 1 TABLET BY MOUTH EVERY DAY 90 Tab 0    pravastatin (PRAVACHOL) 10 mg tablet Take 1 Tab by mouth nightly.  Indications: excessive fat in the blood 90 Tab 1    enalapril (VASOTEC) 10 mg tablet Take 1 Tab by mouth daily. 30 Tab 5    triamterene-hydroCHLOROthiazide (MAXZIDE) 37.5-25 mg per tablet Take 1 Tab by mouth daily. Indications: high blood pressure 30 Tab 5    metFORMIN (GLUCOPHAGE) 1,000 mg tablet Take 1 Tab by mouth two (2) times daily (with meals). Indications: type 2 diabetes mellitus 60 Tab 5    glipiZIDE (GLUCOTROL) 5 mg tablet Take 1 Tab by mouth two (2) times a day. 60 Tab 5    Lactobac no.41/Bifidobact no.7 (PROBIOTIC-10 PO) Take  by mouth.  diphenhydrAMINE (BENADRYL) 25 mg tablet Take 50 mg by mouth every six (6) hours as needed.  Cholecalciferol, Vitamin D3, (VITAMIN D3) 1,000 unit cap Take 1,000 Units by mouth daily.  fluticasone (FLONASE) 50 mcg/actuation nasal spray 2 sprays per nostril daily (Patient taking differently: as needed.  2 sprays per nostril daily) 1 Bottle 0    OTHER Glucometer test strips-test 2-3 times per week 90 Strip 3    metFORMIN (GLUCOPHAGE) 500 mg tablet        Past Medical History:   Diagnosis Date    Abnormal mammogram of right breast 7/1/16    [facility] -calcifications--bx planned    Advance directive discussed with patient 12/08/2015,06/22/2016    Diabetes [facility])     Encounter for surgical counseling     7/28/16 note from Dr Chaz Espinosa H/O breast biopsy 7/12/16     atypical ductal hyperplasia & calcifications    Herpes zoster 1/3/16    [facility] Baez PF notes area on right shoulder    Hypertension     Pancreas inflammation 1/09    Sternoclavicular joint strain 10/31/2016    [facility] note  [facility] Samuella Kroner 12/27/16 f/u    Toe pain 04/04/2017    [facility]ier[facility] Baez PF note onychomycosis    Vitamin D deficiency      Past Surgical History:   Procedure Laterality Date    ENDOSCOPY, COLON, DIAGNOSTIC      dec 09 [facility]    HX BREAST BIOPSY Right 7/12/16    [facility] Imaging Radiologist    HX CARPAL TUNNEL RELEASE  10/2011     Allergies   Allergen Reactions    Trazodone Other (comments) Hallucinations       REVIEW OF SYSTEMS:  General: She denies any chills, fever, weight loss or gain, acute changes in appetite or sleeping habits. Eyes: No blurred or visual changes  Neck: No stiffness or swollen nodes  Respiratory: Negative for - acute cough, hemoptysis, shortness of breath, or wheezing  Cardiovascular : Negative for - acute chest pain, orthopnea, edema, palpitations, or shortness of breath  Gastrointestinal: Negative for - acute abdominal pain, blood in stools, tarry stools, heartburn, or nausea/vomiting  Hematologic: Denies unexplained bruises or bleeding  Lymphatic: Negative for swollen glands/nodes  Integumentary: Denies any new rash or unexplained bruising  Endocrine: Denies malaise/lethargy, hot/cold intolerance, polyuria/polydipsia, or unexpected weight changes. Psychiatric: Denies any acute depression, anxiety, or psychosis. Social History     Socioeconomic History    Marital status:      Spouse name: Not on file    Number of children: Not on file    Years of education: Not on file    Highest education level: Not on file   Tobacco Use    Smoking status: Never Smoker    Smokeless tobacco: Never Used   Substance and Sexual Activity    Alcohol use: No    Drug use: No    Sexual activity: Yes     Partners: Male     Family History   Problem Relation Age of Onset    Hypertension Mother     Diabetes Mother     Heart Disease Mother    Sarah Cruz Bladder Disease Father     Hypertension Father     Heart Disease Brother         mi at age 27, while playing racketball       OBJECTIVE:     Visit Vitals  /76 (BP 1 Location: Left upper arm, BP Patient Position: Sitting, BP Cuff Size: Small adult)   Pulse 72   Temp 97.7 °F (36.5 °C)   Resp 16   Ht 5' 7\" (1.702 m)   Wt 146 lb 9.6 oz (66.5 kg)   LMP 05/31/2010   SpO2 100%   BMI 22.96 kg/m²       Constitutional: She appears well nourished, of stated age, and dressed appropriately.   Eyes: Sclera anicteric, PERRLA, EOMI  Neck: Supple without lymphadenopathy. Respiratory: Clear to ascultation X5, normal inspiratory effort, no adventitious breath sounds. Cardiovascular: Regular rate and rhythm, no murmurs, rubs or gallops, PMI not displaced, No thrills, no peripheral edema  Hematologic: No purpura, petechiae or unexplained bruising  Lymphatic: No lymph node enlargemant. Psychiatric: Appropriate affect and demeanor, pleasant and cooperative. ASSESSMENT/PLAN:       ICD-10-CM ICD-9-CM    1. Type 2 diabetes with nephropathy (HCC)  E11.21 250.40      583.81    2. Uncontrolled type 2 diabetes mellitus with hyperglycemia, without long-term current use of insulin (HCC)  E11.65 250.02    3. Essential hypertension  I10 401.9    4. Hypercholesterolemia  E78.00 272.0      1: Patient to continue current medications as directed. 2: Patient to monitor blood glucose at least twice daily before meals, and record results. Bring to next appointment as discussed today. 3: Patient to continue to monitor blood pressure on a regular basis. 4: Patient to follow-up with me in approximately 1 month, or sooner as needed. Patient states understanding and agrees with plan. Orders Placed This Encounter    metFORMIN (GLUCOPHAGE) 500 mg tablet         ATTENTION:   This medical record was transcribed using an electronic medical records system. Although proofread, it may and can contain electronic and spelling errors. Other human spelling and other errors may be present. Corrections may be executed at a later time. Please feel free to contact us for any clarifications as needed. Follow-up and Dispositions    · Return in about 4 weeks (around 4/16/2021) for Follow up. Signed,  Tiffany Alvarado.  Janet Olson, MSN APRN FNP-BC

## 2021-03-19 NOTE — PROGRESS NOTES
Nancy Johnson is a 56 y.o. female    Chief Complaint   Patient presents with    Blood Pressure Check     4 wk follow up    Diabetes       Visit Vitals  /76 (BP 1 Location: Left upper arm, BP Patient Position: Sitting, BP Cuff Size: Small adult)   Pulse 72   Temp 97.7 °F (36.5 °C)   Resp 16   Ht 5' 7\" (1.702 m)   Wt 146 lb 9.6 oz (66.5 kg)   LMP 05/31/2010   SpO2 100%   BMI 22.96 kg/m²           1. Have you been to the ER, urgent care clinic since your last visit? Hospitalized since your last visit? No     2. Have you seen or consulted any other health care providers outside of the [address] [place] [address] since your last visit? Include any pap smears or colon screening.  No

## 2021-04-20 ENCOUNTER — OFFICE VISIT (OUTPATIENT)
Dept: INTERNAL MEDICINE CLINIC | Age: 61
End: 2021-04-20
Payer: COMMERCIAL

## 2021-04-20 VITALS
SYSTOLIC BLOOD PRESSURE: 124 MMHG | WEIGHT: 143 LBS | HEIGHT: 67 IN | DIASTOLIC BLOOD PRESSURE: 79 MMHG | OXYGEN SATURATION: 100 % | BODY MASS INDEX: 22.44 KG/M2 | HEART RATE: 86 BPM | RESPIRATION RATE: 16 BRPM | TEMPERATURE: 98 F

## 2021-04-20 DIAGNOSIS — E11.65 UNCONTROLLED TYPE 2 DIABETES MELLITUS WITH HYPERGLYCEMIA (HCC): Primary | ICD-10-CM

## 2021-04-20 PROCEDURE — 99213 OFFICE O/P EST LOW 20 MIN: CPT | Performed by: NURSE PRACTITIONER

## 2021-04-20 NOTE — PROGRESS NOTES
Chief Complaint   Patient presents with    Diabetes     4 wk follow up    Blood Pressure Check       SUBJECTIVE:    Emilee Frost is a 56 y.o. female who is here today for a follow up appointment regarding her type 2 diabetes. Since our last encounter, the patient has been quite compliant and routine checks of her blood sugars both in the morning and evening. She has brought the results in for my perusal today. Her average blood sugars remain between 100-140 mg/dl. There are a few outliers, but nothing below 90, and nothing above 200. She states she feels very good about this, and believes that she is making great improvements in her blood sugar readings overall. As a result, she states she is feeling better overall as well. She states she is trying to be more compliant with her diet, and avoids eating offending foods most of the time. She is taking the medications as prescribed, and denies any significant adverse side effects, but states that she has lost a bit more weight since all of her diet changes. Current Outpatient Medications   Medication Sig Dispense Refill    liraglutide (VICTOZA) 0.6 mg/0.1 mL (18 mg/3 mL) pnij Inject 0.6 mg daily to abdomen. 1 Adjustable Dose Pre-filled Pen Syringe 2    Insulin Needles, Disposable, 31 gauge x 5/16\" ndle by SubCUTAneous route daily. Use daily with Victoza pen. 50 Pen Needle 5    empagliflozin (Jardiance) 25 mg tablet TAKE 1 TABLET BY MOUTH EVERY DAY 90 Tab 0    pravastatin (PRAVACHOL) 10 mg tablet Take 1 Tab by mouth nightly. Indications: excessive fat in the blood 90 Tab 1    enalapril (VASOTEC) 10 mg tablet Take 1 Tab by mouth daily. 30 Tab 5    triamterene-hydroCHLOROthiazide (MAXZIDE) 37.5-25 mg per tablet Take 1 Tab by mouth daily. Indications: high blood pressure 30 Tab 5    metFORMIN (GLUCOPHAGE) 1,000 mg tablet Take 1 Tab by mouth two (2) times daily (with meals).  Indications: type 2 diabetes mellitus 60 Tab 5    glipiZIDE (GLUCOTROL) 5 mg tablet Take 1 Tab by mouth two (2) times a day. 60 Tab 5    diphenhydrAMINE (BENADRYL) 25 mg tablet Take 50 mg by mouth every six (6) hours as needed.  Cholecalciferol, Vitamin D3, (VITAMIN D3) 1,000 unit cap Take 1,000 Units by mouth daily.  fluticasone (FLONASE) 50 mcg/actuation nasal spray 2 sprays per nostril daily (Patient taking differently: as needed. 2 sprays per nostril daily) 1 Bottle 0    OTHER Glucometer test strips-test 2-3 times per week 90 Strip 3    metFORMIN (GLUCOPHAGE) 500 mg tablet       Lactobac no.41/Bifidobact no.7 (PROBIOTIC-10 PO) Take  by mouth. Past Medical History:   Diagnosis Date    Abnormal mammogram of right breast 7/1/16    [facility] -calcifications--bx planned    Advance directive discussed with patient 12/08/2015,06/22/2016    Diabetes [facility])     Encounter for surgical counseling     7/28/16 note from Dr Jose Truong H/O breast biopsy 7/12/16     atypical ductal hyperplasia & calcifications    Herpes zoster 1/3/16    [facility] notes area on right shoulder    Hypertension     Pancreas inflammation 1/09    Sternoclavicular joint strain 10/31/2016    [facility] note  [facility] Sharee Baba 12/27/16 f/u    Toe pain 04/04/2017    [facility] note onychomycosis    Vitamin D deficiency      Past Surgical History:   Procedure Laterality Date    ENDOSCOPY, COLON, DIAGNOSTIC      dec 09 staples mill    HX BREAST BIOPSY Right 7/12/16    [facility] Radiologist    HX CARPAL TUNNEL RELEASE  10/2011     Allergies   Allergen Reactions    Trazodone Other (comments)     Hallucinations       REVIEW OF SYSTEMS:  General: She denies any chills, fever, weight loss or gain, appetite or sleeping habits.   Eyes: No blurred or visual changes  Respiratory: Negative for - acute cough, hemoptysis, shortness of breath, or wheezing  Cardiovascular : Negative for - acute chest pain, orthopnea, edema, palpitations, or shortness of breath  Gastrointestinal: Denies any GI disturbance or nausea/vomiting. Lymphatic: Negative for swollen glands/nodes  Integumentary: Denies any new rash or unexplained bruising  Endocrine: Denies malaise/lethargy, hot/cold intolerance, polyuria/polydipsia, or unexpected weight changes. Social History     Socioeconomic History    Marital status:      Spouse name: Not on file    Number of children: Not on file    Years of education: Not on file    Highest education level: Not on file   Tobacco Use    Smoking status: Never Smoker    Smokeless tobacco: Never Used   Substance and Sexual Activity    Alcohol use: No    Drug use: No    Sexual activity: Yes     Partners: Male     Family History   Problem Relation Age of Onset    Hypertension Mother     Diabetes Mother     Heart Disease Mother    [place] Bladder Disease Father     Hypertension Father     Heart Disease Brother         mi at age 27, while playing racketball       OBJECTIVE:     Visit Vitals  /79 (BP 1 Location: Left upper arm, BP Patient Position: Sitting, BP Cuff Size: Small adult)   Pulse 86   Temp 98 °F (36.7 °C)   Resp 16   Ht 5' 7\" (1.702 m)   Wt 143 lb (64.9 kg)   LMP 05/31/2010   SpO2 100%   BMI 22.40 kg/m²       Constitutional: She appears well nourished, of stated age, and dressed appropriately. Eyes: Sclera anicteric, PERRLA, EOMI  Respiratory: Clear to ascultation X5, normal inspiratory effort, no adventitious breath sounds. Cardiovascular: Regular rate and rhythm, no murmurs, rubs or gallops, PMI not displaced, No thrills, no peripheral edema  Lymphatic: No lymph node enlargemant. Neuro: Non-focal exam, A & O X 3.   Psychiatric: Appropriate affect and demeanor, pleasant and cooperative. ASSESSMENT/PLAN:       ICD-10-CM ICD-9-CM    1. Uncontrolled type 2 diabetes mellitus with hyperglycemia (HCC)  E11.65 250.02      1: Patient to continue current medication for management of type 2 diabetes.   2: Continue to watch diet, and avoid offending foods.  3: Patient to reduce blood sugar checks to 2-3 times in the evening, and 3-4 times in the morning a week. 4: Patient to follow-up with me in approximately 1 month for reevaluation, and repeat hemoglobin A1c. Patient states understanding and agrees with plan. ATTENTION:   This medical record was transcribed using an electronic medical records system. Although proofread, it may and can contain electronic and spelling errors. Other human spelling and other errors may be present. Corrections may be executed at a later time. Please feel free to contact us for any clarifications as needed. Signed,  Rebeka Agosto.  Gary Fuller, MSN APRN FNP-BC

## 2021-04-20 NOTE — PROGRESS NOTES
Terrence Madrigal is a 56 y.o. female    Chief Complaint   Patient presents with    Diabetes     4 wk follow up    Blood Pressure Check       Visit Vitals  /79 (BP 1 Location: Left upper arm, BP Patient Position: Sitting, BP Cuff Size: Small adult)   Pulse 86   Temp 98 °F (36.7 °C)   Resp 16   Ht 5' 7\" (1.702 m)   Wt 143 lb (64.9 kg)   LMP 05/31/2010   SpO2 100%   BMI 22.40 kg/m²           1. Have you been to the ER, urgent care clinic since your last visit? Hospitalized since your last visit? No     2. Have you seen or consulted any other health care providers outside of the [address] [place] [address] since your last visit? Include any pap smears or colon screening.  No

## 2021-05-06 DIAGNOSIS — E78.5 HYPERLIPIDEMIA ASSOCIATED WITH TYPE 2 DIABETES MELLITUS (HCC): ICD-10-CM

## 2021-05-06 DIAGNOSIS — E11.69 HYPERLIPIDEMIA ASSOCIATED WITH TYPE 2 DIABETES MELLITUS (HCC): ICD-10-CM

## 2021-05-06 DIAGNOSIS — E11.65 UNCONTROLLED TYPE 2 DIABETES MELLITUS WITH HYPERGLYCEMIA, WITHOUT LONG-TERM CURRENT USE OF INSULIN (HCC): ICD-10-CM

## 2021-05-06 RX ORDER — PRAVASTATIN SODIUM 10 MG/1
TABLET ORAL
Qty: 90 TAB | Refills: 1 | Status: SHIPPED | OUTPATIENT
Start: 2021-05-06 | End: 2022-01-05

## 2021-05-18 ENCOUNTER — OFFICE VISIT (OUTPATIENT)
Dept: INTERNAL MEDICINE CLINIC | Age: 61
End: 2021-05-18
Payer: COMMERCIAL

## 2021-05-18 VITALS
SYSTOLIC BLOOD PRESSURE: 123 MMHG | HEART RATE: 86 BPM | WEIGHT: 142 LBS | RESPIRATION RATE: 18 BRPM | TEMPERATURE: 98.6 F | BODY MASS INDEX: 22.29 KG/M2 | DIASTOLIC BLOOD PRESSURE: 78 MMHG | HEIGHT: 67 IN

## 2021-05-18 DIAGNOSIS — E11.69 HYPERLIPIDEMIA ASSOCIATED WITH TYPE 2 DIABETES MELLITUS (HCC): ICD-10-CM

## 2021-05-18 DIAGNOSIS — I10 ESSENTIAL HYPERTENSION: ICD-10-CM

## 2021-05-18 DIAGNOSIS — E78.5 HYPERLIPIDEMIA ASSOCIATED WITH TYPE 2 DIABETES MELLITUS (HCC): ICD-10-CM

## 2021-05-18 DIAGNOSIS — E11.65 UNCONTROLLED TYPE 2 DIABETES MELLITUS WITH HYPERGLYCEMIA, WITHOUT LONG-TERM CURRENT USE OF INSULIN (HCC): Primary | ICD-10-CM

## 2021-05-18 DIAGNOSIS — M54.32 SCIATICA OF LEFT SIDE: ICD-10-CM

## 2021-05-18 PROCEDURE — 99214 OFFICE O/P EST MOD 30 MIN: CPT | Performed by: NURSE PRACTITIONER

## 2021-05-18 NOTE — PROGRESS NOTES
Chief Complaint   Patient presents with    Diabetes     4w f/u, non fasting labs. SUBJECTIVE:    Ruddy Boone is a 56 y.o. female who is here today for a follow up appointment regarding her type 2 diabetes, dyslipidemia, hypertension, and complaints of left leg pain off and on for the past few weeks. Patient is currently being managed for type 2 diabetes and is compliant with medications as stated. She is also been very compliant about her diet, and avoiding concentrated sweets. She checks her blood sugars regularly, and has brought the results in for my perusal today. The patient continues to have more stable blood sugars overall, and with very few in the upper 100s. She denies any adverse side effects of the medicine. She is currently being managed for hypertension, and is compliant with current medications as listed. She denies any adverse side effects of medication(s) at this time, and states She is tolerating them well. The patient denies any symptoms of chest pain, shortness of breath, dizziness, orthostasis, or palpitations. She is not measuring blood pressure on a regular basis. She is currently being managed for hyperlipidemia, and is compliant with current medication(s) as listed. She denies any adverse side effects of medication(s) at this time, and states She is tolerating them well. The patient denies any symptoms of muscle or joint pain, weakness, change in bowel patterns, abdominal pain, or headaches. She is trying to be compliant with a low fat/low cholesterol diet at this time, and is not purposefully exercising on a regular basis. In addition, the patient complains of some left leg pain that initially started in her ankle area, and has gradually moved up to her thigh and left lateral buttock. She states it is worse after prolonged seated positions or standing positions. She denies any loss of strength, but has occasional numbness that is in her left foot.  She has been using some icy hot on it which she feels \"is helping somewhat. \"    Current Outpatient Medications   Medication Sig Dispense Refill    pravastatin (PRAVACHOL) 10 mg tablet TAKE 1 TABLET BY MOUTH AT BEDTIME 90 Tab 1    liraglutide (VICTOZA) 0.6 mg/0.1 mL (18 mg/3 mL) pnij Inject 0.6 mg daily to abdomen. 1 Adjustable Dose Pre-filled Pen Syringe 2    Insulin Needles, Disposable, 31 gauge x 5/16\" ndle by SubCUTAneous route daily. Use daily with Victoza pen. 50 Pen Needle 5    empagliflozin (Jardiance) 25 mg tablet TAKE 1 TABLET BY MOUTH EVERY DAY 90 Tab 0    enalapril (VASOTEC) 10 mg tablet Take 1 Tab by mouth daily. 30 Tab 5    triamterene-hydroCHLOROthiazide (MAXZIDE) 37.5-25 mg per tablet Take 1 Tab by mouth daily. Indications: high blood pressure 30 Tab 5    metFORMIN (GLUCOPHAGE) 1,000 mg tablet Take 1 Tab by mouth two (2) times daily (with meals). Indications: type 2 diabetes mellitus 60 Tab 5    glipiZIDE (GLUCOTROL) 5 mg tablet Take 1 Tab by mouth two (2) times a day. 60 Tab 5    Lactobac no.41/Bifidobact no.7 (PROBIOTIC-10 PO) Take  by mouth.  diphenhydrAMINE (BENADRYL) 25 mg tablet Take 50 mg by mouth every six (6) hours as needed.  Cholecalciferol, Vitamin D3, (VITAMIN D3) 1,000 unit cap Take 1,000 Units by mouth daily.  fluticasone (FLONASE) 50 mcg/actuation nasal spray 2 sprays per nostril daily (Patient taking differently: as needed.  2 sprays per nostril daily) 1 Bottle 0    OTHER Glucometer test strips-test 2-3 times per week 90 Strip 3    metFORMIN (GLUCOPHAGE) 500 mg tablet        Past Medical History:   Diagnosis Date    Abnormal mammogram of right breast 7/1/16    [facility] -calcifications--bx planned    Advance directive discussed with patient 12/08/2015,06/22/2016    Diabetes [facility])     Encounter for surgical counseling     7/28/16 note from Dr Crow Dunn H/O breast biopsy 7/12/16     atypical ductal hyperplasia & calcifications    Herpes zoster 1/3/16    [facility] notes area on right shoulder    Hypertension     Pancreas inflammation 1/09    Sternoclavicular joint strain 10/31/2016    [facility] note  [facility] Celina Joanie 12/27/16 f/u    Toe pain 04/04/2017    [facility] note onychomycosis    Vitamin D deficiency      Past Surgical History:   Procedure Laterality Date    ENDOSCOPY, COLON, DIAGNOSTIC      dec 09 staples mill    HX BREAST BIOPSY Right 7/12/16    [facility] Radiologist    HX CARPAL TUNNEL RELEASE  10/2011     Allergies   Allergen Reactions    Trazodone Other (comments)     Hallucinations       REVIEW OF SYSTEMS:                                        POSITIVE= bold text  Negative = regular text    General:                     fever, chills, sweats, generalized weakness, weight loss/gain,                                       loss of appetite   Eyes:                           blurred vision, eye pain, loss of vision, double vision  ENT:                            rhinorrhea, pharyngitis   Respiratory:               cough, sputum production, SOB, REGALADO, wheezing, pleuritic pain   Cardiology:                chest pain, palpitations, orthopnea, PND, edema, syncope   Gastrointestinal:       abdominal pain , N/V, diarrhea, dysphagia, constipation, bleeding   Genitourinary:           frequency, urgency, dysuria, hematuria, incontinence   Muskuloskeletal :      arthralgia, myalgia, back pain  Hematology:              easy bruising, nose or gum bleeding, lymphadenopathy   Dermatological:         rash, ulceration, pruritis, color change / jaundice  Endocrine:                 hot flashes or polydipsia   Neurological:             headache, dizziness, confusion, focal weakness, paresthesia,                                      Speech difficulties, memory loss, gait difficulty  Psychological:          Feelings of anxiety, depression, agitation        Social History     Socioeconomic History    Marital status:      Spouse name: Not on file    Number of children: Not on file    Years of education: Not on file    Highest education level: Not on file   Tobacco Use    Smoking status: Never Smoker    Smokeless tobacco: Never Used   Substance and Sexual Activity    Alcohol use: No    Drug use: No    Sexual activity: Yes     Partners: Male     Family History   Problem Relation Age of Onset    Hypertension Mother     Diabetes Mother     Heart Disease Mother    [place] Bladder Disease Father     Hypertension Father     Heart Disease Brother         mi at age 27, while playing racketball       OBJECTIVE:     Visit Vitals  /78 (BP 1 Location: Right arm, BP Patient Position: Sitting, BP Cuff Size: Small adult)   Pulse 86   Temp 98.6 °F (37 °C) (Oral)   Resp 18   Ht 5' 7\" (1.702 m)   Wt 142 lb (64.4 kg)   LMP 05/31/2010   BMI 22.24 kg/m²       Constitutional: She appears well nourished, of stated age, and dressed appropriately. Eyes: Sclera anicteric, PERRLA, EOMI  ENT: Nares clear, moist mucous membranes, pharynx clear  Neck: Supple without lymphadenopathy. Thyroid normal, No JVD or bruits  Respiratory: Clear to ascultation X5, normal inspiratory effort, no adventitious breath sounds. Cardiovascular: Regular rate and rhythm, no murmurs, rubs or gallops, PMI not displaced, No thrills, no peripheral edema  Hematologic: No purpura, petechiae or unexplained bruising  Lymphatic: No lymph node enlargemant. Musculoskeletal: No joint pain or swelling on palpation. No AP/Lateral asymmetry noted. Integumentary: No unusual rashes or suspicious skin lesions noted. Nails appear normal.  Peripheral Vascular: Normal pulses palpable to PT/DP. Neuro: Non-focal exam, A & O X 3, DTRs equal and adequate. Psychiatric: Appropriate affect and demeanor, pleasant and cooperative. Patient's thought content and thought processing appear to be within normal limits. ASSESSMENT/PLAN:     ICD-10-CM ICD-9-CM    1.  Uncontrolled type 2 diabetes mellitus with hyperglycemia, without long-term current use of insulin ([facility])  E11.65 250.02 HEMOGLOBIN A1C WITH EAG   2. Sciatica of left side  M54.32 724.3    3. Hyperlipidemia associated with type 2 diabetes mellitus (HCC)  E11.69 250.80     E78.5 272.4    4. Essential hypertension  I10 401.9      1: We will do a repeat hemoglobin A1c today to evaluate patient's diabetes. 2: Handout given for exercises for sciatica. 3: Patient to continue all current medications for management of diabetes, cholesterol, and hypertension. 4: Patient to follow-up with me in approximately 3 months, or sooner as needed. Patient states understanding and agrees with plan. ATTENTION:   This medical record was transcribed using an electronic medical records system. Although proofread, it may and can contain electronic and spelling errors. Other human spelling and other errors may be present. Corrections may be executed at a later time. Please feel free to contact us for any clarifications as needed. Signed,  Blair Cheadle.  Marika Felix, MSN APRN FNP-BC

## 2021-05-18 NOTE — PATIENT INSTRUCTIONS
Sciatica: Exercises Introduction Here are some examples of typical rehabilitation exercises for your condition. Start each exercise slowly. Ease off the exercise if you start to have pain. Your doctor or physical therapist will tell you when you can start these exercises and which ones will work best for you. When you are not being active, find a comfortable position for rest. Some people are comfortable on the floor or a medium-firm bed with a small pillow under their head and another under their knees. Some people prefer to lie on their side with a pillow between their knees. Don't stay in one position for too long. Take short walks (10 to 20 minutes) every 2 to 3 hours. Avoid slopes, hills, and stairs until you feel better. Walk only distances you can manage without pain, especially leg pain. How to do the exercises Back stretches 1. Get down on your hands and knees on the floor. 2. Relax your head and allow it to droop. Round your back up toward the ceiling until you feel a nice stretch in your upper, middle, and lower back. Hold this stretch for as long as it feels comfortable, or about 15 to 30 seconds. 3. Return to the starting position with a flat back while you are on your hands and knees. 4. Let your back sway by pressing your stomach toward the floor. Lift your buttocks toward the ceiling. 5. Hold this position for 15 to 30 seconds. 6. Repeat 2 to 4 times. Follow-up care is a key part of your treatment and safety. Be sure to make and go to all appointments, and call your doctor if you are having problems. It's also a good idea to know your test results and keep a list of the medicines you take. Where can you learn more? Go to http://www.gray.com/ Enter C502 in the search box to learn more about \"Sciatica: Exercises. \" Current as of: November 16, 2020               Content Version: 12.8 © 5466-6262 [employer].   
Care instructions adapted under license by [employer] (which disclaims liability or warranty for this information). If you have questions about a medical condition or this instruction, always ask your healthcare professional. [address] any warranty or liability for your use of this information.

## 2021-05-18 NOTE — PROGRESS NOTES
Chief Complaint   Patient presents with    Diabetes     4w f/u, non fasting labs. 1. Have you been to the ER, urgent care clinic since your last visit? Hospitalized since your last visit? No    2. Have you seen or consulted any other health care providers outside of the [address] [place] [address] since your last visit? Include any pap smears or colon screening. No    Pt c/o left leg pain starts on left ankle radiates to left hip, mostly at nighttime x3 weeks. Pt received both Moderna Covid vaccines. Pt will bring card next visit.

## 2021-05-19 LAB
EST. AVERAGE GLUCOSE BLD GHB EST-MCNC: 160 MG/DL
HBA1C MFR BLD: 7.2 % (ref 4–5.6)

## 2021-05-19 NOTE — PROGRESS NOTES
Diabetes so much better control overall. Hemoglobin A1c has reduced from 12.2%, and now at 7.2%. This is really good! Our goal now, is to get you under 7.0%     Continue current medications, and just remain watchful of your diet to avoid any excess carbohydrates. We will recheck this in 6 months.

## 2021-06-10 DIAGNOSIS — E11.65 UNCONTROLLED TYPE 2 DIABETES MELLITUS WITH HYPERGLYCEMIA (HCC): ICD-10-CM

## 2021-06-10 DIAGNOSIS — E11.65 UNCONTROLLED TYPE 2 DIABETES MELLITUS WITH HYPERGLYCEMIA, WITHOUT LONG-TERM CURRENT USE OF INSULIN (HCC): ICD-10-CM

## 2021-07-31 DIAGNOSIS — I10 ESSENTIAL HYPERTENSION: ICD-10-CM

## 2021-07-31 DIAGNOSIS — E11.65 UNCONTROLLED TYPE 2 DIABETES MELLITUS WITH HYPERGLYCEMIA, WITHOUT LONG-TERM CURRENT USE OF INSULIN (HCC): ICD-10-CM

## 2021-08-02 RX ORDER — TRIAMTERENE/HYDROCHLOROTHIAZID 37.5-25 MG
TABLET ORAL
Qty: 90 TABLET | Refills: 1 | Status: SHIPPED | OUTPATIENT
Start: 2021-08-02 | End: 2022-01-28

## 2021-08-02 RX ORDER — GLIPIZIDE 5 MG/1
TABLET ORAL
Qty: 180 TABLET | Refills: 1 | Status: SHIPPED | OUTPATIENT
Start: 2021-08-02 | End: 2021-11-19 | Stop reason: DRUGHIGH

## 2021-08-02 RX ORDER — METFORMIN HYDROCHLORIDE 1000 MG/1
TABLET ORAL
Qty: 180 TABLET | Refills: 1 | Status: SHIPPED | OUTPATIENT
Start: 2021-08-02 | End: 2022-01-28

## 2021-08-02 RX ORDER — ENALAPRIL MALEATE 10 MG/1
TABLET ORAL
Qty: 90 TABLET | Refills: 1 | Status: SHIPPED | OUTPATIENT
Start: 2021-08-02 | End: 2022-01-28

## 2021-08-18 ENCOUNTER — OFFICE VISIT (OUTPATIENT)
Dept: INTERNAL MEDICINE CLINIC | Age: 61
End: 2021-08-18
Payer: COMMERCIAL

## 2021-08-18 VITALS
BODY MASS INDEX: 22.47 KG/M2 | HEIGHT: 67 IN | DIASTOLIC BLOOD PRESSURE: 76 MMHG | TEMPERATURE: 98.6 F | HEART RATE: 78 BPM | SYSTOLIC BLOOD PRESSURE: 113 MMHG | RESPIRATION RATE: 16 BRPM | WEIGHT: 143.2 LBS | OXYGEN SATURATION: 100 %

## 2021-08-18 DIAGNOSIS — I10 ESSENTIAL HYPERTENSION: ICD-10-CM

## 2021-08-18 DIAGNOSIS — E11.65 UNCONTROLLED TYPE 2 DIABETES MELLITUS WITH HYPERGLYCEMIA, WITHOUT LONG-TERM CURRENT USE OF INSULIN (HCC): Primary | ICD-10-CM

## 2021-08-18 DIAGNOSIS — E11.69 HYPERLIPIDEMIA ASSOCIATED WITH TYPE 2 DIABETES MELLITUS (HCC): ICD-10-CM

## 2021-08-18 DIAGNOSIS — E78.5 HYPERLIPIDEMIA ASSOCIATED WITH TYPE 2 DIABETES MELLITUS (HCC): ICD-10-CM

## 2021-08-18 PROCEDURE — 3051F HG A1C>EQUAL 7.0%<8.0%: CPT | Performed by: NURSE PRACTITIONER

## 2021-08-18 PROCEDURE — 99214 OFFICE O/P EST MOD 30 MIN: CPT | Performed by: NURSE PRACTITIONER

## 2021-08-18 NOTE — PROGRESS NOTES
Chief Complaint   Patient presents with    Diabetes     3 month follow up       SUBJECTIVE:    Marina Moulton is a 59 y.o. female who is here today for a follow up appointment regarding her diabetes, hypertension, and hyperlipidemia. The patient is currently taking her medication for management of her diabetes, and is checking her blood sugars every so often. She states she is tolerating the medication well, denies any adverse side effects. Equally she is watching her diet on a regular basis, and avoiding concentrated sweets whenever possible. Her last hemoglobin A1c was done approximately 3 months ago and found to be 7.2% which was a significant drop from her previous reading. The patient is currently taking medication for management of her hypertension. She denies any chest pain, chest pressure, palpitations, headaches, dizziness, blurred visions, or syncope episodes. She is also taking her pravastatin for management of her cholesterol, and is watchful of her diet. She denies any myalgias or joint pain. Current Outpatient Medications   Medication Sig Dispense Refill    glipiZIDE (GLUCOTROL) 5 mg tablet TAKE 1 TABLET BY MOUTH TWICE A  Tablet 1    enalapril (VASOTEC) 10 mg tablet TAKE 1 TABLET BY MOUTH EVERY DAY 90 Tablet 1    triamterene-hydroCHLOROthiazide (MAXZIDE) 37.5-25 mg per tablet TAKE 1 TABLET BY MOUTH EVERY DAY 90 Tablet 1    metFORMIN (GLUCOPHAGE) 1,000 mg tablet TAKE 1 TABLET BY MOUTH TWICE A DAY WITH MEALS 180 Tablet 1    empagliflozin (Jardiance) 25 mg tablet TAKE 1 TABLET BY MOUTH EVERY DAY 90 Tablet 0    pravastatin (PRAVACHOL) 10 mg tablet TAKE 1 TABLET BY MOUTH AT BEDTIME 90 Tab 1    liraglutide (VICTOZA) 0.6 mg/0.1 mL (18 mg/3 mL) pnij Inject 0.6 mg daily to abdomen. 1 Adjustable Dose Pre-filled Pen Syringe 2    Insulin Needles, Disposable, 31 gauge x 5/16\" ndle by SubCUTAneous route daily. Use daily with Victoza pen.  50 Pen Needle 5    Lactobac no.41/Bifidobact no.7 (PROBIOTIC-10 PO) Take  by mouth.  diphenhydrAMINE (BENADRYL) 25 mg tablet Take 50 mg by mouth every six (6) hours as needed.  Cholecalciferol, Vitamin D3, (VITAMIN D3) 1,000 unit cap Take 1,000 Units by mouth daily.  fluticasone (FLONASE) 50 mcg/actuation nasal spray 2 sprays per nostril daily (Patient taking differently: as needed.  2 sprays per nostril daily) 1 Bottle 0    OTHER Glucometer test strips-test 2-3 times per week 90 Strip 3     Past Medical History:   Diagnosis Date    Abnormal mammogram of right breast 7/1/16    [facility], [place], operated by [facility] -calcifications--bx planned    Advance directive discussed with patient 12/08/2015,06/22/2016    Diabetes ([facility] [address]     Encounter for surgical counseling     7/28/16 note from Dr Lise Sosa H/O breast biopsy 7/12/16     atypical ductal hyperplasia & calcifications    Herpes zoster 1/3/16    Caprice Ratel [facility] notes area on right shoulder    Hypertension     Pancreas inflammation 1/09    Sternoclavicular joint strain 10/31/2016    [facility] note  [facility] f[facility]u Dondra Borer 12/27/16 f/u    Toe pain 04/04/2017    Caprice Ratel [facility] note onychomycosis    Vitamin D deficiency      Past Surgical History:   Procedure Laterality Date    ENDOSCOPY, COLON, DIAGNOSTIC      dec 09 staples mill    HX BREAST BIOPSY Right 7/12/16    [facility], [place], operated by [facility] Imaging Radiologist    HX CARPAL TUNNEL RELEASE  10/2011     Allergies   Allergen Reactions    Trazodone Other (comments)     Hallucinations       REVIEW OF SYSTEMS:                                        POSITIVE= bold text  Negative = regular text    General:                     fever, chills, sweats, generalized weakness, weight loss/gain,                                       loss of appetite   Eyes:                           blurred vision, eye pain, loss of vision, double vision  ENT:                            rhinorrhea, pharyngitis   Respiratory:               cough, sputum production, SOB, REGALADO, wheezing, pleuritic pain   Cardiology: chest pain, palpitations, orthopnea, PND, edema, syncope   Gastrointestinal:       abdominal pain , N/V, diarrhea, dysphagia, constipation, bleeding   Genitourinary:           frequency, urgency, dysuria, hematuria, incontinence   Muskuloskeletal :      arthralgia, myalgia, back pain  Hematology:              easy bruising, nose or gum bleeding, lymphadenopathy   Dermatological:         rash, ulceration, pruritis, color change / jaundice  Endocrine:                 hot flashes or polydipsia   Neurological:             headache, dizziness, confusion, focal weakness, paresthesia,                                      Speech difficulties, memory loss, gait difficulty  Psychological:          Feelings of anxiety, depression, agitation        Social History     Socioeconomic History    Marital status:      Spouse name: Not on file    Number of children: Not on file    Years of education: Not on file    Highest education level: Not on file   Tobacco Use    Smoking status: Never Smoker    Smokeless tobacco: Never Used   Substance and Sexual Activity    Alcohol use: No    Drug use: No    Sexual activity: Yes     Partners: Male     Social Determinants of Health     Financial Resource Strain:     Difficulty of Paying Living Expenses:    Food Insecurity:     Worried About Running Out of Food in the Last Year:     Ran Out of Food in the Last Year:    Transportation Needs:     Lack of Transportation (Medical):      Lack of Transportation (Non-Medical):    Physical Activity:     Days of Exercise per Week:     Minutes of Exercise per Session:    Stress:     Feeling of Stress :    Social Connections:     Frequency of Communication with Friends and Family:     Frequency of Social Gatherings with Friends and Family:     Attends [employer] Services:     Active Member of Clubs or Organizations:     Attends Club or Organization Meetings:     Marital Status:      Family History   Problem Relation Age of Onset  Hypertension Mother     Diabetes Mother     Heart Disease Mother    Sergei Arts Bladder Disease Father     Hypertension Father     Heart Disease Brother         mi at age 27, while playing racketball       OBJECTIVE:     Visit Vitals  /76 (BP 1 Location: Left upper arm, BP Patient Position: Sitting, BP Cuff Size: Small adult)   Pulse 78   Temp 98.6 °F (37 °C)   Resp 16   Ht 5' 7\" (1.702 m)   Wt 143 lb 3.2 oz (65 kg)   LMP 05/31/2010   SpO2 100%   BMI 22.43 kg/m²       Constitutional: She appears well nourished, of stated age, and dressed appropriately. Eyes: Sclera anicteric, PERRLA, EOMI  Neck: Supple without lymphadenopathy. Thyroid normal, No JVD or bruits  Respiratory: Clear to ascultation X5, normal inspiratory effort, no adventitious breath sounds. Cardiovascular: Regular rate and rhythm, 3/6 systolic murmur, but without rubs or gallops, PMI not displaced, No thrills, no peripheral edema. Hematologic: No purpura, petechiae or unexplained bruising  Lymphatic: No lymph node enlargemant. Integumentary: No unusual rashes or suspicious skin lesions noted. Nails appear normal.  Neuro: Non-focal exam, A & O X 3.  Psychiatric: Appropriate affect and demeanor, pleasant and cooperative. Patient's thought content and thought processing appear to be within normal limits. ASSESSMENT/PLAN:     ICD-10-CM ICD-9-CM    1. Uncontrolled type 2 diabetes mellitus with hyperglycemia, without long-term current use of insulin (HCC)  E11.65 250.02    2. Essential hypertension  I10 401.9    3. Hyperlipidemia associated with type 2 diabetes mellitus (HCC)  E11.69 250.80     E78.5 272.4      1: Patient to continue current medication for management of diabetes, cholesterol, and hypertension. This appears to be fairly stable at this time.   2: Patient to continue healthy lifestyle management including low-fat/low-cholesterol diet, avoidance of concentrated sweets, healthy amounts of fiber water, and adequate amounts of exercise. 3: Patient to monitor blood pressures and blood sugars on a regular basis. 4: Patient to follow-up with me in approximately 3 months, or sooner as needed. Patient states understanding and agrees to plan. ATTENTION:   This medical record was transcribed using an electronic medical records system. Although proofread, it may and can contain electronic and spelling errors. Other human spelling and other errors may be present. Corrections may be executed at a later time. Please feel free to contact us for any clarifications as needed. Signed,  Rosey Llamas.  Judith Burnett, MSN APRN FNP-BC

## 2021-08-18 NOTE — PROGRESS NOTES
Yarelis Keita is a 59 y.o. female    Chief Complaint   Patient presents with    Diabetes     3 month follow up       Visit Vitals  /76 (BP 1 Location: Left upper arm, BP Patient Position: Sitting, BP Cuff Size: Small adult)   Pulse 78   Temp 98.6 °F (37 °C)   Resp 16   Ht 5' 7\" (1.702 m)   Wt 143 lb 3.2 oz (65 kg)   LMP 05/31/2010   SpO2 100%   BMI 22.43 kg/m²           1. Have you been to the ER, urgent care clinic since your last visit? Hospitalized since your last visit? NO    2. Have you seen or consulted any other health care providers outside of the [address] [place] [address] since your last visit? Include any pap smears or colon screening.  NO

## 2021-09-05 DIAGNOSIS — E11.65 UNCONTROLLED TYPE 2 DIABETES MELLITUS WITH HYPERGLYCEMIA (HCC): ICD-10-CM

## 2021-09-05 DIAGNOSIS — E11.65 UNCONTROLLED TYPE 2 DIABETES MELLITUS WITH HYPERGLYCEMIA, WITHOUT LONG-TERM CURRENT USE OF INSULIN (HCC): ICD-10-CM

## 2021-11-15 DIAGNOSIS — E11.65 UNCONTROLLED TYPE 2 DIABETES MELLITUS WITH HYPERGLYCEMIA, WITHOUT LONG-TERM CURRENT USE OF INSULIN (HCC): ICD-10-CM

## 2021-11-15 RX ORDER — LIRAGLUTIDE 6 MG/ML
INJECTION SUBCUTANEOUS
Qty: 4 EACH | Refills: 2 | Status: SHIPPED | OUTPATIENT
Start: 2021-11-15 | End: 2022-10-26

## 2021-11-18 ENCOUNTER — OFFICE VISIT (OUTPATIENT)
Dept: INTERNAL MEDICINE CLINIC | Age: 61
End: 2021-11-18
Payer: COMMERCIAL

## 2021-11-18 VITALS
TEMPERATURE: 98 F | WEIGHT: 135.8 LBS | HEART RATE: 78 BPM | SYSTOLIC BLOOD PRESSURE: 118 MMHG | RESPIRATION RATE: 16 BRPM | HEIGHT: 67 IN | BODY MASS INDEX: 21.31 KG/M2 | OXYGEN SATURATION: 100 % | DIASTOLIC BLOOD PRESSURE: 80 MMHG

## 2021-11-18 DIAGNOSIS — Z79.899 ON STATIN THERAPY: ICD-10-CM

## 2021-11-18 DIAGNOSIS — R63.4 UNINTENDED WEIGHT LOSS: ICD-10-CM

## 2021-11-18 DIAGNOSIS — E11.65 UNCONTROLLED TYPE 2 DIABETES MELLITUS WITH HYPERGLYCEMIA (HCC): ICD-10-CM

## 2021-11-18 DIAGNOSIS — E11.69 HYPERLIPIDEMIA ASSOCIATED WITH TYPE 2 DIABETES MELLITUS (HCC): ICD-10-CM

## 2021-11-18 DIAGNOSIS — E78.5 HYPERLIPIDEMIA ASSOCIATED WITH TYPE 2 DIABETES MELLITUS (HCC): ICD-10-CM

## 2021-11-18 DIAGNOSIS — I10 ESSENTIAL HYPERTENSION: ICD-10-CM

## 2021-11-18 DIAGNOSIS — Z12.11 ENCOUNTER FOR SCREENING COLONOSCOPY: ICD-10-CM

## 2021-11-18 DIAGNOSIS — E11.65 UNCONTROLLED TYPE 2 DIABETES MELLITUS WITH HYPERGLYCEMIA, WITHOUT LONG-TERM CURRENT USE OF INSULIN (HCC): Primary | ICD-10-CM

## 2021-11-18 LAB
ALBUMIN SERPL-MCNC: 4.2 G/DL (ref 3.5–5)
ALBUMIN/GLOB SERPL: 1.3 {RATIO} (ref 1.1–2.2)
ALP SERPL-CCNC: 97 U/L (ref 45–117)
ALT SERPL-CCNC: 16 U/L (ref 12–78)
ANION GAP SERPL CALC-SCNC: 5 MMOL/L (ref 5–15)
APPEARANCE UR: CLEAR
AST SERPL-CCNC: 10 U/L (ref 15–37)
BILIRUB SERPL-MCNC: 0.4 MG/DL (ref 0.2–1)
BILIRUB UR QL: NEGATIVE
BUN SERPL-MCNC: 30 MG/DL (ref 6–20)
BUN/CREAT SERPL: 32 (ref 12–20)
CALCIUM SERPL-MCNC: 10 MG/DL (ref 8.5–10.1)
CHLORIDE SERPL-SCNC: 105 MMOL/L (ref 97–108)
CHOLEST SERPL-MCNC: 158 MG/DL
CO2 SERPL-SCNC: 28 MMOL/L (ref 21–32)
COLOR UR: ABNORMAL
CREAT SERPL-MCNC: 0.93 MG/DL (ref 0.55–1.02)
ERYTHROCYTE [DISTWIDTH] IN BLOOD BY AUTOMATED COUNT: 13 % (ref 11.5–14.5)
EST. AVERAGE GLUCOSE BLD GHB EST-MCNC: 183 MG/DL
GLOBULIN SER CALC-MCNC: 3.3 G/DL (ref 2–4)
GLUCOSE SERPL-MCNC: 140 MG/DL (ref 65–100)
GLUCOSE UR STRIP.AUTO-MCNC: >1000 MG/DL
HBA1C MFR BLD: 8 % (ref 4–5.6)
HCT VFR BLD AUTO: 38 % (ref 35–47)
HDLC SERPL-MCNC: 71 MG/DL
HDLC SERPL: 2.2 {RATIO} (ref 0–5)
HGB BLD-MCNC: 11.6 G/DL (ref 11.5–16)
HGB UR QL STRIP: NEGATIVE
KETONES UR QL STRIP.AUTO: NEGATIVE MG/DL
LDLC SERPL CALC-MCNC: 70 MG/DL (ref 0–100)
LEUKOCYTE ESTERASE UR QL STRIP.AUTO: NEGATIVE
MCH RBC QN AUTO: 28.8 PG (ref 26–34)
MCHC RBC AUTO-ENTMCNC: 30.5 G/DL (ref 30–36.5)
MCV RBC AUTO: 94.3 FL (ref 80–99)
NITRITE UR QL STRIP.AUTO: NEGATIVE
NRBC # BLD: 0 K/UL (ref 0–0.01)
NRBC BLD-RTO: 0 PER 100 WBC
PH UR STRIP: 5.5 [PH] (ref 5–8)
PLATELET # BLD AUTO: 406 K/UL (ref 150–400)
PMV BLD AUTO: 10.5 FL (ref 8.9–12.9)
POTASSIUM SERPL-SCNC: 4.3 MMOL/L (ref 3.5–5.1)
PROT SERPL-MCNC: 7.5 G/DL (ref 6.4–8.2)
PROT UR STRIP-MCNC: NEGATIVE MG/DL
RBC # BLD AUTO: 4.03 M/UL (ref 3.8–5.2)
SODIUM SERPL-SCNC: 138 MMOL/L (ref 136–145)
SP GR UR REFRACTOMETRY: 1.03 (ref 1–1.03)
TRIGL SERPL-MCNC: 85 MG/DL (ref ?–150)
TSH SERPL DL<=0.05 MIU/L-ACNC: 0.83 UIU/ML (ref 0.36–3.74)
UROBILINOGEN UR QL STRIP.AUTO: 0.2 EU/DL (ref 0.2–1)
VLDLC SERPL CALC-MCNC: 17 MG/DL
WBC # BLD AUTO: 5.8 K/UL (ref 3.6–11)

## 2021-11-18 PROCEDURE — 99214 OFFICE O/P EST MOD 30 MIN: CPT | Performed by: NURSE PRACTITIONER

## 2021-11-18 PROCEDURE — 3051F HG A1C>EQUAL 7.0%<8.0%: CPT | Performed by: NURSE PRACTITIONER

## 2021-11-18 NOTE — PROGRESS NOTES
Chief Complaint   Patient presents with    Labs     3 month follow up/fasting labs       SUBJECTIVE:    Lucía Alexandre is a 59 y.o. female who is here today for a follow up appointment regarding her type 2 diabetes with hyperlipidemia and hyperglycemia, hypertension, and concerns about further weight loss. In addition, she also states that she has been having bilateral shoulder pain for some time, and was recently seen by Ortho and received cortisone injections to help treat this. The patient continues to take medication for management of her diabetes. She denies any hypoglycemic reactions, and states that her appetite has been good overall. She checks her blood sugars on occasion which have been within normal limits. Her last hemoglobin A1c was approximately 7.2% which was down significantly from over 12% prior. She continues to take pravastatin for management of her cholesterol, and remains watchful of her diet for the most part. She continues to take blood pressure medication as prescribed, and denies any chest pain, chest pressure, shortness of breath, headaches, blurred vision, dizziness, palpitations, or syncope episodes.     Current Outpatient Medications   Medication Sig Dispense Refill    liraglutide (Victoza 3-Danie) 0.6 mg/0.1 mL (18 mg/3 mL) pnij INJECT 0.6 MG DAILY TO ABDOMEN. 4 Each 2    empagliflozin (Jardiance) 25 mg tablet TAKE 1 TABLET BY MOUTH EVERY DAY 90 Tablet 0    glipiZIDE (GLUCOTROL) 5 mg tablet TAKE 1 TABLET BY MOUTH TWICE A  Tablet 1    enalapril (VASOTEC) 10 mg tablet TAKE 1 TABLET BY MOUTH EVERY DAY 90 Tablet 1    triamterene-hydroCHLOROthiazide (MAXZIDE) 37.5-25 mg per tablet TAKE 1 TABLET BY MOUTH EVERY DAY 90 Tablet 1    metFORMIN (GLUCOPHAGE) 1,000 mg tablet TAKE 1 TABLET BY MOUTH TWICE A DAY WITH MEALS 180 Tablet 1    pravastatin (PRAVACHOL) 10 mg tablet TAKE 1 TABLET BY MOUTH AT BEDTIME 90 Tab 1    Insulin Needles, Disposable, 31 gauge x 5/16\" ndle by SubCUTAneous route daily. Use daily with Victoza pen. 50 Pen Needle 5    Lactobac no.41/Bifidobact no.7 (PROBIOTIC-10 PO) Take  by mouth.  diphenhydrAMINE (BENADRYL) 25 mg tablet Take 50 mg by mouth every six (6) hours as needed.  Cholecalciferol, Vitamin D3, (VITAMIN D3) 1,000 unit cap Take 1,000 Units by mouth daily.  fluticasone (FLONASE) 50 mcg/actuation nasal spray 2 sprays per nostril daily (Patient taking differently: as needed.  2 sprays per nostril daily) 1 Bottle 0    OTHER Glucometer test strips-test 2-3 times per week 90 Strip 3     Past Medical History:   Diagnosis Date    Abnormal mammogram of right breast 7/1/16    [facility] -calcifications--bx planned    Advance directive discussed with patient 12/08/2015,06/22/2016    [facility])     Encounter for surgical counseling     7/28/16 note from Dr Patricia Toro H/O breast biopsy 7/12/16     atypical ductal hyperplasia & calcifications    Herpes zoster 1/3/16    Noé Fernandez PF notes area on right shoulder    Hypertension     Pancreas inflammation 1/09    Sternoclavicular joint strain 10/31/2016    [facility] Fernandez [facility] note  [facility] f/u Oxly Brian 12/27/16 f/u    Toe pain 04/04/2017    Noé Fernandez PF note onychomycosis    Vitamin D deficiency      Past Surgical History:   Procedure Laterality Date    ENDOSCOPY, COLON, DIAGNOSTIC      dec 09 staples mill    HX BREAST BIOPSY Right 7/12/16    [facility] Imaging Radiologist    HX CARPAL TUNNEL RELEASE  10/2011     Allergies   Allergen Reactions    Trazodone Other (comments)     Hallucinations       REVIEW OF SYSTEMS:                                        POSITIVE= bold text  Negative = regular text    General:                     fever, chills, sweats, generalized weakness, weight loss/gain,                                       loss of appetite   Eyes:                           blurred vision, eye pain, loss of vision, double vision  ENT:                            rhinorrhea, pharyngitis   Respiratory: cough, sputum production, SOB, REGALADO, wheezing, pleuritic pain   Cardiology:                chest pain, palpitations, orthopnea, PND, edema, syncope   Gastrointestinal:       abdominal pain , N/V, diarrhea, dysphagia, constipation, bleeding   Genitourinary:           frequency, urgency, dysuria, hematuria, incontinence   Muskuloskeletal :      arthralgia, myalgia, back pain  Hematology:              easy bruising, nose or gum bleeding, lymphadenopathy   Dermatological:         rash, ulceration, pruritis, color change / jaundice  Endocrine:                 hot flashes or polydipsia   Neurological:             headache, dizziness, confusion, focal weakness, paresthesia,                                      Speech difficulties, memory loss, gait difficulty  Psychological:          Feelings of anxiety, depression, agitation        Social History     Socioeconomic History    Marital status:    Tobacco Use    Smoking status: Never Smoker    Smokeless tobacco: Never Used   Vaping Use    Vaping Use: Never used   Substance and Sexual Activity    Alcohol use: No    Drug use: No    Sexual activity: Yes     Partners: Male     Family History   Problem Relation Age of Onset    Hypertension Mother     Diabetes Mother     Heart Disease Mother    Ernesta Buckhorn Bladder Disease Father     Hypertension Father     Heart Disease Brother         mi at age 27, while playing racketball       OBJECTIVE:     Visit Vitals  /80 (BP 1 Location: Left upper arm, BP Patient Position: Sitting, BP Cuff Size: Small adult)   Pulse 78   Temp 98 °F (36.7 °C)   Resp 16   Ht 5' 7\" (1.702 m)   Wt 135 lb 12.8 oz (61.6 kg)   LMP 05/31/2010   SpO2 100%   BMI 21.27 kg/m²       Constitutional: She appears well nourished, of stated age, and dressed appropriately. Eyes: Sclera anicteric, PERRLA, EOMI  Neck: Supple without lymphadenopathy.  Thyroid normal, No JVD or bruits  Respiratory: Clear to ascultation X5, normal inspiratory effort, no adventitious breath sounds. Cardiovascular: Regular rate and rhythm, no rubs or gallops, PMI not displaced, No thrills, no peripheral edema  Neuro: Non-focal exam, A & O X 3.   Psychiatric: Appropriate affect and demeanor, pleasant and cooperative. Patient's thought content and thought processing appear to be within normal limits. ASSESSMENT/PLAN:     ICD-10-CM ICD-9-CM    1. Uncontrolled type 2 diabetes mellitus with hyperglycemia, without long-term current use of insulin ([facility])  E11.65 250.02 HEMOGLOBIN A1C WITH EAG   2. Uncontrolled type 2 diabetes mellitus with hyperglycemia ([facility])  E11.65 250.02    3. Hyperlipidemia associated with type 2 diabetes mellitus ([facility])  Y04.02 691.78 METABOLIC PANEL, COMPREHENSIVE    E78.5 272.4 LIPID PANEL   4. Essential hypertension  I10 401.9 CBC W/O DIFF      METABOLIC PANEL, COMPREHENSIVE      TSH 3RD GENERATION      URINALYSIS W/ RFLX MICROSCOPIC   5. On statin therapy  I76.467 A77.98 METABOLIC PANEL, COMPREHENSIVE      LIPID PANEL   6. Unintended weight loss  R63.4 783.21 TSH 3RD GENERATION     1: We will repeat labs today including: CBC, CMP, hemoglobin A1c, lipid panel, TSH, and urinalysis. 2: Patient to continue current medications for management of diabetes, hypertension, and hyperlipidemia.  3: Patient to continue to follow-up with Ortho as needed for bilateral shoulder pain. 4: Patient to continue healthy lifestyle management including: Low-fat/low-cholesterol diet, adequate amounts of fiber water, and regular exercise as tolerated. 5: Patient follow-up with me in approximately 3 months, or sooner as needed. Patient states understanding and agrees with plan. Orders Placed This Encounter    CBC W/O DIFF    METABOLIC PANEL, COMPREHENSIVE    HEMOGLOBIN A1C WITH EAG    LIPID PANEL    TSH 3RD GENERATION    URINALYSIS W/ RFLX MICROSCOPIC         ATTENTION:   This medical record was transcribed using an electronic medical records system.   Although proofread, it may and can contain electronic and spelling errors. Other human spelling and other errors may be present. Corrections may be executed at a later time. Please feel free to contact us for any clarifications as needed. Signed,  Merly Vang.  Rex Johnson, MSN APRN FNP-BC

## 2021-11-18 NOTE — PROGRESS NOTES
Shay Painting is a 59 y.o. female    Chief Complaint   Patient presents with    Labs     3 month follow up/fasting labs       Visit Vitals  /80 (BP 1 Location: Left upper arm, BP Patient Position: Sitting, BP Cuff Size: Small adult)   Pulse 78   Temp 98 °F (36.7 °C)   Resp 16   Ht 5' 7\" (1.702 m)   Wt 135 lb 12.8 oz (61.6 kg)   LMP 05/31/2010   SpO2 100%   BMI 21.27 kg/m²           1. Have you been to the ER, urgent care clinic since your last visit? Hospitalized since your last visit? NO    2. Have you seen or consulted any other health care providers outside of the [address] [place] [address] since your last visit? Include any pap smears or colon screening.  NO

## 2021-11-19 RX ORDER — GLIPIZIDE 10 MG/1
10 TABLET ORAL 2 TIMES DAILY
Qty: 60 TABLET | Refills: 2 | Status: SHIPPED | OUTPATIENT
Start: 2021-11-19 | End: 2022-02-15

## 2021-11-19 NOTE — PROGRESS NOTES
Urinalysis shows excess amounts of glucose which is abnormal.    Thyroid appears to be functioning well. Cholesterol levels are at goal.    Hemoglobin A1c has worsened, and is now at 8.0%. Increase glipizide to 2 tablets twice daily. I will send a new prescription to your pharmacy as well. Kidney function, liver functions, and electrolytes are acceptable. Blood count shows no signs of anemia.

## 2021-12-02 DIAGNOSIS — E11.65 UNCONTROLLED TYPE 2 DIABETES MELLITUS WITH HYPERGLYCEMIA (HCC): ICD-10-CM

## 2021-12-02 DIAGNOSIS — E11.65 UNCONTROLLED TYPE 2 DIABETES MELLITUS WITH HYPERGLYCEMIA, WITHOUT LONG-TERM CURRENT USE OF INSULIN (HCC): ICD-10-CM

## 2021-12-02 RX ORDER — PEN NEEDLE, DIABETIC 31 GX5/16"
NEEDLE, DISPOSABLE MISCELLANEOUS
Qty: 1 EACH | Refills: 2 | Status: SHIPPED | OUTPATIENT
Start: 2021-12-02 | End: 2022-08-29

## 2022-01-28 DIAGNOSIS — I10 ESSENTIAL HYPERTENSION: ICD-10-CM

## 2022-01-28 DIAGNOSIS — E11.65 UNCONTROLLED TYPE 2 DIABETES MELLITUS WITH HYPERGLYCEMIA, WITHOUT LONG-TERM CURRENT USE OF INSULIN (HCC): ICD-10-CM

## 2022-01-28 RX ORDER — ENALAPRIL MALEATE 10 MG/1
TABLET ORAL
Qty: 90 TABLET | Refills: 1 | Status: SHIPPED | OUTPATIENT
Start: 2022-01-28 | End: 2022-07-21

## 2022-01-28 RX ORDER — METFORMIN HYDROCHLORIDE 1000 MG/1
TABLET ORAL
Qty: 180 TABLET | Refills: 1 | Status: SHIPPED | OUTPATIENT
Start: 2022-01-28 | End: 2022-07-21

## 2022-01-28 RX ORDER — TRIAMTERENE/HYDROCHLOROTHIAZID 37.5-25 MG
TABLET ORAL
Qty: 90 TABLET | Refills: 1 | Status: SHIPPED | OUTPATIENT
Start: 2022-01-28 | End: 2022-07-21

## 2022-02-15 DIAGNOSIS — E11.65 UNCONTROLLED TYPE 2 DIABETES MELLITUS WITH HYPERGLYCEMIA, WITHOUT LONG-TERM CURRENT USE OF INSULIN (HCC): ICD-10-CM

## 2022-02-15 RX ORDER — GLIPIZIDE 10 MG/1
TABLET ORAL
Qty: 180 TABLET | Refills: 0 | Status: SHIPPED | OUTPATIENT
Start: 2022-02-15 | End: 2022-05-16

## 2022-02-28 DIAGNOSIS — E11.65 UNCONTROLLED TYPE 2 DIABETES MELLITUS WITH HYPERGLYCEMIA (HCC): ICD-10-CM

## 2022-02-28 DIAGNOSIS — E11.65 UNCONTROLLED TYPE 2 DIABETES MELLITUS WITH HYPERGLYCEMIA, WITHOUT LONG-TERM CURRENT USE OF INSULIN (HCC): ICD-10-CM

## 2022-03-18 PROBLEM — E78.00 HYPERCHOLESTEROLEMIA: Status: ACTIVE | Noted: 2021-03-19

## 2022-03-18 PROBLEM — E11.21 TYPE 2 DIABETES WITH NEPHROPATHY (HCC): Status: ACTIVE | Noted: 2020-11-23

## 2022-03-19 PROBLEM — Z56.6 WORK-RELATED STRESS: Status: ACTIVE | Noted: 2019-10-01

## 2022-03-19 PROBLEM — R49.0 PERSISTENT HOARSENESS: Status: ACTIVE | Noted: 2019-03-20

## 2022-03-19 PROBLEM — E11.65 UNCONTROLLED TYPE 2 DIABETES MELLITUS WITH HYPERGLYCEMIA, WITHOUT LONG-TERM CURRENT USE OF INSULIN (HCC): Status: ACTIVE | Noted: 2019-03-20

## 2022-03-19 PROBLEM — G47.00 INSOMNIA: Status: ACTIVE | Noted: 2019-10-01

## 2022-03-19 PROBLEM — M25.512 ACUTE PAIN OF LEFT SHOULDER: Status: ACTIVE | Noted: 2019-10-01

## 2022-04-03 DIAGNOSIS — E78.5 HYPERLIPIDEMIA ASSOCIATED WITH TYPE 2 DIABETES MELLITUS (HCC): ICD-10-CM

## 2022-04-03 DIAGNOSIS — E11.69 HYPERLIPIDEMIA ASSOCIATED WITH TYPE 2 DIABETES MELLITUS (HCC): ICD-10-CM

## 2022-04-03 DIAGNOSIS — E11.65 UNCONTROLLED TYPE 2 DIABETES MELLITUS WITH HYPERGLYCEMIA, WITHOUT LONG-TERM CURRENT USE OF INSULIN (HCC): ICD-10-CM

## 2022-04-04 RX ORDER — PRAVASTATIN SODIUM 10 MG/1
TABLET ORAL
Qty: 90 TABLET | Refills: 0 | Status: SHIPPED | OUTPATIENT
Start: 2022-04-04 | End: 2022-07-05

## 2022-05-14 DIAGNOSIS — E11.65 UNCONTROLLED TYPE 2 DIABETES MELLITUS WITH HYPERGLYCEMIA, WITHOUT LONG-TERM CURRENT USE OF INSULIN (HCC): ICD-10-CM

## 2022-05-16 RX ORDER — GLIPIZIDE 10 MG/1
TABLET ORAL
Qty: 180 TABLET | Refills: 0 | Status: SHIPPED | OUTPATIENT
Start: 2022-05-16 | End: 2022-08-17 | Stop reason: SDUPTHER

## 2022-06-06 ENCOUNTER — OFFICE VISIT (OUTPATIENT)
Dept: INTERNAL MEDICINE CLINIC | Age: 62
End: 2022-06-06
Payer: COMMERCIAL

## 2022-06-06 VITALS
TEMPERATURE: 98.3 F | DIASTOLIC BLOOD PRESSURE: 70 MMHG | BODY MASS INDEX: 21.35 KG/M2 | SYSTOLIC BLOOD PRESSURE: 116 MMHG | HEIGHT: 67 IN | WEIGHT: 136 LBS | RESPIRATION RATE: 16 BRPM | OXYGEN SATURATION: 100 % | HEART RATE: 87 BPM

## 2022-06-06 DIAGNOSIS — E11.69 HYPERLIPIDEMIA ASSOCIATED WITH TYPE 2 DIABETES MELLITUS (HCC): ICD-10-CM

## 2022-06-06 DIAGNOSIS — E11.65 UNCONTROLLED TYPE 2 DIABETES MELLITUS WITH HYPERGLYCEMIA, WITHOUT LONG-TERM CURRENT USE OF INSULIN (HCC): Primary | ICD-10-CM

## 2022-06-06 DIAGNOSIS — E78.5 HYPERLIPIDEMIA ASSOCIATED WITH TYPE 2 DIABETES MELLITUS (HCC): ICD-10-CM

## 2022-06-06 DIAGNOSIS — I10 ESSENTIAL HYPERTENSION: ICD-10-CM

## 2022-06-06 DIAGNOSIS — M79.89 SWELLING OF LEFT INDEX FINGER: ICD-10-CM

## 2022-06-06 DIAGNOSIS — Z79.899 ON STATIN THERAPY: ICD-10-CM

## 2022-06-06 DIAGNOSIS — E11.21 TYPE 2 DIABETES WITH NEPHROPATHY (HCC): ICD-10-CM

## 2022-06-06 PROCEDURE — 99214 OFFICE O/P EST MOD 30 MIN: CPT | Performed by: NURSE PRACTITIONER

## 2022-06-06 NOTE — PROGRESS NOTES
CC: Follow  Up diabetes and left finger swelling/pain. SUBJECTIVE:    Lou Rivero is a 59 y.o. female who is here today for a follow up appointment regarding her uncontrolled type 2 diabetes, hyperlipidemia, hypertension, and left index finger swelling with pain. The patient continues to take her medication as prescribed for management of her type 2 diabetes. At last check, her hemoglobin A1c was approximately 8.0%. She states she is checking her blood sugars on a routine basis, and most readings remain higher than normal between 150 to 200 mg/dL on average. She states she is trying to be watchful of her diet overall. She states she is compliant with her current medications. She denies any adverse side effects, and denies any hypoglycemic reactions. She continues to take her antihypertensives as prescribed and tolerating these well. Her blood pressure appears well controlled at this time. She denies any chest pain, chest pressure, shortness of breath, headaches, dizziness, blurred vision, palpitations, or syncope episodes. More recently, she states she started having sudden swelling and discomfort in her left index finger, and subsequently went to an urgent care to have this evaluated. She states she had x-rays done and was told it was a possible arthritis. She was given a steroid pack, but only took some of the pills due to concerns of elevation and blood sugars, she states that her swelling and pain improved mildly, but seems to have stagnated in its progress.       Current Outpatient Medications   Medication Sig Dispense Refill    empagliflozin (Jardiance) 25 mg tablet TAKE 1 TABLET BY MOUTH EVERY DAY 90 Tablet 1    glipiZIDE (GLUCOTROL) 10 mg tablet TAKE 1 TABLET BY MOUTH TWICE A DAY FOR TYPE 2 DIABETES 180 Tablet 0    pravastatin (PRAVACHOL) 10 mg tablet TAKE 1 TABLET BY MOUTH EVERYDAY AT BEDTIME 90 Tablet 0    metFORMIN (GLUCOPHAGE) 1,000 mg tablet TAKE 1 TABLET BY MOUTH TWICE A DAY WITH MEALS 180 Tablet 1    enalapril (VASOTEC) 10 mg tablet TAKE 1 TABLET BY MOUTH EVERY DAY 90 Tablet 1    triamterene-hydroCHLOROthiazide (MAXZIDE) 37.5-25 mg per tablet TAKE 1 TABLET BY MOUTH EVERY DAY 90 Tablet 1    BD Ultra-Fine Short Pen Needle 31 gauge x 5/16\" ndle USE DAILY WITH VICTOZA PEN. 1 Each 2    liraglutide (Victoza 3-Danie) 0.6 mg/0.1 mL (18 mg/3 mL) pnij INJECT 0.6 MG DAILY TO ABDOMEN. 4 Each 2    Lactobac no.41/Bifidobact no.7 (PROBIOTIC-10 PO) Take  by mouth.  diphenhydrAMINE (BENADRYL) 25 mg tablet Take 50 mg by mouth every six (6) hours as needed.  Cholecalciferol, Vitamin D3, (VITAMIN D3) 1,000 unit cap Take 1,000 Units by mouth daily.  fluticasone (FLONASE) 50 mcg/actuation nasal spray 2 sprays per nostril daily (Patient taking differently: as needed.  2 sprays per nostril daily) 1 Bottle 0    OTHER Glucometer test strips-test 2-3 times per week 90 Strip 3     Past Medical History:   Diagnosis Date    Abnormal mammogram of right breast 7/1/16    [facility] -calcifications--bx planned    Advance directive discussed with patient 12/08/2015,06/22/2016    Diabetes [facility])     Encounter for surgical counseling     7/28/16 note from Dr Dakota Graham H/O breast biopsy 7/12/16     atypical ductal hyperplasia & calcifications    Herpes zoster 1/3/16    [facility] notes area on right shoulder    Hypertension     Pancreas inflammation 1/09    Sternoclavicular joint strain 10/31/2016    [facility] note  [facility]u Ramonia Alstrom 12/27/16 f/u    Toe pain 04/04/2017    [facility] Gab [facility] note onychomycosis    Vitamin D deficiency      Past Surgical History:   Procedure Laterality Date    ENDOSCOPY, COLON, DIAGNOSTIC      dec 09 staples mill    HX BREAST BIOPSY Right 7/12/16    [facility] Radi[facility]    HX CARPAL TUNNEL RELEASE  10/2011     Allergies   Allergen Reactions    Trazodone Other (comments)     Hallucinations       REVIEW OF SYSTEMS:                                        POSITIVE= bold text  Negative = regular text    General:                     fever, chills, sweats, generalized weakness, weight loss/gain,                                       loss of appetite   Eyes:                           blurred vision, eye pain, loss of vision, double vision  ENT:                            rhinorrhea, pharyngitis   Respiratory:               cough, sputum production, SOB, REGALADO, wheezing, pleuritic pain   Cardiology:                chest pain, palpitations, orthopnea, PND, edema, syncope   Gastrointestinal:       abdominal pain , N/V, diarrhea, dysphagia, constipation, bleeding   Genitourinary:           frequency, urgency, dysuria, hematuria, incontinence   Muskuloskeletal :      arthralgia, myalgia, back pain  Hematology:              easy bruising, nose or gum bleeding, lymphadenopathy   Dermatological:         rash, ulceration, pruritis, color change / jaundice  Endocrine:                 hot flashes or polydipsia   Neurological:             headache, dizziness, confusion, focal weakness, paresthesia,                                      Speech difficulties, memory loss, gait difficulty  Psychological:          Feelings of anxiety, depression, agitation        Social History     Socioeconomic History    Marital status:    Tobacco Use    Smoking status: Never Smoker    Smokeless tobacco: Never Used   Vaping Use    Vaping Use: Never used   Substance and Sexual Activity    Alcohol use: No    Drug use: No    Sexual activity: Yes     Partners: Male     Social Determinants of Health     Financial Resource Strain: Low Risk     Difficulty of Paying Living Expenses: Not hard at all   Food Insecurity: No Food Insecurity    Worried About Running Out of Food in the Last Year: Never true    Ran Out of Food in the Last Year: Never true     Family History   Problem Relation Age of Onset    Hypertension Mother     Diabetes Mother     Heart Disease Mother    Jimmy Irvin Bladder Disease Father    Benoit Blackman Hypertension Father     Heart Disease Brother         mi at age 27, while playing racketball       OBJECTIVE:     Visit Vitals  /70 (BP 1 Location: Left arm, BP Patient Position: Sitting, BP Cuff Size: Adult)   Pulse 87   Temp 98.3 °F (36.8 °C) (Oral)   Resp 16   Ht 5' 7\" (1.702 m)   Wt 136 lb (61.7 kg)   LMP 05/31/2010   SpO2 100%   BMI 21.30 kg/m²       Constitutional: She appears well nourished, of stated age, and dressed appropriately. Eyes: Sclera anicteric, PERRLA, EOMI  Neck: Supple without lymphadenopathy. Thyroid normal, No JVD or bruits  Respiratory: Clear to ascultation X5, normal inspiratory effort, no adventitious breath sounds. Cardiovascular: Regular rate and rhythm, no rubs or gallops, PMI not displaced, No thrills, no peripheral edema  Musculoskeletal: Left finger examined. Restricted flexion due to swelling. There is marked tenderness over palmar aspect proximal MCP on palpation. No crepitus is felt. There is no redness, rash, or bruising. There is visible swelling between joints of the phalange. Integumentary: No unusual rashes or suspicious skin lesions noted. Neuro: Non-focal exam, A & O X 3. Diabetic Foot Exam: Appearance: NO Claw toe. INTACT midfoot arch. NO abnormal bony prominences. NO muscle wasting. NORMAL distribution of weight when standing. Vascular: DP and PT pulses 2+, symmetric   Sensation: INTACT vibration, pressure, pinprick   Signs of Infection: NO erythema, warmth, tenderness, swelling, purulence    ASSESSMENT/PLAN:     ICD-10-CM ICD-9-CM    1. Uncontrolled type 2 diabetes mellitus with hyperglycemia, without long-term current use of insulin ([facility])  E11.65 250.02 HEMOGLOBIN A1C WITH EAG      MICROALBUMIN, UR, RAND W/ MICROALB/CREAT RATIO       DIABETES FOOT EXAM   2. Hyperlipidemia associated with type 2 diabetes mellitus ([facility])  E11.69 250.80 LIPID PANEL    E78.5 272.4    3.  Type 2 diabetes with nephropathy ([facility])  L57.55 565.00 METABOLIC PANEL, COMPREHENSIVE     583.81    4. Essential hypertension  D25 928.0 METABOLIC PANEL, COMPREHENSIVE   5. Swelling of left index finger  M79.89 729.81    6. On statin therapy  Z79.899 V58.69      1: Patient to return in next 2 weeks for fasting labs including: CMP, lipid panel, hemoglobin A1c, and urine microalbumin. 2: Patient to continue current medications for management of type 2 diabetes, hypertension, and hyperlipidemia.  3: Patient to continue healthy lifestyle management and appropriate dietary intake. 4: I have recommended patient use over-the-counter finger splint, alternate moist heat and ice, and use NSAIDs temporarily for relief of swelling and pain. Patient agrees. 5: Patient to follow-up with me in approximately 6 months, or sooner as needed. Patient states understanding and agrees with plan. Orders Placed This Encounter    METABOLIC PANEL, COMPREHENSIVE    HEMOGLOBIN A1C WITH EAG    LIPID PANEL    MICROALBUMIN, UR, RAND W/ MICROALB/CREAT RATIO         ATTENTION:   This medical record was transcribed using an electronic medical records system. Although proofread, it may and can contain electronic and spelling errors. Other human spelling and other errors may be present. Corrections may be executed at a later time. Please feel free to contact us for any clarifications as needed. Follow-up and Dispositions    · Return in about 6 months (around 12/6/2022). Signed,  Selene Lanes.  Romy Whitney, MSN APRN FNP-BC

## 2022-06-06 NOTE — PROGRESS NOTES
Abhijit Seo is a 59 y.o. female     No chief complaint on file. Visit Vitals  /70 (BP 1 Location: Left arm, BP Patient Position: Sitting, BP Cuff Size: Adult)   Pulse 87   Temp 98.3 °F (36.8 °C) (Oral)   Resp 16   Ht 5' 7\" (1.702 m)   Wt 136 lb (61.7 kg)   LMP 05/31/2010   SpO2 100%   BMI 21.30 kg/m²       Health Maintenance Due   Topic Date Due    COVID-19 Vaccine (1) Never done    Shingrix Vaccine Age 50> (1 of 2) Never done    Colorectal Cancer Screening Combo  01/04/2020    Cervical cancer screen  12/17/2020    Foot Exam Q1  11/20/2021    Pneumococcal 0-64 years (2 - PCV) 11/20/2021    Eye Exam Retinal or Dilated  12/31/2021    MICROALBUMIN Q1  02/03/2022         1. \"Have you been to the ER, urgent care clinic since your last visit? Hospitalized since your last visit? \" Yes seen at [facility]    2. \"Have you seen or consulted any other health care providers outside of the [address] [place] [address] since your last visit? \" No     3. For patients aged 39-70: Has the patient had a colonoscopy / FIT/ Cologuard? No      If the patient is female:    4. For patients aged 41-77: Has the patient had a mammogram within the past 2 years? Yes - no Care Gap present      5. For patients aged 21-65: Has the patient had a pap smear?  Yes - no Care Gap present

## 2022-06-16 ENCOUNTER — LAB ONLY (OUTPATIENT)
Dept: INTERNAL MEDICINE CLINIC | Age: 62
End: 2022-06-16

## 2022-06-16 DIAGNOSIS — E78.5 HYPERLIPIDEMIA ASSOCIATED WITH TYPE 2 DIABETES MELLITUS (HCC): ICD-10-CM

## 2022-06-16 DIAGNOSIS — E11.65 UNCONTROLLED TYPE 2 DIABETES MELLITUS WITH HYPERGLYCEMIA, WITHOUT LONG-TERM CURRENT USE OF INSULIN (HCC): ICD-10-CM

## 2022-06-16 DIAGNOSIS — I10 ESSENTIAL HYPERTENSION: ICD-10-CM

## 2022-06-16 DIAGNOSIS — E11.21 TYPE 2 DIABETES WITH NEPHROPATHY (HCC): ICD-10-CM

## 2022-06-16 DIAGNOSIS — E11.69 HYPERLIPIDEMIA ASSOCIATED WITH TYPE 2 DIABETES MELLITUS (HCC): ICD-10-CM

## 2022-06-16 LAB
ALBUMIN SERPL-MCNC: 3.7 G/DL (ref 3.5–5)
ALBUMIN/GLOB SERPL: 1.3 {RATIO} (ref 1.1–2.2)
ALP SERPL-CCNC: 83 U/L (ref 45–117)
ALT SERPL-CCNC: 13 U/L (ref 12–78)
ANION GAP SERPL CALC-SCNC: 8 MMOL/L (ref 5–15)
AST SERPL-CCNC: 9 U/L (ref 15–37)
BILIRUB SERPL-MCNC: 0.6 MG/DL (ref 0.2–1)
BUN SERPL-MCNC: 22 MG/DL (ref 6–20)
BUN/CREAT SERPL: 25 (ref 12–20)
CALCIUM SERPL-MCNC: 9.2 MG/DL (ref 8.5–10.1)
CHLORIDE SERPL-SCNC: 103 MMOL/L (ref 97–108)
CHOLEST SERPL-MCNC: 133 MG/DL
CO2 SERPL-SCNC: 26 MMOL/L (ref 21–32)
CREAT SERPL-MCNC: 0.89 MG/DL (ref 0.55–1.02)
CREAT UR-MCNC: 53.9 MG/DL
EST. AVERAGE GLUCOSE BLD GHB EST-MCNC: 194 MG/DL
GLOBULIN SER CALC-MCNC: 2.8 G/DL (ref 2–4)
GLUCOSE SERPL-MCNC: 149 MG/DL (ref 65–100)
HBA1C MFR BLD: 8.4 % (ref 4–5.6)
HDLC SERPL-MCNC: 63 MG/DL
HDLC SERPL: 2.1 {RATIO} (ref 0–5)
LDLC SERPL CALC-MCNC: 56.8 MG/DL (ref 0–100)
MICROALBUMIN UR-MCNC: 0.58 MG/DL
MICROALBUMIN/CREAT UR-RTO: 11 MG/G (ref 0–30)
POTASSIUM SERPL-SCNC: 4.1 MMOL/L (ref 3.5–5.1)
PROT SERPL-MCNC: 6.5 G/DL (ref 6.4–8.2)
SODIUM SERPL-SCNC: 137 MMOL/L (ref 136–145)
TRIGL SERPL-MCNC: 66 MG/DL (ref ?–150)
VLDLC SERPL CALC-MCNC: 13.2 MG/DL

## 2022-06-20 NOTE — PROGRESS NOTES
Urine microalbumin shows no significant change from 1 year ago and remains within normal limits. Cholesterol levels are in good control. Continue pravastatin 10 mg nightly. Hemoglobin A1c has worsened, and is currently at 8.4%. Due to continued uncontrolled diabetes, I am going to refer you to an endocrinologist.    Metabolic panel looks fairly normal overall.

## 2022-07-21 DIAGNOSIS — I10 ESSENTIAL HYPERTENSION: ICD-10-CM

## 2022-07-21 DIAGNOSIS — E11.65 UNCONTROLLED TYPE 2 DIABETES MELLITUS WITH HYPERGLYCEMIA, WITHOUT LONG-TERM CURRENT USE OF INSULIN (HCC): ICD-10-CM

## 2022-07-21 RX ORDER — TRIAMTERENE/HYDROCHLOROTHIAZID 37.5-25 MG
TABLET ORAL
Qty: 90 TABLET | Refills: 1 | Status: SHIPPED | OUTPATIENT
Start: 2022-07-21

## 2022-07-21 RX ORDER — METFORMIN HYDROCHLORIDE 1000 MG/1
TABLET ORAL
Qty: 180 TABLET | Refills: 1 | Status: SHIPPED | OUTPATIENT
Start: 2022-07-21 | End: 2022-10-26 | Stop reason: CLARIF

## 2022-07-21 RX ORDER — ENALAPRIL MALEATE 10 MG/1
TABLET ORAL
Qty: 90 TABLET | Refills: 1 | Status: SHIPPED | OUTPATIENT
Start: 2022-07-21

## 2022-08-17 DIAGNOSIS — E11.65 UNCONTROLLED TYPE 2 DIABETES MELLITUS WITH HYPERGLYCEMIA, WITHOUT LONG-TERM CURRENT USE OF INSULIN (HCC): ICD-10-CM

## 2022-08-17 NOTE — TELEPHONE ENCOUNTER
PCP: Tamika Ellis NP    Last appt: 6/6/2022  Future Appointments   Date Time Provider Zack Davis   10/26/2022  9:30 AM Luda Ty MD [facility] BS AMB   12/8/2022  4:00 PM Tamika Ellis NP [facility] BS AMB       Requested Prescriptions     Pending Prescriptions Disp Refills    glipiZIDE (GLUCOTROL) 10 mg tablet 180 Tablet 0       Prior labs and Blood pressures:  BP Readings from Last 3 Encounters:   06/06/22 116/70   11/18/21 118/80   08/18/21 113/76     Lab Results   Component Value Date/Time    Sodium 137 06/16/2022 08:55 AM    Potassium 4.1 06/16/2022 08:55 AM    Chloride 103 06/16/2022 08:55 AM    CO2 26 06/16/2022 08:55 AM    Anion gap 8 06/16/2022 08:55 AM    Glucose 149 (H) 06/16/2022 08:55 AM    BUN 22 (H) 06/16/2022 08:55 AM    Creatinine 0.89 06/16/2022 08:55 AM    BUN/Creatinine ratio 25 (H) 06/16/2022 08:55 AM    GFR est AA >60 06/16/2022 08:55 AM    GFR est non-AA >60 06/16/2022 08:55 AM    Calcium 9.2 06/16/2022 08:55 AM

## 2022-08-18 RX ORDER — GLIPIZIDE 10 MG/1
TABLET ORAL
Qty: 180 TABLET | Refills: 1 | Status: SHIPPED | OUTPATIENT
Start: 2022-08-18 | End: 2022-10-26 | Stop reason: SDUPTHER

## 2022-08-27 DIAGNOSIS — E11.65 UNCONTROLLED TYPE 2 DIABETES MELLITUS WITH HYPERGLYCEMIA, WITHOUT LONG-TERM CURRENT USE OF INSULIN (HCC): ICD-10-CM

## 2022-08-29 RX ORDER — PEN NEEDLE, DIABETIC 31 GX5/16"
NEEDLE, DISPOSABLE MISCELLANEOUS
Qty: 100 EACH | Refills: 1 | Status: SHIPPED | OUTPATIENT
Start: 2022-08-29

## 2022-10-25 ENCOUNTER — TELEPHONE (OUTPATIENT)
Dept: ENDOCRINOLOGY | Age: 62
End: 2022-10-25

## 2022-10-26 ENCOUNTER — OFFICE VISIT (OUTPATIENT)
Dept: ENDOCRINOLOGY | Age: 62
End: 2022-10-26
Payer: COMMERCIAL

## 2022-10-26 VITALS
BODY MASS INDEX: 22.16 KG/M2 | SYSTOLIC BLOOD PRESSURE: 134 MMHG | DIASTOLIC BLOOD PRESSURE: 73 MMHG | WEIGHT: 141.2 LBS | HEIGHT: 67 IN | HEART RATE: 83 BPM

## 2022-10-26 DIAGNOSIS — E11.65 TYPE 2 DIABETES MELLITUS WITH HYPERGLYCEMIA, WITHOUT LONG-TERM CURRENT USE OF INSULIN (HCC): Primary | ICD-10-CM

## 2022-10-26 DIAGNOSIS — E11.65 UNCONTROLLED TYPE 2 DIABETES MELLITUS WITH HYPERGLYCEMIA, WITHOUT LONG-TERM CURRENT USE OF INSULIN (HCC): ICD-10-CM

## 2022-10-26 DIAGNOSIS — E11.65 UNCONTROLLED TYPE 2 DIABETES MELLITUS WITH HYPERGLYCEMIA (HCC): ICD-10-CM

## 2022-10-26 LAB — HBA1C MFR BLD HPLC: 8.2 %

## 2022-10-26 PROCEDURE — 83036 HEMOGLOBIN GLYCOSYLATED A1C: CPT | Performed by: GENERAL ACUTE CARE HOSPITAL

## 2022-10-26 PROCEDURE — 99204 OFFICE O/P NEW MOD 45 MIN: CPT | Performed by: GENERAL ACUTE CARE HOSPITAL

## 2022-10-26 PROCEDURE — 3052F HG A1C>EQUAL 8.0%<EQUAL 9.0%: CPT | Performed by: GENERAL ACUTE CARE HOSPITAL

## 2022-10-26 PROCEDURE — 3078F DIAST BP <80 MM HG: CPT | Performed by: GENERAL ACUTE CARE HOSPITAL

## 2022-10-26 PROCEDURE — 3074F SYST BP LT 130 MM HG: CPT | Performed by: GENERAL ACUTE CARE HOSPITAL

## 2022-10-26 RX ORDER — METFORMIN HYDROCHLORIDE 500 MG/1
500 TABLET, EXTENDED RELEASE ORAL
Qty: 360 TABLET | Refills: 2 | Status: SHIPPED | OUTPATIENT
Start: 2022-10-26

## 2022-10-26 RX ORDER — DULAGLUTIDE 0.75 MG/.5ML
0.75 INJECTION, SOLUTION SUBCUTANEOUS
Qty: 4 EACH | Refills: 0 | Status: SHIPPED | OUTPATIENT
Start: 2022-10-26

## 2022-10-26 RX ORDER — GLIPIZIDE 10 MG/1
TABLET ORAL
Qty: 180 TABLET | Refills: 2 | Status: SHIPPED | OUTPATIENT
Start: 2022-10-26

## 2022-10-26 RX ORDER — METFORMIN HYDROCHLORIDE 500 MG/1
500 TABLET, EXTENDED RELEASE ORAL
Qty: 120 TABLET | Refills: 5 | Status: SHIPPED | OUTPATIENT
Start: 2022-10-26 | End: 2022-10-26

## 2022-10-26 RX ORDER — DULAGLUTIDE 1.5 MG/.5ML
1.5 INJECTION, SOLUTION SUBCUTANEOUS
Qty: 2 EACH | Refills: 3 | Status: SHIPPED | OUTPATIENT
Start: 2022-10-26

## 2022-10-26 NOTE — PROGRESS NOTES
REFERRED BY: Teresa CORDERO NP     REASON:  Uncontrolled type 2 diabetes mellitus    CHIEF COMPLAINT: evaluation of type 2 diabetes mellitus    HISTORY OF PRESENT ILLNESS:   Sakshi Arana is a 57 y.o. female with a PMHx as noted below who presents for evaluation of uncontrolled type 2 diabetes.     PMH: uncontrolled type 2 diabetes, hyperlipidemia, hypertension  Was following with PCP     Diabetes History:  Diabetes was diagnosed: 10 yrs  Family History of diabetes is Positive  grandmother DM2  Hb A1c : 8.2%  10/26/2022  8.4%  06/2022      Diabetes-related Hospitalizations:denies      Current Home Regimen:  Jardiance 25mg daily  Victoza 0.6mg daily  Glipizide 10mg BID    Review of home glucose:  -140  Lunch 120  Dinner 100-200s    Hypoglycemia:no    Diet:  -3 meals  Breakfast = sausage and eggs, small pancake  Lunch = sandwich, fruit cup  Dinner = eats everything, chicken pork chops mashed fried potatoes, corn, green beans, sweet peas, canned greens, spinach  Snacks = potato chips, popcorn, occass ice cream sugar free, candy  Rafaela = flav water, diet soda, iced/hot tea sweetner, coffee sweetener, creamer,     Physical Activity:  -moves a lot at work, works as manager at [employer]    Complications:  MI or CVA:No  PVD: No  Retinopathy:No     Last Ophthalm:diabetic eye exam [facility] center dr Meaghan Tang 06/2022  Nephropathy:No  Neuropathy:No      Last Podiatry:none  Gastropathy: No  Amputations: No      On a Statin:Yes pravastin 10mg   On an ACEI/ARB:Yes  On Aspirin:No  Smoker:No    Diabetes Education: not done    Review of most recent diabetes-related labs:  Lab Results   Component Value Date    HBA1C 8.4 (H) 06/16/2022    HBA1C 8.0 (H) 11/18/2021    HBA1C 7.2 (H) 05/18/2021    KNP5FXZQ 7.8 01/27/2020    TDB1XBFU 7.7 10/01/2019    TZR5BWWA 8.2 06/20/2019    CHOL 133 06/16/2022    LDLC 56.8 06/16/2022    GFRAA >60 06/16/2022    GFRNA >60 06/16/2022    MCACR 11 06/16/2022    TSH 0.83 11/18/2021    VITD3 39.0 11/20/2020     Lab Key:  [id number] = IA-2 pancreatic islet cell autoantibody  CPEPL = C-peptide level  :EXT = External Lab  GADLT = KATHLEEN-65 autoantibody   INSUL = Insulin level  MCACR (or MALBEXT) = Urine Microalbumin (or External UM)  B12LT = B12 level    PAST MEDICAL/SURGICAL HISTORY:   Past Medical History:   Diagnosis Date    Abnormal mammogram of right breast 7/1/16    [facility] -calcifications--bx planned    Advance directive discussed with patient 12/08/2015,06/22/2016    Diabetes ([address]     Encounter for surgical counseling     7/28/16 note from Dr Jayjay Najera    H/O breast biopsy 7/12/16     atypical ductal hyperplasia & calcifications    Herpes zoster 1/3/16    [facility] Azar PF notes area on right shoulder    Hypertension     Pancreas inflammation 1/09    Sternoclavicular joint strain 10/31/2016    [facility] PF note  [facility]u Willean Satchel 12/27/16 f/u    Toe pain 04/04/2017    Josefina FARFAN note onychomycosis    Vitamin D deficiency      Past Surgical History:   Procedure Laterality Date    ENDOSCOPY, COLON, DIAGNOSTIC      dec 09 staples mill    HX BREAST BIOPSY Right 7/12/16    [facility] Imaging Radiologist    HX CARPAL TUNNEL RELEASE  10/2011       ALLERGIES:   Allergies   Allergen Reactions    Trazodone Other (comments)     Hallucinations       MEDICATIONS ON ADMISSION:     Current Outpatient Medications:     dulaglutide (Trulicity) 6.66 CV/9.4 mL sub-q pen, 0.5 mL by SubCUTAneous route every seven (7) days. After finishing 4 week of the 0.75mg increase to 1.5mg weekly, Disp: 4 Each, Rfl: 0    Trulicity 1.5 QI/3.0 mL sub-q pen, 0.5 mL by SubCUTAneous route every seven (7) days. Once completed 0.75mg for 4 weeks take the 1.5mg weekly dose, Disp: 2 Each, Rfl: 3    empagliflozin (Jardiance) 25 mg tablet, TAKE 1 TABLET BY MOUTH EVERY DAY, Disp: 90 Tablet, Rfl: 2    metFORMIN ER (GLUCOPHAGE XR) 500 mg tablet, Take 1 Tablet by mouth daily (with dinner). , Disp: 360 Tablet, Rfl: 2    glipiZIDE (GLUCOTROL) 10 mg tablet, TAKE 1 TABLET BY MOUTH TWICE A DAY, Disp: 180 Tablet, Rfl: 2    BD Ultra-Fine Short Pen Needle 31 gauge x 5/16\" ndle, USE DAILY WITH VICTOZA PEN., Disp: 100 Each, Rfl: 1    enalapril (VASOTEC) 10 mg tablet, TAKE 1 TABLET BY MOUTH EVERY DAY, Disp: 90 Tablet, Rfl: 1    triamterene-hydroCHLOROthiazide (MAXZIDE) 37.5-25 mg per tablet, TAKE 1 TABLET BY MOUTH EVERY DAY, Disp: 90 Tablet, Rfl: 1    pravastatin (PRAVACHOL) 10 mg tablet, TAKE 1 TABLET BY MOUTH EVERYDAY AT BEDTIME, Disp: 90 Tablet, Rfl: 1    diphenhydrAMINE (BENADRYL) 25 mg tablet, Take 50 mg by mouth every six (6) hours as needed. , Disp: , Rfl:     cholecalciferol (VITAMIN D3) 25 mcg (1,000 unit) cap, Take 1,000 Units by mouth daily. , Disp: , Rfl:     fluticasone (FLONASE) 50 mcg/actuation nasal spray, 2 sprays per nostril daily (Patient taking differently: as needed. 2 sprays per nostril daily), Disp: 1 Bottle, Rfl: 0    OTHER, Glucometer test strips-test 2-3 times per week, Disp: 90 Strip, Rfl: 3    Lactobac no.41/Bifidobact no.7 (PROBIOTIC-10 PO), Take  by mouth.  (Patient not taking: Reported on 10/26/2022), Disp: , Rfl:     SOCIAL HISTORY:   Social History     Socioeconomic History    Marital status:      Spouse name: Not on file    Number of children: Not on file    Years of education: Not on file    Highest education level: Not on file   Occupational History    Not on file   Tobacco Use    Smoking status: Never    Smokeless tobacco: Never   Vaping Use    Vaping Use: Never used   Substance and Sexual Activity    Alcohol use: No    Drug use: No    Sexual activity: Yes     Partners: Male   Other Topics Concern    Not on file   Social History Narrative    Not on file     Social Determinants of Health     Financial Resource Strain: Low Risk     Difficulty of Paying Living Expenses: Not hard at all   Food Insecurity: No Food Insecurity    Worried About Running Out of Food in the Last Year: Never true    Ran Out of Food in the Last Year: Never true   Transportation Needs: Not on file   Physical Activity: Not on file   Stress: Not on file   Social Connections: Not on file   Intimate Partner Violence: Not on file   Housing Stability: Not on file       FAMILY HISTORY:  Family History   Problem Relation Age of Onset    Hypertension Mother     Diabetes Mother     Heart Disease Mother     Gall Bladder Disease Father     Hypertension Father     Heart Disease Brother         mi at age 27, while playing racketball       REVIEW OF SYSTEMS: Complete ROS assessed and noted for that which is described above, all else are negative.     CONSTITUTIONAL: no fevers, chills, weight loss  EYES: no blurry vision or double vision  CARDIOVASCULAR: no chest pain or palpitations  RESPIRATORY: no cough or shortness of breath  GASTROINTESTINAL: no dysphagia or abdominal pain  MUSCULOSKELETAL: no joint pains or weakness  SKIN: no rashes  NEUROLOGICAL: no numbness, tingling, or headaches  PSYCHIATRIC: no depression or anxiety  ENDOCRINE: no heat or cold intolerance, no polyuria or polydipsia      PHYSICAL EXAMINATION:  VITAL SIGNS:  Visit Vitals  /73   Pulse 83   Ht 5' 7\" (1.702 m)   Wt 141 lb 3.2 oz (64 kg)   LMP 05/31/2010   BMI 22.12 kg/m²     Last 3 Recorded Weights in this Encounter    10/26/22 0938   Weight: 141 lb 3.2 oz (64 kg)        GENERAL: NCAT, Sitting comfortably, NAD  EYES: EOMI, non-icteric, no proptosis  Ear/Nose/Throat: NCAT, no inflammation, no masses  LYMPH NODES: No LAD  CARDIOVASCULAR: S1 S2, RRR, No murmur, 2+ radial pulses  RESPIRATORY: CTA b/l, no wheeze/rales  GASTROINTESTINAL: NT, ND  MUSCULOSKELETAL: Normal ROM, no atrophy  SKIN: warm, no edema/rash/ or other skin changes  NEUROLOGIC: 5/5 power all extremities, no tremor, AAOx3  PSYCHIATRIC: Normal affect, Normal insight and judgement    Diabetic foot exam:     Left Foot:   Visual Exam: normal    Pulse DP: 2+ (normal)   Filament test: normal sensation    Vibratory sensation:  normal       Right Foot:   Visual Exam: normal Pulse DP: 2+ (normal)   Filament test: normal sensation    Vibratory sensation: normal      REVIEW OF LABORATORY AND RADIOLOGY DATA:   Labs and documentation have been reviewed as described above. ASSESSMENT AND PLAN:   Cory Carmona is a 57 y.o. female with a PMHx as noted above who presents for evaluation of uncontrolled type 2 diabetes. Problems:  Type 2 diabetes Uncontrolled  Hyperlipidemia  Hypertension    We had the pleasure of reviewing together the basics of diabetes including basic pathophysiology and diabetes care. We further discussed the importance of checking home glucose regularly and takin all of their scheduled medications in order to have the best possible outcome. I was able to answer any questions they had in clinic today and they are invited to reach me if they have any further questions. Based upon our discussion together today we have decided to make the following changes:    Patient is worried about more weight loss, so will limit the dose of Trulicity, however the next step if her BS is not controlled will be daily long acting insulin which she hopes to avoid. She has been going through a lot of stress as her father  2 weeks ago and she left work for 2 weeks and returend on Mon. Her diet includes eating a lot of potatoes and peanut butter and fruit cups and we discussed lifestyle modifications and timing of meals    PLAN  Type 2 Diabetes  Medications: Switch to metformin ER 1000mg twice daily (take it with breakfast and dinner)  Switch from Victoza 2.0XD daily to Trulicity 8.87JJ weekly and increase to 1.5mg weekly after 4 weeks  Continue on jardiance 25mg daily and [id number] 30 min before breakfast and dinner    Advised to check glucose 2x/day  Referred for DM education    HTN: BP stable on current medications, no changes today.   HLD: Fasting lipids reviewed, cont pravastatin 10mg daily  Lab Results   Component Value Date/Time    Cholesterol, total 133 2022 08:55 AM HDL Cholesterol 63 06/16/2022 08:55 AM    LDL, calculated 56.8 06/16/2022 08:55 AM    VLDL, calculated 13.2 06/16/2022 08:55 AM    Triglyceride 66 06/16/2022 08:55 AM    CHOL/HDL Ratio 2.1 06/16/2022 08:55 AM        We discussed the expected course, resolution and complications of the diagnosis(es) in detail. Medication risks, benefits, costs, interactions, and alternatives were discussed as indicated. I advised Zain Martins to contact the office if her condition worsens, changes or fails to improve as anticipated. Patient expressed understanding with the diagnosis(es) and plan. RTC 3 months    Kieran Quigley MD   [facility] End[facility]    Please see patient instructions.

## 2022-10-26 NOTE — LETTER
10/26/2022    Patient: Dayna Cosme   YOB: 1960   Date of Visit: 10/26/2022     Rachele Mendieta NP  [address]  [address]  Via In Basket    Dear Rachele Mendieta NP,      Thank you for referring Ms. Dayna Cosme to [facility] for evaluation. My notes for this consultation are attached. If you have questions, please do not hesitate to call me. I look forward to following your patient along with you.       Sincerely,    Kristine Reveles MD

## 2022-10-26 NOTE — PATIENT INSTRUCTIONS
Diabetes Education referral made: Call them for Appt  [facility] for [address] [facility], [address] Kathleen Lock29  Phone [phone]  Fax [phone]     Plan to repeat your diabetes eye exam in the near future. Please be sure to have the eye clinic / office fax us the report of your latest eye exam to our clinic to fax # [phone]. This is very important for us to keep track of any effect of diabetes on your vision as part of our wholesome diabetes care that we provide. INFORMATION FOR NEW DIABETES PATIENTS NOT ON INSULIN:    I would like to welcome you to our Diabetes & Endocrinology clinic. We want to do our best to help you take the best care of your diabetes. I would like for you to read this fact sheet which will have some important information for you regarding your treatment. What is my HbA1c (hemoglobin A1c) ? Blood sugar is very sticky and if left elevated for long enough, it will stick to just about everything in your body. This includes enzymes which can no longer function properly and the lining of your blood vessels which can get damaged and result in damaged organs. It also sticks to your hemoglobin when your red blood cells are being produced and measuring this can provide us with a good idea of what your blood sugar average has been over the past 3 months. Most of the time we aim for a HbA1c value of 7% but this can be different for certain people under certain circumstances. Why do I need to keep a glucose log ? It is not possible to properly make changes to your insulin dose unless we know what your glucose values are at home. Using your HbA1c we can only have a general idea of whether your glucose has been controlled or uncontrolled, but it will not inform us on your day-to-day and [id number]-[id number] blood sugar control.  If you present to clinic without your glucose log, you may be wasting your visit rather than having a more meaningful visit since medication adjustments will be limited. What other things should I do to always be prepared ? Glucose tablets. Thats right, if you are on a medication that can potentially cause low blood sugars, you need to be prepared just in case since it can cause you to have very uncomfortable symptoms. Generally it is a good idea to keep some in your car, in your bedroom, and at work/school just in case. If your not sure if your diabetes medication can cause low blood sugar, be sure to ask your doctor. Diabetes ID. It is important for others to know that you are diabetic (especially if you are using in insulin) in case for some reason you are not able to communicate with others. This can happen if you pass out due to severely low blood sugar for example. IDs come as bracelets, necklaces, and dog tags. Check with your pharmacy about obtaining an ID and wear it wherever you go. I look forward to working with you,    Estrellita An MD   [address] [place] [address]    . ............................................................................................................................................ PLAN FOR TODAY    We will plan to make the following changes to your diabetes medications:  Switch to metformin ER 1000mg twice daily (take it with breakfast and dinner)  Switch from Victoza 7.9QW daily to Trulicity 6.50FD weekly and increase to 1.5mg weekly after 4 weeks  Continue on jardiance 25mg daily and [id number] 30 min before breakfast and dinner    It will be important to continue checking your glucose just as you did previously. I would like you at the very least to check you glucose during:     + AM fasting before breakfast   + Dinner time   + Bedtime   + Any other time that you are not feeling well. Always provide a glucose log that is completed at every visit so that we can review the results of your home glucose together.  Without this, it is not possible to make accurate changes to your insulin doses. Diabetes and Meal Planning    Meal planning can be a key part of managing diabetes. Planning meals and snacks with the right balance of carbohydrate, protein, and fat can help you keep your blood sugar at the target level. You don't have to eat special foods. You can eat what your family eats, including sweets once in a while. But you do have to pay attention to how often you eat and how much you eat of certain foods. Your plate  The plate format is a simple way to help you manage how you eat. You plan meals by learning how much space each food should take on a plate. It can make it easier to keep your blood sugar level within your target range. It also helps you see if you're eating healthy portion sizes. To use the plate format, you put non-starchy vegetables on half your plate. Add lean protein foods, such as fish, lean meats and poultry, or soy products, on one-quarter of the plate. Put a grain or starchy vegetable (such as brown rice or a potato) on the final quarter of the plate. You can add a small piece of fruit and some low-fat or fat-free milk or yogurt, depending on your carbohydrate goal for each meal.  Make sure that you are not using an oversized plate. A 9-inch plate is best.    Carbohydrates  Carbohydrate raises blood sugar higher and more quickly than any other nutrient. It is found in desserts, breads and cereals, and fruit. It's also found in starchy vegetables such as potatoes and corn, grains such as rice and pasta, and milk and yogurt. You can help keep your blood sugar levels within your target range by planning how much carbohydrate to have at meals and snacks. The amount you need depends on several things. These include your weight, how active you are, which diabetes medicines you take, and what your goals are for your blood sugar levels.    An example of a carbohydrate counting plan is:  45 to 60 grams at each meal. That's about the same as 3 to 4 carbohydrate servings. 15 to 20 grams at each snack. That's about the same as 1 carbohydrate serving. The Nutrition Facts label on packaged foods tells you how much carbohydrate is in a serving of the food. First, look at the serving size on the food label. All of the nutrition information on a food label is based on that serving size. For foods that don't come with labels, such as fresh fruits and vegetables, you'll need a guide that lists carbohydrate in these foods. You may use an yari on your smart phone called [employer]. How can you plan healthy meals? Here are some tips to get started:  Plan your meals a week at a time. Don't forget to include snacks too. Use cookbooks or online recipes to plan several main meals. Plan some quick meals for busy nights. You also can double some recipes that freeze well. Then you can save half for other busy nights when you don't have time to cook. Make sure you have the ingredients you need for your recipes. If you're running low on basic items, put these items on your shopping list too. List foods that you use to make breakfasts, lunches, and snacks. List plenty of fruits and vegetables. Post this list on the refrigerator. Add to it as you think of more things you need. Take the list to the store to do your weekly shopping. Follow-up care is a key part of your treatment and safety. Be sure to make and go to all appointments, and call your doctor if you are having problems. It's also a good idea to know your test results and keep a list of the medicines you take.    --------------------------------------------------------------------------------------------------------------------------------------------------------------------------------  Diabetes Dental Care  When you have diabetes, managing blood sugar levels and taking good care of your teeth and gums are both important.  When blood sugar levels are high, there's a greater risk for Gum (periodontal) disease. Tooth decay. Fungal infections in the mouth, like thrush. Dry mouth. Keeping your blood sugar levels in your target range can help prevent problems with the teeth and gums. If you have any problems with your teeth or gums, it is important see your dentist.  How do you care for your teeth and gums when you have diabetes? Brush your teeth twice a day. Floss daily. Make sure to press the floss against your teeth and not your gums. Check each day for areas where your gums might be red or painful. Be sure to let your dentist know of any sores in your mouth. See your dentist regularly for professional cleaning of your teeth and to look for gum problems. Many dentists recommend getting checkups twice a year. Remind your dentist that you have diabetes before any work is done. Don't smoke or use smokeless tobacco.    --------------------------------------------------------------------------------------------------------------------------------------------------------------------------------  Diabetes Foot Care    When you have diabetes, your feet need extra care and attention. Diabetes can damage the nerve endings and blood vessels in your feet, making you less likely to notice when your feet are injured. Diabetes also limits your body's ability to fight infection. If you get a minor foot injury, it could become an ulcer or a serious infection. With good foot care, you can prevent most of these problems. Caring for your feet can be quick and easy. Most of the care can be done when you are bathing or getting ready for bed. Keep your blood sugar close to normal by watching what and how much you eat, monitoring blood sugar, taking medicines if prescribed, and getting regular exercise. Do not smoke. Smoking affects blood flow and can make foot problems worse. Eat a diet that is low in fats. High fat intake can cause fat to build up in your blood vessels and decrease blood flow.   Inspect your feet daily for blisters, cuts, cracks, or sores. If you cannot see well, use a mirror or have someone help you. Take care of your feet:  Wash your feet every day. Use warm (not hot) water. Check the water temperature with your wrists or other part of your body, not your feet. Dry your feet well. If the skin on your feet stays moist, bacteria or a fungus can grow, which can lead to infection. Use moisturizing skin cream to keep the skin on your feet soft and prevent calluses and cracks. Stop using any cream that causes a rash. Clean underneath your toenails carefully. Do not use a sharp object to clean underneath your toenails. Change socks daily. Look inside your shoes every day for things like gravel or torn linings, which could cause blisters or sores. Buy shoes that fit well but not too tightly to prevent bunions and blisters. Shoes should be flexible and breathable but prevent from injury. Do not go barefoot, especially at night, to prevent injury. Do not try to treat an early foot problem at home. Home remedies or treatments that you can buy without a prescription (such as corn removers) can be harmful. Seek immediate help if:   You have a foot sore, an ulcer or break in the skin that is not healing after 4 days, bleeding corns or calluses, or an ingrown toenail. You have blue or black areas. You have peeling skin or tiny blisters between your toes or cracking or oozing of the skin. You have a fever for more than 24 hours and a foot sore. You have new numbness or tingling in your feet that does not go away after you move your feet or change positions. You have new unexplained or unusual swelling of the foot or ankle.

## 2022-12-29 ENCOUNTER — TELEPHONE (OUTPATIENT)
Dept: ENDOCRINOLOGY | Age: 62
End: 2022-12-29

## 2022-12-29 DIAGNOSIS — E11.65 TYPE 2 DIABETES MELLITUS WITH HYPERGLYCEMIA, WITHOUT LONG-TERM CURRENT USE OF INSULIN (HCC): ICD-10-CM

## 2022-12-29 RX ORDER — METFORMIN HYDROCHLORIDE 500 MG/1
1000 TABLET, EXTENDED RELEASE ORAL 2 TIMES DAILY
Qty: 360 TABLET | Refills: 2 | Status: SHIPPED | OUTPATIENT
Start: 2022-12-29

## 2022-12-29 RX ORDER — DULAGLUTIDE 0.75 MG/.5ML
INJECTION, SOLUTION SUBCUTANEOUS
OUTPATIENT
Start: 2022-12-29

## 2022-12-29 NOTE — TELEPHONE ENCOUNTER
I spoke with Ms. Bao Patino and she stated that she thought she was suppose to have two Trulicity pens. I informed her that Dr. Minh Lr did send in Two different pens however the Trulicity 9.56 mg she was suppose to take for 4 weeks and then she was to increase it to the Trulicity 1.5 mg pen to take weekly. Patient voiced understanding. She stated she has one more dose left of the Trulicity 4.93 mg. Patient stated that she is concerned because her sugar that she only checks 2-3 times a week and it has been high the past two days. She stated that on yesterday it was 301 and today after eating breakfast it was 410  three hours later. I read her patient instructions to her as noted at her appointment on 10/26/22 and she stated that she has only been taking her Metformin 500mg  once daily because that is what the instructions stated  on her bottle. She stated she waited until she finished her Victoza before starting the Trulicity. She takes the jardiance nd glipizide as noted. Patient would like to know what she should do?         The notes below are the instructions that were given to the patient on 10/26/22    We will plan to make the following changes to your diabetes medications:  Switch to metformin ER 1000mg twice daily (take it with breakfast and dinner)  Switch from Victoza 4.2ZM daily to Trulicity 4.23EC weekly and increase to 1.5mg weekly after 4 weeks  Continue on jardiance 25mg daily and [id number] 30 min before breakfast and dinner

## 2022-12-29 NOTE — TELEPHONE ENCOUNTER
Spoke with Ms. Blanca Gonzalez blood sugar number was 280 and increased to 400 today, in the last few weeks its been running high 100s to low 200s, she has been taking metformin once daily and Trulicity 2.55 mg she took the last dose yesterday, she has caught COVID on December 24 and has been using a large amount Mucinex and over-the-counter medications to help with the her symptoms and she is recovering, advised her to take a shot of Trulicity today 5.22 mg [to make a total of 1.5 mg dose] and increase her metformin to 4 tablets a day and if her blood sugar numbers do not improve in the coming days she will let us know, all of Ms. Patel's questions were answered and she indicates understanding and agrees with plan and she has recited back the plan

## 2022-12-29 NOTE — TELEPHONE ENCOUNTER
Pt called and LVM 12/29 @ 12:05 pm.    Pt was looking at her medication list. She said she is supposed to be on two trulisitys. When she went to get her medications at the pharmacy she only got one. She would like to talk to the nurse to find out what is going on. Her sugar was 411 this morning.     Pt# [phone]

## 2023-01-26 ENCOUNTER — OFFICE VISIT (OUTPATIENT)
Dept: ENDOCRINOLOGY | Age: 63
End: 2023-01-26
Payer: COMMERCIAL

## 2023-01-26 VITALS
DIASTOLIC BLOOD PRESSURE: 79 MMHG | BODY MASS INDEX: 21.22 KG/M2 | SYSTOLIC BLOOD PRESSURE: 134 MMHG | HEIGHT: 67 IN | HEART RATE: 83 BPM | WEIGHT: 135.2 LBS

## 2023-01-26 DIAGNOSIS — E11.65 TYPE 2 DIABETES MELLITUS WITH HYPERGLYCEMIA, WITHOUT LONG-TERM CURRENT USE OF INSULIN (HCC): Primary | ICD-10-CM

## 2023-01-26 DIAGNOSIS — E11.69 HYPERLIPIDEMIA ASSOCIATED WITH TYPE 2 DIABETES MELLITUS (HCC): ICD-10-CM

## 2023-01-26 DIAGNOSIS — E78.5 HYPERLIPIDEMIA ASSOCIATED WITH TYPE 2 DIABETES MELLITUS (HCC): ICD-10-CM

## 2023-01-26 LAB — HBA1C MFR BLD HPLC: 9.3 %

## 2023-01-26 RX ORDER — PEN NEEDLE, DIABETIC 32GX 5/32"
NEEDLE, DISPOSABLE MISCELLANEOUS
Qty: 50 PEN NEEDLE | Refills: 5 | Status: SHIPPED | OUTPATIENT
Start: 2023-01-26

## 2023-01-26 RX ORDER — DULAGLUTIDE 1.5 MG/.5ML
1.5 INJECTION, SOLUTION SUBCUTANEOUS
Qty: 2 EACH | Refills: 3 | Status: SHIPPED | OUTPATIENT
Start: 2023-01-26

## 2023-01-26 RX ORDER — GLIPIZIDE 10 MG/1
20 TABLET, FILM COATED, EXTENDED RELEASE ORAL DAILY
Qty: 180 TABLET | Refills: 3 | Status: SHIPPED | OUTPATIENT
Start: 2023-01-26

## 2023-01-26 RX ORDER — INSULIN DETEMIR 100 [IU]/ML
INJECTION, SOLUTION SUBCUTANEOUS
Qty: 15 ML | Refills: 5 | Status: SHIPPED | OUTPATIENT
Start: 2023-01-26

## 2023-01-26 NOTE — LETTER
1/26/2023    Patient: Crystal Medina   YOB: 1960   Date of Visit: 1/26/2023     Beatris Oden NP  [address]  Via In Basket    Dear Beatris Oden NP,      Thank you for referring Ms. Crystal Medina to [facility] for evaluation. My notes for this consultation are attached. If you have questions, please do not hesitate to call me. I look forward to following your patient along with you.       Sincerely,    Madelyn Ruiz MD

## 2023-01-26 NOTE — PROGRESS NOTES
REFERRED BY: Hanny CORDERO NP     REASON:  Uncontrolled type 2 diabetes mellitus    CHIEF COMPLAINT: evaluation of type 2 diabetes mellitus    HISTORY OF PRESENT ILLNESS:   Laura Natarajan is a 57 y.o. female with a PMHx as noted below who presents for evaluation of uncontrolled type 2 diabetes. PMH: uncontrolled type 2 diabetes, hyperlipidemia, hypertension  Was following with PCP     Developed covid 19 for 2 weeks in mid 12/2022 and her sugars at that time went 400s. No steroids given. 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 Otherwise she is unsure why her BS control has not been optimum.     A1c worsened from 8.2 to 9.3%        Diabetes History:  Diabetes was diagnosed: 10 yrs  Family History of diabetes is Positive  grandmother DM2  Hb A1c : 8.2%  10/26/2022  8.4%  06/2022      Diabetes-related Hospitalizations:denies      Current Home Regimen:  Jardiance 82EQ daily  Trulicity 7.3KN weekly  Glipizide 10mg BID  MTF ER 1g BID    Review of home glucose:  As above    Hypoglycemia:no    Diet:  -3 meals  Breakfast = sausage and eggs, small pancake  Lunch = sandwich, fruit cup  Dinner = eats everything, chicken pork chops mashed fried potatoes, corn, green beans, sweet peas, canned greens, spinach  Snacks = potato chips, popcorn, occass ice cream sugar free, candy  Rafaela = flav water, diet soda, iced/hot tea sweetner, coffee sweetener, creamer,     Physical Activity:  -moves a lot at work, works as manager at [employer]    Complications:  MI or CVA:No  PVD: No  Retinopathy:No     Last Ophthalm:diabetic eye exam [facility] dr Lizette Alexander 06/2022, annual visit  Nephropathy:No  Neuropathy:No      Last Podiatry:none  Gastropathy: No  Amputations: No      On a Statin:Yes pravastin 10mg   On an ACEI/ARB:Yes  On Aspirin:No  Smoker:No    Diabetes Education: not done    Review of most recent diabetes-related labs:  Lab Results   Component Value Date    HBA1C 8.4 (H) 06/16/2022    HBA1C 8.0 (H) 11/18/2021 HBA1C 7.2 (H) 05/18/2021    RJY0KKBV 9.3 01/26/2023    LKQ4XTGL 8.2 10/26/2022    XSY7PRBD 7.8 01/27/2020    CHOL 133 06/16/2022    LDLC 56.8 06/16/2022    GFRAA >60 06/16/2022    GFRNA >60 06/16/2022    MCACR 11 06/16/2022    TSH 0.83 11/18/2021    VITD3 39.0 11/20/2020     Lab Key:  [id number] = IA-2 pancreatic islet cell autoantibody  CPEPL = C-peptide level  :EXT = External Lab  GADLT = KATHLEEN-65 autoantibody   INSUL = Insulin level  MCACR (or MALBEXT) = Urine Microalbumin (or External UM)  B12LT = B12 level    PAST MEDICAL/SURGICAL HISTORY:   Past Medical History:   Diagnosis Date    Abnormal mammogram of right breast 7/1/16    [facility] -calcifications--bx planned    Advance directive discussed with patient 12/08/2015,06/22/2016    Diabetes ([facility] [address]     Encounter for surgical counseling     7/28/16 note from Dr Av Ariza    H/O breast biopsy 7/12/16     atypical ductal hyperplasia & calcifications    Herpes zoster 1/3/16    Jennifer Victor PF notes area on right shoulder    Hypertension     Pancreas inflammation 1/09    Sternoclavicular joint strain 10/31/2016    [facility] note  [facility] f/u Grayce Alamin 12/27/16 f/u    Toe pain 04/04/2017    Jennifer Victor PF note onychomycosis    Vitamin D deficiency      Past Surgical History:   Procedure Laterality Date    ENDOSCOPY, COLON, DIAGNOSTIC      dec 09 [facility]    HX BREAST BIOPSY Right 7/12/16    [facility] Imaging Radiologist    HX CARPAL TUNNEL RELEASE  10/2011       ALLERGIES:   Allergies   Allergen Reactions    Trazodone Other (comments)     Hallucinations       MEDICATIONS ON ADMISSION:     Current Outpatient Medications:     pravastatin (PRAVACHOL) 10 mg tablet, TAKE 1 TABLET BY MOUTH EVERYDAY AT BEDTIME, Disp: 90 Tablet, Rfl: 0    triamterene-hydroCHLOROthiazide (MAXZIDE) 37.5-25 mg per tablet, TAKE 1 TABLET BY MOUTH EVERY DAY, Disp: 90 Tablet, Rfl: 0    enalapril (VASOTEC) 10 mg tablet, TAKE 1 TABLET BY MOUTH EVERY DAY, Disp: 90 Tablet, Rfl: 0    metFORMIN ER (GLUCOPHAGE XR) 500 mg tablet, Take 2 Tablets by mouth two (2) times a day., Disp: 360 Tablet, Rfl: 2    Trulicity 1.5 HM/2.5 mL sub-q pen, 0.5 mL by SubCUTAneous route every seven (7) days. Once completed 0.75mg for 4 weeks take the 1.5mg weekly dose, Disp: 2 Each, Rfl: 3    empagliflozin (Jardiance) 25 mg tablet, TAKE 1 TABLET BY MOUTH EVERY DAY, Disp: 90 Tablet, Rfl: 2    glipiZIDE (GLUCOTROL) 10 mg tablet, TAKE 1 TABLET BY MOUTH TWICE A DAY, Disp: 180 Tablet, Rfl: 2    BD Ultra-Fine Short Pen Needle 31 gauge x 5/16\" ndle, USE DAILY WITH VICTOZA PEN., Disp: 100 Each, Rfl: 1    diphenhydrAMINE (BENADRYL) 25 mg tablet, Take 50 mg by mouth every six (6) hours as needed. , Disp: , Rfl:     cholecalciferol (VITAMIN D3) 25 mcg (1,000 unit) cap, Take 1,000 Units by mouth daily. , Disp: , Rfl:     fluticasone (FLONASE) 50 mcg/actuation nasal spray, 2 sprays per nostril daily (Patient taking differently: as needed. 2 sprays per nostril daily), Disp: 1 Bottle, Rfl: 0    OTHER, Glucometer test strips-test 2-3 times per week, Disp: 90 Strip, Rfl: 3    dulaglutide (Trulicity) 2.21 FI/3.3 mL sub-q pen, 0.5 mL by SubCUTAneous route every seven (7) days.  After finishing 4 week of the 0.75mg increase to 1.5mg weekly (Patient not taking: Reported on 1/26/2023), Disp: 4 Each, Rfl: 0    SOCIAL HISTORY:   Social History     Socioeconomic History    Marital status:      Spouse name: Not on file    Number of children: Not on file    Years of education: Not on file    Highest education level: Not on file   Occupational History    Not on file   Tobacco Use    Smoking status: Never    Smokeless tobacco: Never   Vaping Use    Vaping Use: Never used   Substance and Sexual Activity    Alcohol use: No    Drug use: No    Sexual activity: Yes     Partners: Male   Other Topics Concern    Not on file   Social History Narrative    Not on file     Social Determinants of Health     Financial Resource Strain: Low Risk     Difficulty of Paying Living Expenses: Not hard at all   Food Insecurity: No Food Insecurity    Worried About Running Out of Food in the Last Year: Never true    Ran Out of Food in the Last Year: Never true   Transportation Needs: Not on file   Physical Activity: Not on file   Stress: Not on file   Social Connections: Not on file   Intimate Partner Violence: Not on file   Housing Stability: Not on file       FAMILY HISTORY:  Family History   Problem Relation Age of Onset    Hypertension Mother     Diabetes Mother     Heart Disease Mother     Lamberto Momin Bladder Disease Father     Hypertension Father     Heart Disease Brother         mi at age 27, while playing racketball       REVIEW OF SYSTEMS: Complete ROS assessed and noted for that which is described above, all else are negative.     CONSTITUTIONAL: no fevers, chills, weight loss  EYES: no blurry vision or double vision  CARDIOVASCULAR: no chest pain or palpitations  RESPIRATORY: no cough or shortness of breath  GASTROINTESTINAL: no dysphagia or abdominal pain  MUSCULOSKELETAL: no joint pains or weakness  SKIN: no rashes  NEUROLOGICAL: no numbness, tingling, or headaches  PSYCHIATRIC: no depression or anxiety  ENDOCRINE: no heat or cold intolerance, no polyuria or polydipsia      PHYSICAL EXAMINATION:  VITAL SIGNS:  Visit Vitals  /79   Pulse 83   Ht 5' 7\" (1.702 m)   Wt 135 lb 3.2 oz (61.3 kg)   LMP 05/31/2010   BMI 21.18 kg/m²     Last 3 Recorded Weights in this Encounter    01/26/23 0923   Weight: 135 lb 3.2 oz (61.3 kg)        GENERAL: NCAT, Sitting comfortably, NAD  EYES: EOMI, non-icteric, no proptosis  Ear/Nose/Throat: NCAT, no inflammation, no masses  LYMPH NODES: No LAD  CARDIOVASCULAR: S1 S2, RRR, No murmur, 2+ radial pulses  RESPIRATORY: CTA b/l, no wheeze/rales  GASTROINTESTINAL: NT, ND  MUSCULOSKELETAL: Normal ROM, no atrophy  SKIN: warm, no edema/rash/ or other skin changes  NEUROLOGIC: 5/5 power all extremities, no tremor, AAOx3  PSYCHIATRIC: Normal affect, Normal insight and judgement    Diabetic foot exam 10/2022 :     Left Foot:   Visual Exam: normal    Pulse DP: 2+ (normal)   Filament test: normal sensation    Vibratory sensation:  normal       Right Foot:   Visual Exam: normal    Pulse DP: 2+ (normal)   Filament test: normal sensation    Vibratory sensation: normal      REVIEW OF LABORATORY AND RADIOLOGY DATA:   Labs and documentation have been reviewed as described above. ASSESSMENT AND PLAN:   Yanci Cochran is a 57 y.o. female with a PMHx as noted above who presents for evaluation of uncontrolled type 2 diabetes. Problems:  Type 2 diabetes Uncontrolled  Hyperlipidemia  Hypertension    Patient is worried about more weight loss, so will limit the dose of Trulicity, however the next step if her BS is not controlled will be daily long acting insulin which she hopes to avoid. She has been going through a lot of stress as she had covid and she is caring for her sickly mother. Her diet includes eating a lot of potatoes and peanut butter and fruit cups and we discussed lifestyle modifications and timing of meals. She has yet to make an appt with diabetes education    PLAN  Type 2 Diabetes  Medications: Start Levemir insulin, take 15 units at 9 PM every night  Continue Metformin ER 1000mg twice daily (take it with breakfast and dinner)  Continue Trulicity 1.4JB weekly  Continue on Jardiance 25mg daily  Continue Glipizide SR 10mg take 2 tablets at the same time every morning    Advised to check glucose 2x/day  Referred for DM education and encouraged to attend    HTN: BP stable on current medications, no changes today.   HLD: Fasting lipids reviewed, cont pravastatin 10mg daily  Lab Results   Component Value Date/Time    Cholesterol, total 133 06/16/2022 08:55 AM    HDL Cholesterol 63 06/16/2022 08:55 AM    LDL, calculated 56.8 06/16/2022 08:55 AM    VLDL, calculated 13.2 06/16/2022 08:55 AM    Triglyceride 66 06/16/2022 08:55 AM    CHOL/HDL Ratio 2.1 06/16/2022 08:55 AM        We discussed the expected course, resolution and complications of the diagnosis(es) in detail. Medication risks, benefits, costs, interactions, and alternatives were discussed as indicated. I advised Michael Logan to contact the office if her condition worsens, changes or fails to improve as anticipated. Patient expressed understanding with the diagnosis(es) and plan. Please note that this dictation was completed with [employer], the computer voice recognition software. Quite often unanticipated grammatical, syntax, homophones, and other interpretive errors are inadvertently transcribed by the computer software. Efforts were made to correct these errors in proofreading. Please excuse any errors that have escaped final proofreading. Thank you. RTC 3 months    Bala Mcfarlane MD   [facility]    Please see patient instructions.

## 2023-01-26 NOTE — PATIENT INSTRUCTIONS
Diabetes Education referral made: Call them for Appt  [facility] for [address], [address] [place]  Phone [phone]  Fax [phone]     Blood sugar:   -Targets: Fasting   -Rest of the day: < 200    PLAN FOR TODAY    We will plan to make the following changes to your diabetes medications:  Start Levemir insulin, take 15 units at 9 PM every night  Continue Metformin ER 1000mg twice daily (take it with breakfast and dinner)  Continue Trulicity 6.9TZ weekly  Continue on Jardiance 25mg daily  Continue Glipizide SR 10mg take 2 tablets at the same time every morning    It will be important to continue checking your glucose just as you did previously. I would like you at the very least to check you glucose during:     + AM fasting before breakfast   + Any other time that you are not feeling well. Always provide a glucose log that is completed at every visit so that we can review the results of your home glucose together. Without this, it is not possible to make accurate changes to your insulin doses.

## 2023-03-10 ENCOUNTER — TELEPHONE (OUTPATIENT)
Dept: ENDOCRINOLOGY | Age: 63
End: 2023-03-10

## 2023-03-10 NOTE — TELEPHONE ENCOUNTER
Patient called and stated that ever since she has been on the levemir her morning sugars have been low. She stated that the first couple of days after starting it her sugar in the am was fine but now she will have readings in the 60's with the lowest being 65. She stated that a couple of times she has had it around 150 but it is more low than high. She stated that when this happens she will sometimes skip her morning Glipizide and her morning Metformin. She stated that she eats a good dinner at night so she does not know why its so low. She would like to know what she should do and she stated that it is okay to leave a detail message on her voice mail.

## 2023-03-10 NOTE — TELEPHONE ENCOUNTER
Pls have her cut back to 8 units levemir and continue the other diabetes meds.  If she continues to have blood sugar below 70 then to let us know

## 2023-03-17 ENCOUNTER — OFFICE VISIT (OUTPATIENT)
Dept: INTERNAL MEDICINE CLINIC | Age: 63
End: 2023-03-17

## 2023-03-17 VITALS
TEMPERATURE: 98.5 F | HEART RATE: 74 BPM | HEIGHT: 67 IN | OXYGEN SATURATION: 99 % | DIASTOLIC BLOOD PRESSURE: 72 MMHG | SYSTOLIC BLOOD PRESSURE: 116 MMHG | RESPIRATION RATE: 16 BRPM | WEIGHT: 147 LBS | BODY MASS INDEX: 23.07 KG/M2

## 2023-03-17 DIAGNOSIS — E11.69 HYPERLIPIDEMIA ASSOCIATED WITH TYPE 2 DIABETES MELLITUS (HCC): ICD-10-CM

## 2023-03-17 DIAGNOSIS — Z12.11 SCREEN FOR COLON CANCER: ICD-10-CM

## 2023-03-17 DIAGNOSIS — E78.5 HYPERLIPIDEMIA ASSOCIATED WITH TYPE 2 DIABETES MELLITUS (HCC): ICD-10-CM

## 2023-03-17 DIAGNOSIS — E11.65 TYPE 2 DIABETES MELLITUS WITH HYPERGLYCEMIA, WITH LONG-TERM CURRENT USE OF INSULIN (HCC): ICD-10-CM

## 2023-03-17 DIAGNOSIS — I10 ESSENTIAL HYPERTENSION: Primary | ICD-10-CM

## 2023-03-17 DIAGNOSIS — L81.9 SKIN HYPOPIGMENTATION: ICD-10-CM

## 2023-03-17 DIAGNOSIS — Z79.899 ON STATIN THERAPY: ICD-10-CM

## 2023-03-17 DIAGNOSIS — Z79.4 TYPE 2 DIABETES MELLITUS WITH HYPERGLYCEMIA, WITH LONG-TERM CURRENT USE OF INSULIN (HCC): ICD-10-CM

## 2023-03-17 RX ORDER — MELOXICAM 15 MG/1
TABLET ORAL
COMMUNITY
Start: 2023-02-20

## 2023-03-17 RX ORDER — ENALAPRIL MALEATE AND HYDROCHLOROTHIAZIDE 10; 25 MG/1; MG/1
TABLET ORAL
COMMUNITY

## 2023-03-17 RX ORDER — TRIAMCINOLONE ACETONIDE 1 MG/G
CREAM TOPICAL 2 TIMES DAILY
Qty: 15 G | Refills: 0 | Status: SHIPPED | OUTPATIENT
Start: 2023-03-17

## 2023-03-17 NOTE — PROGRESS NOTES
Chief Complaint   Patient presents with    Hypertension       SUBJECTIVE:    Karyle Mosher is a 57 y.o. female who is here today for a follow up appointment regarding current medical conditions including: Type 2 diabetes with long-term use of insulin, hyperlipidemia secondary to type 2 diabetes, essential hypertension, and long-term use of statin therapy. She states she is feeling well overall. The patient is currently being seen by endocrinology for management of her type 2 diabetes. She was referred due to significant increase in her hemoglobin A1c at last check. Her last hemoglobin A1c was approximately 9.3% she was subsequently placed on Levemir injections each evening, and has been tolerating this well. She has had a few low blood sugar readings, but no significant side effects otherwise. She continues to take triamterene/HCTZ daily along with enalapril for management of her hypertension. Her blood pressure appears stable and well controlled at this time. She is currently on pravastatin 10 mg daily for management of her cholesterol and tolerating this well. She denies any adverse side effects of the medication. Additionally, she denies any chest pain, chest pressure, shortness of breath, headaches, dizziness, blurred vision, palpitations, or syncope episodes. Additionally, she is requesting a refill for her triamcinolone cream.  She states she was given this in the past for hypopigmented \"white spots\" on her forehead and cheeks. She states the medication worked well in the past.      Current Outpatient Medications   Medication Sig Dispense Refill    meloxicam (MOBIC) 15 mg tablet TAKE 1 TABLET (15 MG TOTAL) BY MOUTH IN THE MORNING. TAKE WITH FOOD.      triamcinolone acetonide (KENALOG) 0.1 % topical cream Apply  to affected area two (2) times a day.  use thin layer 15 g 0    triamterene-hydroCHLOROthiazide (MAXZIDE) 37.5-25 mg per tablet TAKE 1 TABLET BY MOUTH EVERY DAY 90 Tablet 1 enalapril (VASOTEC) 10 mg tablet TAKE 1 TABLET BY MOUTH EVERY DAY 90 Tablet 1    pravastatin (PRAVACHOL) 10 mg tablet TAKE 1 TABLET BY MOUTH EVERYDAY AT BEDTIME 90 Tablet 1    Trulicity 1.5 FO/9.8 mL sub-q pen 0.5 mL by SubCUTAneous route every seven (7) days. Once completed 0.75mg for 4 weeks take the 1.5mg weekly dose 2 Each 3    Levemir FlexTouch U-100 Insuln 100 unit/mL (3 mL) inpn Take 15 units at bedtime 15 mL 5    BD Debbie 2nd Gen Pen Needle 32 gauge x 5/32\" ndle For use with Levemir insulin pen 1x daily E11.65 50 Pen Needle 5    glipiZIDE SR (GLUCOTROL XL) 10 mg CR tablet Take 2 Tablets by mouth daily. 180 Tablet 3    empagliflozin (Jardiance) 25 mg tablet TAKE 1 TABLET BY MOUTH EVERY DAY 90 Tablet 2    diphenhydrAMINE (BENADRYL) 25 mg tablet Take 50 mg by mouth every six (6) hours as needed. cholecalciferol (VITAMIN D3) 25 mcg (1,000 unit) cap Take 1,000 Units by mouth daily. fluticasone (FLONASE) 50 mcg/actuation nasal spray 2 sprays per nostril daily (Patient taking differently: as needed. 2 sprays per nostril daily) 1 Bottle 0    OTHER Glucometer test strips-test 2-3 times per week 90 Strip 3    enalapril-hydroCHLOROthiazide (VASERETIC) 10-25 mg tablet  (Patient not taking: Reported on 3/17/2023)      metFORMIN ER (GLUCOPHAGE XR) 500 mg tablet Take 2 Tablets by mouth two (2) times a day. (Patient taking differently: Take 1,000 mg by mouth two (2) times a day.  Taking 1000 mg in am and pm) 360 Tablet 2     Past Medical History:   Diagnosis Date    Abnormal mammogram of right breast 7/1/16    [facility] -calcifications--bx planned    Advance directive discussed with patient 12/08/2015,06/22/2016    Diabetes [facility])     Encounter for surgical counseling     7/28/16 note from Dr Anette Barfield    H/O breast biopsy 7/12/16     atypical ductal hyperplasia & calcifications    Herpes zoster 1/3/16    [facility]ocelyn Jaime PF notes area on right shoulder    Hypertension     Pancreas inflammation 1/09    Sternoclavicular joint strain 10/31/2016    [facility] Bed[facility] PF note  [facility] f[facility]u Gwenette Vaughn 12/27/16 f/u    Toe pain 04/04/2017    Isabelle FARFAN note onychomycosis    Vitamin D deficiency      Past Surgical History:   Procedure Laterality Date    ENDOSCOPY, COLON, DIAGNOSTIC      dec 09 [facility]    HX BREAST BIOPSY Right 7/12/16    [facility] Radiologist    HX CARPAL TUNNEL RELEASE  10/2011     Allergies   Allergen Reactions    Trazodone Other (comments)     Hallucinations  Other reaction(s):  Other (comments)  Hallucinations  Hallucinations       REVIEW OF SYSTEMS:                                        POSITIVE= bold text  Negative = regular text    General:                     fever, chills, sweats, generalized weakness, weight loss/gain,                                       loss of appetite   Eyes:                           blurred vision, eye pain, loss of vision, double vision  ENT:                            rhinorrhea, pharyngitis   Respiratory:               cough, sputum production, SOB, REGALADO, wheezing, pleuritic pain   Cardiology:                chest pain, palpitations, orthopnea, PND, edema, syncope   Gastrointestinal:       abdominal pain , N/V, diarrhea, dysphagia, constipation, bleeding   Genitourinary:           frequency, urgency, dysuria, hematuria, incontinence   Muskuloskeletal :      arthralgia, myalgia, back pain  Hematology:              easy bruising, nose or gum bleeding, lymphadenopathy   Dermatological:         rash, ulceration, pruritis, color change / jaundice  Endocrine:                 hot flashes or polydipsia   Neurological:             headache, dizziness, confusion, focal weakness, paresthesia,                                      Speech difficulties, memory loss, gait difficulty  Psychological:          Feelings of anxiety, depression, agitation        Social History     Socioeconomic History    Marital status:    Tobacco Use    Smoking status: Never    Smokeless tobacco: Never   Vaping Use    Vaping Use: Never used   Substance and Sexual Activity    Alcohol use: No    Drug use: No    Sexual activity: Yes     Partners: Male     Social Determinants of Health     Financial Resource Strain: Low Risk     Difficulty of Paying Living Expenses: Not hard at all   Food Insecurity: No Food Insecurity    Worried About Running Out of Food in the Last Year: Never true    Ran Out of Food in the Last Year: Never true     Family History   Problem Relation Age of Onset    Hypertension Mother     Diabetes Mother     Heart Disease Mother     Heath Palumbo Bladder Disease Father     Hypertension Father     Heart Disease Brother         mi at age 27, while playing racketball       OBJECTIVE:     Visit Vitals  /72 (BP 1 Location: Left upper arm, BP Patient Position: Sitting, BP Cuff Size: Adult)   Pulse 74   Temp 98.5 °F (36.9 °C) (Oral)   Resp 16   Ht 5' 7\" (1.702 m)   Wt 147 lb (66.7 kg)   LMP 05/31/2010   SpO2 99%   BMI 23.02 kg/m²       Constitutional: She appears well nourished, of stated age, and dressed appropriately. Eyes: Sclera anicteric, PERRLA, EOMI  Neck: Supple without lymphadenopathy. Thyroid normal, No JVD or bruits  Respiratory: Clear to ascultation X5, normal inspiratory effort, no adventitious breath sounds. Cardiovascular: Regular rate and rhythm, no rubs or gallops, PMI not displaced, No thrills, no peripheral edema  Integumentary: Small 1 x 1 cm hypopigmented patch to left forehead above eyebrow line. There is a small irregular hypopigmented patch over right cheek. No flaking or ulceration present. Neuro: Non-focal exam, A & O X 3.  Psychiatric: Appropriate affect and demeanor, pleasant and cooperative. Patient's thought content and thought processing appear to be within normal limits. ASSESSMENT/PLAN:     ICD-10-CM ICD-9-CM    1. Essential hypertension  I10 401.9 CBC W/O DIFF      METABOLIC PANEL, COMPREHENSIVE      2.  Type 2 diabetes mellitus with hyperglycemia, with long-term current use of insulin (HCC) E11.65 250.00     Z79.4 790.29      V58.67       3. Hyperlipidemia associated with type 2 diabetes mellitus (HCC)  E11.69 250.80 LIPID PANEL    E78.5 272.4       4. Skin hypopigmentation  L81.9 709.00 triamcinolone acetonide (KENALOG) 0.1 % topical cream      5. On statin therapy  Z79.899 V58.69       6. Screen for colon cancer  Z12.11 V76.51 REFERRAL FOR COLONOSCOPY        1: Patient to return for fasting labs in the next 1 to 2 weeks including: CBC, CMP, and lipid panel. 2: Patient to continue current medication regimen for management of type 2 diabetes. Follow-up with endocrinology as planned. 3: Patient to continue current medications for management of hypertension. Blood pressure stable at this time. 4: Continue pravastatin daily for management of hyperlipidemia. Patient tolerating well.  5: I will refill patient's triamcinolone cream to use as requested for hypopigmented patches. 6: Continue healthy lifestyle management with appropriate dietary intake. Avoid concentrated sweets and excess carbohydrates. 7: Patient will be referred for screening colonoscopy as discussed. 8: Continue all other medications and supplements as desired. 9: Patient to follow-up with me in approximately 6 months for welcome to Medicare preventative visit. Patient states understanding and agrees with plan. Orders Placed This Encounter    CBC W/O DIFF    METABOLIC PANEL, COMPREHENSIVE    LIPID PANEL    Referral for Colonoscopy (options for GI, Colon &  Rectal Surgery, & General Surgery)    enalapril-hydroCHLOROthiazide (VASERETIC) 10-25 mg tablet    meloxicam (MOBIC) 15 mg tablet    triamcinolone acetonide (KENALOG) 0.1 % topical cream         ATTENTION:   This medical record was transcribed using an electronic medical records system. Although proofread, it may and can contain electronic and spelling errors. Other human spelling and other errors may be present. Corrections may be executed at a later time.   Please feel free to contact us for any clarifications as needed. Follow-up and Dispositions    Return in about 6 months (around 9/17/2023) for Welcome to Medicare preventative visit with fasting labs. Ashly Constantino MSN APRN FNP-BC

## 2023-03-17 NOTE — PROGRESS NOTES
Addie Campbell is a 57 y.o. female     Chief Complaint   Patient presents with    Hypertension       Visit Vitals  /72 (BP 1 Location: Left upper arm, BP Patient Position: Sitting, BP Cuff Size: Adult)   Pulse 74   Temp 98.5 °F (36.9 °C) (Oral)   Resp 16   Ht 5' 7\" (1.702 m)   Wt 147 lb (66.7 kg)   LMP 05/31/2010   SpO2 99%   BMI 23.02 kg/m²       Health Maintenance Due   Topic Date Due    COVID-19 Vaccine (1) Never done    Shingles Vaccine (1 of 2) Never done    Colorectal Cancer Screening Combo  01/04/2020    Cervical cancer screen  12/17/2020    Eye Exam Retinal or Dilated  12/31/2021    Flu Vaccine (1) 08/01/2022    Bone Densitometry (Dexa) Screening  06/15/2023         1. \"Have you been to the ER, urgent care clinic since your last visit? Hospitalized since your last visit? \" No    2. \"Have you seen or consulted any other health care providers outside of the [address] [place] [address] since your last visit? \" No     3. For patients aged 39-70: Has the patient had a colonoscopy / FIT/ Cologuard? No      If the patient is female:    4. For patients aged 41-77: Has the patient had a mammogram within the past 2 years? Yes - no Care Gap present      5. For patients aged 21-65: Has the patient had a pap smear?  No

## 2023-03-27 ENCOUNTER — APPOINTMENT (OUTPATIENT)
Dept: INTERNAL MEDICINE CLINIC | Age: 63
End: 2023-03-27

## 2023-03-27 DIAGNOSIS — E11.69 HYPERLIPIDEMIA ASSOCIATED WITH TYPE 2 DIABETES MELLITUS (HCC): ICD-10-CM

## 2023-03-27 DIAGNOSIS — I10 ESSENTIAL HYPERTENSION: ICD-10-CM

## 2023-03-27 DIAGNOSIS — E78.5 HYPERLIPIDEMIA ASSOCIATED WITH TYPE 2 DIABETES MELLITUS (HCC): ICD-10-CM

## 2023-03-28 LAB
ALBUMIN SERPL-MCNC: 3.9 G/DL (ref 3.5–5)
ALBUMIN/GLOB SERPL: 1.3 (ref 1.1–2.2)
ALP SERPL-CCNC: 99 U/L (ref 45–117)
ALT SERPL-CCNC: 17 U/L (ref 12–78)
ANION GAP SERPL CALC-SCNC: 5 MMOL/L (ref 5–15)
AST SERPL-CCNC: 15 U/L (ref 15–37)
BILIRUB SERPL-MCNC: 0.4 MG/DL (ref 0.2–1)
BUN SERPL-MCNC: 27 MG/DL (ref 6–20)
BUN/CREAT SERPL: 28 (ref 12–20)
CALCIUM SERPL-MCNC: 9.2 MG/DL (ref 8.5–10.1)
CHLORIDE SERPL-SCNC: 109 MMOL/L (ref 97–108)
CHOLEST SERPL-MCNC: 153 MG/DL
CO2 SERPL-SCNC: 26 MMOL/L (ref 21–32)
CREAT SERPL-MCNC: 0.98 MG/DL (ref 0.55–1.02)
ERYTHROCYTE [DISTWIDTH] IN BLOOD BY AUTOMATED COUNT: 13.5 % (ref 11.5–14.5)
GLOBULIN SER CALC-MCNC: 3 G/DL (ref 2–4)
GLUCOSE SERPL-MCNC: 62 MG/DL (ref 65–100)
HCT VFR BLD AUTO: 37.6 % (ref 35–47)
HDLC SERPL-MCNC: 93 MG/DL
HDLC SERPL: 1.6 (ref 0–5)
HGB BLD-MCNC: 11.1 G/DL (ref 11.5–16)
LDLC SERPL CALC-MCNC: 49 MG/DL (ref 0–100)
MCH RBC QN AUTO: 28.9 PG (ref 26–34)
MCHC RBC AUTO-ENTMCNC: 29.5 G/DL (ref 30–36.5)
MCV RBC AUTO: 97.9 FL (ref 80–99)
NRBC # BLD: 0 K/UL (ref 0–0.01)
NRBC BLD-RTO: 0 PER 100 WBC
PLATELET # BLD AUTO: 307 K/UL (ref 150–400)
PMV BLD AUTO: 10.8 FL (ref 8.9–12.9)
POTASSIUM SERPL-SCNC: 4 MMOL/L (ref 3.5–5.1)
PROT SERPL-MCNC: 6.9 G/DL (ref 6.4–8.2)
RBC # BLD AUTO: 3.84 M/UL (ref 3.8–5.2)
SODIUM SERPL-SCNC: 140 MMOL/L (ref 136–145)
TRIGL SERPL-MCNC: 55 MG/DL (ref ?–150)
VLDLC SERPL CALC-MCNC: 11 MG/DL
WBC # BLD AUTO: 4.6 K/UL (ref 3.6–11)

## 2023-03-28 NOTE — PROGRESS NOTES
Metabolic panel is unremarkable. However, glucose level is quite low. Please do not take diabetic medications on days you are fasting for labs. It is unnecessary, and can drop your blood sugars a bit too low. Just FYI. Blood counts are normal with no signs of anemia. Cholesterol levels look good. Continue all current medications at this time. No changes.

## 2023-04-03 ENCOUNTER — TELEPHONE (OUTPATIENT)
Dept: ENDOCRINOLOGY | Age: 63
End: 2023-04-03

## 2023-05-03 ENCOUNTER — OFFICE VISIT (OUTPATIENT)
Dept: ENDOCRINOLOGY | Age: 63
End: 2023-05-03

## 2023-05-03 VITALS
HEART RATE: 76 BPM | SYSTOLIC BLOOD PRESSURE: 116 MMHG | WEIGHT: 153.4 LBS | DIASTOLIC BLOOD PRESSURE: 68 MMHG | BODY MASS INDEX: 24.08 KG/M2 | HEIGHT: 67 IN

## 2023-05-03 DIAGNOSIS — E11.65 TYPE 2 DIABETES MELLITUS WITH HYPERGLYCEMIA, WITHOUT LONG-TERM CURRENT USE OF INSULIN (HCC): Primary | ICD-10-CM

## 2023-05-03 DIAGNOSIS — E78.2 MIXED HYPERLIPIDEMIA: ICD-10-CM

## 2023-05-03 LAB — HBA1C MFR BLD HPLC: 7.5 %

## 2023-05-15 RX ORDER — INSULIN DETEMIR 100 [IU]/ML
15 INJECTION, SOLUTION SUBCUTANEOUS NIGHTLY
Qty: 5 ADJUSTABLE DOSE PRE-FILLED PEN SYRINGE | Refills: 3 | Status: SHIPPED | OUTPATIENT
Start: 2023-05-15

## 2023-06-04 DIAGNOSIS — E11.65 TYPE 2 DIABETES MELLITUS WITH HYPERGLYCEMIA (HCC): ICD-10-CM

## 2023-06-04 DIAGNOSIS — L81.9 DISORDER OF PIGMENTATION, UNSPECIFIED: ICD-10-CM

## 2023-06-05 RX ORDER — LIRAGLUTIDE 6 MG/ML
INJECTION SUBCUTANEOUS
Qty: 4 ADJUSTABLE DOSE PRE-FILLED PEN SYRINGE | Refills: 3 | Status: SHIPPED | OUTPATIENT
Start: 2023-06-05

## 2023-06-05 RX ORDER — TRIAMCINOLONE ACETONIDE 1 MG/G
CREAM TOPICAL
Qty: 30 G | Refills: 0 | Status: SHIPPED | OUTPATIENT
Start: 2023-06-05

## 2023-06-05 NOTE — TELEPHONE ENCOUNTER
Last Refill: Kenalog 1-30-23, Victoza ? Last Visit: 3/17/2023   Next Visit: 9/19/2023     Requested Prescriptions     Pending Prescriptions Disp Refills    triamcinolone (KENALOG) 0.1 % cream [Pharmacy Med Name: TRIAMCINOLONE 0.1% CREAM] 30 g 0     Sig: APPLY A THIN LAYER TO AFFECTED AREA TWICE A DAY    VICTOZA 18 MG/3ML SOPN SC injection [Pharmacy Med Name: Guille Flavors 3-MARIAN 18 MG/3 ML PEN] 4 Adjustable Dose Pre-filled Pen Syringe 3     Sig: INJECT 0.6 MG DAILY TO ABDOMEN.

## 2023-06-07 RX ORDER — EMPAGLIFLOZIN 25 MG/1
TABLET, FILM COATED ORAL
Qty: 90 TABLET | Refills: 2 | Status: SHIPPED | OUTPATIENT
Start: 2023-06-07

## 2023-06-07 RX ORDER — DULAGLUTIDE 1.5 MG/.5ML
1.5 INJECTION, SOLUTION SUBCUTANEOUS
Qty: 2 ML | Refills: 3 | Status: SHIPPED | OUTPATIENT
Start: 2023-06-07

## 2023-06-07 RX ORDER — DULAGLUTIDE 0.75 MG/.5ML
INJECTION, SOLUTION SUBCUTANEOUS
OUTPATIENT
Start: 2023-06-07

## 2023-07-12 DIAGNOSIS — E11.65 TYPE 2 DIABETES MELLITUS WITH HYPERGLYCEMIA, WITHOUT LONG-TERM CURRENT USE OF INSULIN (HCC): ICD-10-CM

## 2023-07-28 DIAGNOSIS — I10 ESSENTIAL (PRIMARY) HYPERTENSION: ICD-10-CM

## 2023-07-28 RX ORDER — TRIAMTERENE AND HYDROCHLOROTHIAZIDE 37.5; 25 MG/1; MG/1
TABLET ORAL
Qty: 90 TABLET | Refills: 1 | Status: SHIPPED | OUTPATIENT
Start: 2023-07-28

## 2023-07-28 NOTE — TELEPHONE ENCOUNTER
PCP: ALEXANDRA Costello NP    Last appt: 3/17/2023  Future Appointments   Date Time Provider [address]   9/19/2023  8:00 AM ALEXANDRA Costello NP [facility] AMB   10/16/2023 11:10 AM Talon Damon MD [facility] [address] [facility] AMB       Requested Prescriptions     Pending Prescriptions Disp Refills    triamterene-hydroCHLOROthiazide (MAXZIDE-25) 37.5-25 MG per tablet [Pharmacy Med Name: [place] 37.5-25 MG TB] 90 tablet 1     Sig: TAKE 1 TABLET BY MOUTH EVERY DAY       Prior labs and Blood pressures:  BP Readings from Last 3 Encounters:   05/03/23 116/68   03/17/23 116/72   01/26/23 134/79     Lab Results   Component Value Date/Time     03/27/2023 08:45 AM    K 4.0 03/27/2023 08:45 AM     03/27/2023 08:45 AM    CO2 26 03/27/2023 08:45 AM    BUN 27 03/27/2023 08:45 AM    GFRAA >60 06/16/2022 08:55 AM     Lab Results   Component Value Date/Time    KGN7NNRV 7.5 05/03/2023 12:39 PM     Lab Results   Component Value Date/Time    CHOL 153 03/27/2023 08:45 AM    HDL 93 03/27/2023 08:45 AM    VLDL 32 02/03/2021 03:43 PM     No results found for: VITD3, VD3RIA        Lab Results   Component Value Date/Time    TSH 0.83 11/18/2021 09:18 AM

## 2023-08-13 DIAGNOSIS — I10 ESSENTIAL (PRIMARY) HYPERTENSION: ICD-10-CM

## 2023-08-13 DIAGNOSIS — E11.65 TYPE 2 DIABETES MELLITUS WITH HYPERGLYCEMIA (HCC): ICD-10-CM

## 2023-08-14 RX ORDER — ENALAPRIL MALEATE 10 MG/1
TABLET ORAL
Qty: 90 TABLET | Refills: 1 | Status: SHIPPED | OUTPATIENT
Start: 2023-08-14

## 2023-08-14 NOTE — TELEPHONE ENCOUNTER
PCP: ALEXANDRA Clemens NP    Last appt: 3/17/2023  Future Appointments   Date Time Provider [address]   9/19/2023  8:00 AM ALEXANDRA Clemens NP [facility] AMB   10/16/2023 11:10 AM Donovan Moreno MD [facility] [address] BS AMB       Requested Prescriptions     Pending Prescriptions Disp Refills    enalapril (VASOTEC) 10 MG tablet [Pharmacy Med Name: ENALAPRIL MALEATE 10 MG TAB] 90 tablet 1     Sig: TAKE 1 TABLET BY MOUTH EVERY DAY       Prior labs and Blood pressures:  BP Readings from Last 3 Encounters:   05/03/23 116/68   03/17/23 116/72   01/26/23 134/79     Lab Results   Component Value Date/Time     03/27/2023 08:45 AM    K 4.0 03/27/2023 08:45 AM     03/27/2023 08:45 AM    CO2 26 03/27/2023 08:45 AM    BUN 27 03/27/2023 08:45 AM    GFRAA >60 06/16/2022 08:55 AM     Lab Results   Component Value Date/Time    [id number] 7.5 05/03/2023 12:39 PM     Lab Results   Component Value Date/Time    CHOL 153 03/27/2023 08:45 AM    HDL 93 03/27/2023 08:45 AM    VLDL 32 02/03/2021 03:43 PM     No results found for: VITD3, VD3RIA        Lab Results   Component Value Date/Time    TSH 0.83 11/18/2021 09:18 AM

## 2023-09-14 DIAGNOSIS — E11.65 TYPE 2 DIABETES MELLITUS WITH HYPERGLYCEMIA (HCC): ICD-10-CM

## 2023-09-14 DIAGNOSIS — E11.69 TYPE 2 DIABETES MELLITUS WITH OTHER SPECIFIED COMPLICATION (HCC): ICD-10-CM

## 2023-09-14 RX ORDER — PRAVASTATIN SODIUM 10 MG
TABLET ORAL
Qty: 90 TABLET | Refills: 0 | Status: SHIPPED | OUTPATIENT
Start: 2023-09-14 | End: 2023-10-02 | Stop reason: SDUPTHER

## 2023-09-14 NOTE — TELEPHONE ENCOUNTER
PCP: ALEXANDRA Burger NP    Last appt: 3/17/2023    Future Appointments   Date Time Provider [address]   9/19/2023  8:00 AM ALEXANDRA Burger NP [facility] AMB   10/16/2023 11:10 AM Jackie Silvestre MD R[facility] [address] [facility] AMB       Requested Prescriptions     Pending Prescriptions Disp Refills    pravastatin (PRAVACHOL) 10 MG tablet [Pharmacy Med Name: PRAVASTATIN SODIUM 10 MG TAB] 90 tablet 1     Sig: TAKE 1 TABLET BY MOUTH EVERYDAY AT BEDTIME

## 2023-09-19 ENCOUNTER — OFFICE VISIT (OUTPATIENT)
Facility: CLINIC | Age: 63
End: 2023-09-19
Payer: MEDICARE

## 2023-09-19 VITALS
OXYGEN SATURATION: 99 % | TEMPERATURE: 97.8 F | SYSTOLIC BLOOD PRESSURE: 124 MMHG | DIASTOLIC BLOOD PRESSURE: 70 MMHG | HEIGHT: 67 IN | RESPIRATION RATE: 16 BRPM | BODY MASS INDEX: 26.56 KG/M2 | WEIGHT: 169.2 LBS | HEART RATE: 70 BPM

## 2023-09-19 DIAGNOSIS — Z79.4 TYPE 2 DIABETES MELLITUS WITH HYPERGLYCEMIA, WITH LONG-TERM CURRENT USE OF INSULIN (HCC): ICD-10-CM

## 2023-09-19 DIAGNOSIS — E78.5 HYPERLIPIDEMIA DUE TO TYPE 2 DIABETES MELLITUS (HCC): ICD-10-CM

## 2023-09-19 DIAGNOSIS — E11.69 HYPERLIPIDEMIA DUE TO TYPE 2 DIABETES MELLITUS (HCC): ICD-10-CM

## 2023-09-19 DIAGNOSIS — I10 ESSENTIAL HYPERTENSION: ICD-10-CM

## 2023-09-19 DIAGNOSIS — E11.65 TYPE 2 DIABETES MELLITUS WITH HYPERGLYCEMIA, WITH LONG-TERM CURRENT USE OF INSULIN (HCC): ICD-10-CM

## 2023-09-19 DIAGNOSIS — Z00.00 WELCOME TO MEDICARE PREVENTIVE VISIT: Primary | ICD-10-CM

## 2023-09-19 DIAGNOSIS — Z71.89 ACP (ADVANCE CARE PLANNING): ICD-10-CM

## 2023-09-19 DIAGNOSIS — Z23 NEEDS FLU SHOT: ICD-10-CM

## 2023-09-19 PROCEDURE — G0402 INITIAL PREVENTIVE EXAM: HCPCS | Performed by: NURSE PRACTITIONER

## 2023-09-19 PROCEDURE — G0403 EKG FOR INITIAL PREVENT EXAM: HCPCS | Performed by: NURSE PRACTITIONER

## 2023-09-19 PROCEDURE — 3017F COLORECTAL CA SCREEN DOC REV: CPT | Performed by: NURSE PRACTITIONER

## 2023-09-19 PROCEDURE — 3078F DIAST BP <80 MM HG: CPT | Performed by: NURSE PRACTITIONER

## 2023-09-19 PROCEDURE — 1123F ACP DISCUSS/DSCN MKR DOCD: CPT | Performed by: NURSE PRACTITIONER

## 2023-09-19 PROCEDURE — 90694 VACC AIIV4 NO PRSRV 0.5ML IM: CPT | Performed by: NURSE PRACTITIONER

## 2023-09-19 PROCEDURE — 3074F SYST BP LT 130 MM HG: CPT | Performed by: NURSE PRACTITIONER

## 2023-09-19 PROCEDURE — 3046F HEMOGLOBIN A1C LEVEL >9.0%: CPT | Performed by: NURSE PRACTITIONER

## 2023-09-19 PROCEDURE — G0008 ADMIN INFLUENZA VIRUS VAC: HCPCS | Performed by: NURSE PRACTITIONER

## 2023-09-19 RX ORDER — MELOXICAM 15 MG/1
TABLET ORAL
COMMUNITY
Start: 2022-12-12

## 2023-09-19 RX ORDER — PEN NEEDLE, DIABETIC 32GX 5/32"
NEEDLE, DISPOSABLE MISCELLANEOUS
COMMUNITY
Start: 2023-09-01

## 2023-09-19 SDOH — ECONOMIC STABILITY: FOOD INSECURITY: WITHIN THE PAST 12 MONTHS, YOU WORRIED THAT YOUR FOOD WOULD RUN OUT BEFORE YOU GOT MONEY TO BUY MORE.: NEVER TRUE

## 2023-09-19 SDOH — ECONOMIC STABILITY: HOUSING INSECURITY
IN THE LAST 12 MONTHS, WAS THERE A TIME WHEN YOU DID NOT HAVE A STEADY PLACE TO SLEEP OR SLEPT IN A SHELTER (INCLUDING NOW)?: NO

## 2023-09-19 SDOH — ECONOMIC STABILITY: FOOD INSECURITY: WITHIN THE PAST 12 MONTHS, THE FOOD YOU BOUGHT JUST DIDN'T LAST AND YOU DIDN'T HAVE MONEY TO GET MORE.: NEVER TRUE

## 2023-09-19 SDOH — ECONOMIC STABILITY: INCOME INSECURITY: HOW HARD IS IT FOR YOU TO PAY FOR THE VERY BASICS LIKE FOOD, HOUSING, MEDICAL CARE, AND HEATING?: NOT HARD AT ALL

## 2023-09-19 ASSESSMENT — PATIENT HEALTH QUESTIONNAIRE - PHQ9
1. LITTLE INTEREST OR PLEASURE IN DOING THINGS: 0
SUM OF ALL RESPONSES TO PHQ QUESTIONS 1-9: 0
SUM OF ALL RESPONSES TO PHQ QUESTIONS 1-9: 0
SUM OF ALL RESPONSES TO PHQ9 QUESTIONS 1 & 2: 0
2. FEELING DOWN, DEPRESSED OR HOPELESS: 0
SUM OF ALL RESPONSES TO PHQ QUESTIONS 1-9: 0
SUM OF ALL RESPONSES TO PHQ QUESTIONS 1-9: 0

## 2023-09-19 ASSESSMENT — LIFESTYLE VARIABLES
HOW MANY STANDARD DRINKS CONTAINING ALCOHOL DO YOU HAVE ON A TYPICAL DAY: PATIENT DOES NOT DRINK
HOW OFTEN DO YOU HAVE A DRINK CONTAINING ALCOHOL: NEVER

## 2023-09-19 NOTE — PROGRESS NOTES
Medicare Annual Wellness Visit    Nathen Wiley is here for her Welcome to Medicare [address] [facility]   Welcome to Medicare preventive visit  -     EKG - Welcome to Medicare (IPPE)  Type 2 diabetes mellitus with hyperglycemia, with long-term current use of insulin ([address])  -     Microalbumin / Creatinine Urine Ratio; Future  -     empagliflozin (JARDIANCE) 25 MG tablet; Take 1 tablet by mouth daily, Disp-90 tablet, R-2Normal  Hyperlipidemia due to type 2 diabetes mellitus ([address])  -     Lipid Panel; Future  Essential hypertension  -     CBC; Future  -     Comprehensive Metabolic Panel; Future  -     TSH; Future  Needs flu shot  -     Influenza, FLUAD, (age 72 y+), IM, Preservative Free, 0.5 mL    Recommendations for Preventive Services Due: see orders and patient instructions/AVS.  Recommended screening schedule for the next 5-10 years is provided to the patient in written form: see Patient Instructions/AVS.     Return in about 6 months (around 3/19/2024) for Follow up, Fasting labs. Subjective   The following acute and/or chronic problems were also addressed today, including: Type 2 diabetes with long-term use of insulin and hyperlipidemia. Essential hypertension. She states she is feeling well overall and denies any new or acute complaints at this time. She is fasting today. She is requesting a flu shot today. The patient is currently being followed by endocrinology for management of her type 2 diabetes. Her last hemoglobin A1c showed improvement and was 7.5%. She states she is tolerating her medications regularly. She is currently on a combination of oral medications as well as insulin. She denies any hypoglycemic episodes. She is checking her blood sugars periodically. She is currently on pravastatin daily for management of her hyperlipidemia. She has been tolerating this well without adverse side effects.   She continues to take a combination of enalapril as well as triamterene/HCTZ daily

## 2023-09-19 NOTE — PATIENT INSTRUCTIONS

## 2023-09-20 LAB
ALBUMIN SERPL-MCNC: 4 G/DL (ref 3.5–5)
ALBUMIN/GLOB SERPL: 1.5 (ref 1.1–2.2)
ALP SERPL-CCNC: 99 U/L (ref 45–117)
ALT SERPL-CCNC: 23 U/L (ref 12–78)
ANION GAP SERPL CALC-SCNC: 6 MMOL/L (ref 5–15)
AST SERPL-CCNC: 10 U/L (ref 15–37)
BILIRUB SERPL-MCNC: 0.4 MG/DL (ref 0.2–1)
BUN SERPL-MCNC: 26 MG/DL (ref 6–20)
BUN/CREAT SERPL: 27 (ref 12–20)
CALCIUM SERPL-MCNC: 9.5 MG/DL (ref 8.5–10.1)
CHLORIDE SERPL-SCNC: 107 MMOL/L (ref 97–108)
CHOLEST SERPL-MCNC: 148 MG/DL
CO2 SERPL-SCNC: 26 MMOL/L (ref 21–32)
CREAT SERPL-MCNC: 0.98 MG/DL (ref 0.55–1.02)
CREAT UR-MCNC: 57.2 MG/DL
ERYTHROCYTE [DISTWIDTH] IN BLOOD BY AUTOMATED COUNT: 12.8 % (ref 11.5–14.5)
GLOBULIN SER CALC-MCNC: 2.7 G/DL (ref 2–4)
GLUCOSE SERPL-MCNC: 114 MG/DL (ref 65–100)
HCT VFR BLD AUTO: 36 % (ref 35–47)
HDLC SERPL-MCNC: 73 MG/DL
HDLC SERPL: 2 (ref 0–5)
HGB BLD-MCNC: 11.2 G/DL (ref 11.5–16)
LDLC SERPL CALC-MCNC: 58.8 MG/DL (ref 0–100)
MCH RBC QN AUTO: 29.3 PG (ref 26–34)
MCHC RBC AUTO-ENTMCNC: 31.1 G/DL (ref 30–36.5)
MCV RBC AUTO: 94.2 FL (ref 80–99)
MICROALBUMIN UR-MCNC: 1.54 MG/DL
MICROALBUMIN/CREAT UR-RTO: 27 MG/G (ref 0–30)
NRBC # BLD: 0 K/UL (ref 0–0.01)
NRBC BLD-RTO: 0 PER 100 WBC
PLATELET # BLD AUTO: 313 K/UL (ref 150–400)
PMV BLD AUTO: 11.2 FL (ref 8.9–12.9)
POTASSIUM SERPL-SCNC: 3.9 MMOL/L (ref 3.5–5.1)
PROT SERPL-MCNC: 6.7 G/DL (ref 6.4–8.2)
RBC # BLD AUTO: 3.82 M/UL (ref 3.8–5.2)
SODIUM SERPL-SCNC: 139 MMOL/L (ref 136–145)
TRIGL SERPL-MCNC: 81 MG/DL
TSH SERPL DL<=0.05 MIU/L-ACNC: 1.26 UIU/ML (ref 0.36–3.74)
VLDLC SERPL CALC-MCNC: 16.2 MG/DL
WBC # BLD AUTO: 4.6 K/UL (ref 3.6–11)

## 2023-10-02 DIAGNOSIS — I10 ESSENTIAL (PRIMARY) HYPERTENSION: ICD-10-CM

## 2023-10-02 DIAGNOSIS — E11.65 TYPE 2 DIABETES MELLITUS WITH HYPERGLYCEMIA (HCC): ICD-10-CM

## 2023-10-02 DIAGNOSIS — E11.69 TYPE 2 DIABETES MELLITUS WITH OTHER SPECIFIED COMPLICATION (HCC): ICD-10-CM

## 2023-10-02 RX ORDER — TRIAMTERENE AND HYDROCHLOROTHIAZIDE 37.5; 25 MG/1; MG/1
1 TABLET ORAL DAILY
Qty: 90 TABLET | Refills: 1 | Status: SHIPPED | OUTPATIENT
Start: 2023-10-02

## 2023-10-02 RX ORDER — ENALAPRIL MALEATE 10 MG/1
10 TABLET ORAL DAILY
Qty: 90 TABLET | Refills: 1 | Status: SHIPPED | OUTPATIENT
Start: 2023-10-02

## 2023-10-02 RX ORDER — PRAVASTATIN SODIUM 10 MG
10 TABLET ORAL
Qty: 90 TABLET | Refills: 1 | Status: SHIPPED | OUTPATIENT
Start: 2023-10-02

## 2023-10-02 NOTE — TELEPHONE ENCOUNTER
PCP: ALEXANDRA Waters NP    Last appt: 9/19/2023    Future Appointments   Date Time Provider [address]  [facility]   10/16/2023 11:10 AM Dana Schirmer, MD R[facility] [address] BS AMB   3/19/2024  8:00 AM LAEXANDRA Waters NP [facility] BS AMB       Requested Prescriptions     Pending Prescriptions Disp Refills    enalapril (VASOTEC) 10 MG tablet 90 tablet 1     Sig: Take 1 tablet by mouth daily    pravastatin (PRAVACHOL) 10 MG tablet 90 tablet 1     Sig: Take 1 tablet by mouth nightly    triamterene-hydroCHLOROthiazide (MAXZIDE-25) 37.5-25 MG per tablet 90 tablet 1     Sig: Take 1 tablet by mouth daily

## 2023-10-03 DIAGNOSIS — E11.65 TYPE 2 DIABETES MELLITUS WITH HYPERGLYCEMIA, WITH LONG-TERM CURRENT USE OF INSULIN (HCC): ICD-10-CM

## 2023-10-03 DIAGNOSIS — Z79.4 TYPE 2 DIABETES MELLITUS WITH HYPERGLYCEMIA, WITH LONG-TERM CURRENT USE OF INSULIN (HCC): ICD-10-CM

## 2023-10-03 RX ORDER — BLOOD-GLUCOSE CONTROL, LOW
EACH MISCELLANEOUS
Status: CANCELLED | OUTPATIENT
Start: 2023-10-03

## 2023-10-03 NOTE — TELEPHONE ENCOUNTER
Received medication refill from pharmacy     Medication:Enalapril Maleate 10mg tab  Pravastatin 10mg tab  Triamterene 37.5mg -Hydrochlorothiazide 25mg tab    Medication refill routed to provider

## 2023-10-06 RX ORDER — INSULIN DETEMIR 100 [IU]/ML
10 INJECTION, SOLUTION SUBCUTANEOUS NIGHTLY
Qty: 15 ML | Refills: 5 | Status: SHIPPED | OUTPATIENT
Start: 2023-10-06

## 2023-10-06 RX ORDER — CALCIUM CITRATE/VITAMIN D3 200MG-6.25
TABLET ORAL
Qty: 100 EACH | Refills: 3 | Status: SHIPPED | OUTPATIENT
Start: 2023-10-06

## 2023-10-06 RX ORDER — BLOOD-GLUCOSE METER
EACH MISCELLANEOUS
Qty: 1 EACH | Refills: 0 | Status: SHIPPED | OUTPATIENT
Start: 2023-10-06

## 2023-10-06 RX ORDER — GLIPIZIDE 10 MG/1
20 TABLET, FILM COATED, EXTENDED RELEASE ORAL DAILY
Qty: 180 TABLET | Refills: 3 | Status: SHIPPED | OUTPATIENT
Start: 2023-10-06

## 2023-10-06 RX ORDER — METFORMIN HYDROCHLORIDE 500 MG/1
1000 TABLET, EXTENDED RELEASE ORAL 2 TIMES DAILY
Qty: 360 TABLET | Refills: 3 | Status: SHIPPED | OUTPATIENT
Start: 2023-10-06

## 2023-10-06 RX ORDER — GLUCOSAM/CHON-MSM1/C/MANG/BOSW 500-416.6
TABLET ORAL
Qty: 100 EACH | Refills: 3 | Status: SHIPPED | OUTPATIENT
Start: 2023-10-06

## 2023-10-06 RX ORDER — PEN NEEDLE, DIABETIC 32GX 5/32"
NEEDLE, DISPOSABLE MISCELLANEOUS
Qty: 100 EACH | Refills: 5 | Status: SHIPPED | OUTPATIENT
Start: 2023-10-06

## 2023-10-06 RX ORDER — DULAGLUTIDE 1.5 MG/.5ML
1.5 INJECTION, SOLUTION SUBCUTANEOUS
Qty: 2 ML | Refills: 5 | Status: SHIPPED | OUTPATIENT
Start: 2023-10-06

## 2023-10-12 LAB
ALBUMIN/CREAT UR: 15 MG/G CREAT (ref 0–29)
CREAT UR-MCNC: 33.3 MG/DL
MICROALBUMIN UR-MCNC: 5 UG/ML

## 2023-10-16 ENCOUNTER — OFFICE VISIT (OUTPATIENT)
Age: 63
End: 2023-10-16
Payer: MEDICARE

## 2023-10-16 VITALS
HEART RATE: 87 BPM | DIASTOLIC BLOOD PRESSURE: 60 MMHG | WEIGHT: 167.2 LBS | SYSTOLIC BLOOD PRESSURE: 115 MMHG | BODY MASS INDEX: 26.24 KG/M2 | HEIGHT: 67 IN

## 2023-10-16 DIAGNOSIS — E11.65 TYPE 2 DIABETES MELLITUS WITH HYPERGLYCEMIA, WITH LONG-TERM CURRENT USE OF INSULIN (HCC): Primary | ICD-10-CM

## 2023-10-16 DIAGNOSIS — Z79.4 TYPE 2 DIABETES MELLITUS WITH HYPERGLYCEMIA, WITH LONG-TERM CURRENT USE OF INSULIN (HCC): Primary | ICD-10-CM

## 2023-10-16 LAB — HBA1C MFR BLD: 8.2 %

## 2023-10-16 PROCEDURE — 83036 HEMOGLOBIN GLYCOSYLATED A1C: CPT | Performed by: GENERAL ACUTE CARE HOSPITAL

## 2023-10-16 PROCEDURE — G8427 DOCREV CUR MEDS BY ELIG CLIN: HCPCS | Performed by: GENERAL ACUTE CARE HOSPITAL

## 2023-10-16 PROCEDURE — 3074F SYST BP LT 130 MM HG: CPT | Performed by: GENERAL ACUTE CARE HOSPITAL

## 2023-10-16 PROCEDURE — 1036F TOBACCO NON-USER: CPT | Performed by: GENERAL ACUTE CARE HOSPITAL

## 2023-10-16 PROCEDURE — G8400 PT W/DXA NO RESULTS DOC: HCPCS | Performed by: GENERAL ACUTE CARE HOSPITAL

## 2023-10-16 PROCEDURE — 1123F ACP DISCUSS/DSCN MKR DOCD: CPT | Performed by: GENERAL ACUTE CARE HOSPITAL

## 2023-10-16 PROCEDURE — 1090F PRES/ABSN URINE INCON ASSESS: CPT | Performed by: GENERAL ACUTE CARE HOSPITAL

## 2023-10-16 PROCEDURE — 2022F DILAT RTA XM EVC RTNOPTHY: CPT | Performed by: GENERAL ACUTE CARE HOSPITAL

## 2023-10-16 PROCEDURE — 3017F COLORECTAL CA SCREEN DOC REV: CPT | Performed by: GENERAL ACUTE CARE HOSPITAL

## 2023-10-16 PROCEDURE — G8419 CALC BMI OUT NRM PARAM NOF/U: HCPCS | Performed by: GENERAL ACUTE CARE HOSPITAL

## 2023-10-16 PROCEDURE — 3078F DIAST BP <80 MM HG: CPT | Performed by: GENERAL ACUTE CARE HOSPITAL

## 2023-10-16 PROCEDURE — G8484 FLU IMMUNIZE NO ADMIN: HCPCS | Performed by: GENERAL ACUTE CARE HOSPITAL

## 2023-10-16 PROCEDURE — 99214 OFFICE O/P EST MOD 30 MIN: CPT | Performed by: GENERAL ACUTE CARE HOSPITAL

## 2023-10-16 PROCEDURE — 3046F HEMOGLOBIN A1C LEVEL >9.0%: CPT | Performed by: GENERAL ACUTE CARE HOSPITAL

## 2023-10-16 RX ORDER — DULAGLUTIDE 1.5 MG/.5ML
1.5 INJECTION, SOLUTION SUBCUTANEOUS
Qty: 6 ML | Refills: 3 | Status: SHIPPED | OUTPATIENT
Start: 2023-10-16

## 2023-10-16 RX ORDER — ACYCLOVIR 400 MG/1
TABLET ORAL
Qty: 1 EACH | Refills: 0 | Status: SHIPPED | OUTPATIENT
Start: 2023-10-16

## 2023-10-16 RX ORDER — ACYCLOVIR 400 MG/1
TABLET ORAL
Qty: 3 EACH | Refills: 3 | Status: SHIPPED | OUTPATIENT
Start: 2023-10-16

## 2023-10-16 RX ORDER — INSULIN DETEMIR 100 [IU]/ML
14 INJECTION, SOLUTION SUBCUTANEOUS NIGHTLY
Qty: 15 ML | Refills: 5 | Status: SHIPPED | OUTPATIENT
Start: 2023-10-16

## 2023-10-16 NOTE — PROGRESS NOTES
REFERRED BY: Venkatesh DUENAS NP       REASON:  Uncontrolled type 2 diabetes mellitus      CHIEF COMPLAINT: evaluation of type 2 diabetes mellitus      HISTORY OF PRESENT ILLNESS:    Lorna Gutierrez is a 70 y.o. female with a PMHx as noted below who presents for evaluation of uncontrolled type 2 diabetes. PMH: uncontrolled type 2 diabetes, hyperlipidemia, hypertension   Was following with PCP      10/16/23   Ms Rod Grace had her mother pass away in 07/2023 who was living with her and due to that she has been grieving and not optimal in managing her Diabetes Mellitus. Also she ran out of Trulicity (stock in the pharmacy) and she has not taken it since 07/2023. She feels better now and that she has a good support system at home and wants to get back to improving her blood sugar numbers. Review of current blood sugars:  -122  After dinner 150-155     05/2023  Developed covid 19 for 2 weeks in mid 12/2022 and her sugars at that time went 400s. No steroids given. 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 Otherwise she is unsure why her BS control has not been sub optimum.       A1c improved from 9.3% 01/26/2023 to 7.5% 05/3/2023 8.2 10/16/23        Diabetes History:   Diabetes was diagnosed: 10 yrs   Family History of diabetes is Positive  grandmother DM2   Hb A1c : 8.2%  10/26/2022  8.4%  06/2022,  9.3% 01/26/2023, 7.5% 05/3/2023 8.2% 10/16/2023      Diabetes-related Hospitalizations:denies      Current Home Regimen:   Levemit 10 units QHS   Jardiance 07MV daily   Trulicity 2.9BP weekly ( has not taken since 07/2023 due to supply shortage)   Glipizide 10mg BID   MTF ER 1g BID      Review of home glucose:   As above      Hypoglycemia:no      Diet:   -3 meals   Breakfast = sausage and eggs, small pancake   Lunch = sandwich, fruit cup   Dinner = eats everything, chicken pork chops mashed fried potatoes, corn, green beans, sweet peas, canned greens, spinach   Snacks = potato chips, popcorn, occass ice

## 2023-10-17 RX ORDER — ISOPROPYL ALCOHOL 0.75 G/1
SWAB TOPICAL
Qty: 100 EACH | Refills: 3 | Status: SHIPPED | OUTPATIENT
Start: 2023-10-17

## 2023-10-19 ENCOUNTER — OFFICE VISIT (OUTPATIENT)
Facility: CLINIC | Age: 63
End: 2023-10-19
Payer: MEDICARE

## 2023-10-19 VITALS
OXYGEN SATURATION: 97 % | TEMPERATURE: 97.4 F | RESPIRATION RATE: 16 BRPM | HEART RATE: 71 BPM | BODY MASS INDEX: 27.12 KG/M2 | WEIGHT: 172.8 LBS | SYSTOLIC BLOOD PRESSURE: 124 MMHG | HEIGHT: 67 IN | DIASTOLIC BLOOD PRESSURE: 70 MMHG

## 2023-10-19 DIAGNOSIS — B37.31 VAGINAL CANDIDIASIS: ICD-10-CM

## 2023-10-19 DIAGNOSIS — R39.9 UTI SYMPTOMS: Primary | ICD-10-CM

## 2023-10-19 PROCEDURE — 1036F TOBACCO NON-USER: CPT | Performed by: NURSE PRACTITIONER

## 2023-10-19 PROCEDURE — 3017F COLORECTAL CA SCREEN DOC REV: CPT | Performed by: NURSE PRACTITIONER

## 2023-10-19 PROCEDURE — 1090F PRES/ABSN URINE INCON ASSESS: CPT | Performed by: NURSE PRACTITIONER

## 2023-10-19 PROCEDURE — G8419 CALC BMI OUT NRM PARAM NOF/U: HCPCS | Performed by: NURSE PRACTITIONER

## 2023-10-19 PROCEDURE — G8427 DOCREV CUR MEDS BY ELIG CLIN: HCPCS | Performed by: NURSE PRACTITIONER

## 2023-10-19 PROCEDURE — G8484 FLU IMMUNIZE NO ADMIN: HCPCS | Performed by: NURSE PRACTITIONER

## 2023-10-19 PROCEDURE — 99213 OFFICE O/P EST LOW 20 MIN: CPT | Performed by: NURSE PRACTITIONER

## 2023-10-19 PROCEDURE — G8400 PT W/DXA NO RESULTS DOC: HCPCS | Performed by: NURSE PRACTITIONER

## 2023-10-19 PROCEDURE — 3074F SYST BP LT 130 MM HG: CPT | Performed by: NURSE PRACTITIONER

## 2023-10-19 PROCEDURE — 3078F DIAST BP <80 MM HG: CPT | Performed by: NURSE PRACTITIONER

## 2023-10-19 PROCEDURE — 1123F ACP DISCUSS/DSCN MKR DOCD: CPT | Performed by: NURSE PRACTITIONER

## 2023-10-19 RX ORDER — CIPROFLOXACIN 500 MG/1
500 TABLET, FILM COATED ORAL 2 TIMES DAILY
Qty: 20 TABLET | Refills: 0 | Status: SHIPPED | OUTPATIENT
Start: 2023-10-19 | End: 2023-10-29

## 2023-10-19 RX ORDER — FLUCONAZOLE 150 MG/1
150 TABLET ORAL ONCE
Qty: 1 TABLET | Refills: 0 | Status: SHIPPED | OUTPATIENT
Start: 2023-10-19 | End: 2023-10-19

## 2023-10-20 LAB
APPEARANCE UR: CLEAR
BILIRUB UR QL: NEGATIVE
COLOR UR: ABNORMAL
GLUCOSE UR STRIP.AUTO-MCNC: >1000 MG/DL
HGB UR QL STRIP: NEGATIVE
KETONES UR QL STRIP.AUTO: NEGATIVE MG/DL
LEUKOCYTE ESTERASE UR QL STRIP.AUTO: NEGATIVE
NITRITE UR QL STRIP.AUTO: NEGATIVE
PH UR STRIP: 6.5 (ref 5–8)
PROT UR STRIP-MCNC: NEGATIVE MG/DL
SP GR UR REFRACTOMETRY: 1.02 (ref 1–1.03)
UROBILINOGEN UR QL STRIP.AUTO: 0.2 EU/DL (ref 0.2–1)

## 2023-10-21 LAB
BACTERIA SPEC CULT: NORMAL
CC UR VC: NORMAL
SERVICE CMNT-IMP: NORMAL

## 2023-11-17 ENCOUNTER — TELEPHONE (OUTPATIENT)
Age: 63
End: 2023-11-17

## 2023-11-17 NOTE — TELEPHONE ENCOUNTER
Received a fax that stated Levemir is not covered under [employer].   The preferred are:    Lantus solostar U-100  Lantus U-100     Toujeo max   Tojeo solostar  Tresiba flextouch U-100 and U-200

## 2023-11-20 RX ORDER — INSULIN DEGLUDEC INJECTION 100 U/ML
INJECTION, SOLUTION SUBCUTANEOUS
Qty: 15 ML | Refills: 5 | Status: SHIPPED | OUTPATIENT
Start: 2023-11-20

## 2024-01-09 RX ORDER — PEN NEEDLE, DIABETIC 32GX 5/32"
NEEDLE, DISPOSABLE MISCELLANEOUS
Qty: 100 EACH | Refills: 5 | Status: SHIPPED | OUTPATIENT
Start: 2024-01-09

## 2024-02-28 ENCOUNTER — OFFICE VISIT (OUTPATIENT)
Age: 64
End: 2024-02-28
Payer: MEDICARE

## 2024-02-28 VITALS
DIASTOLIC BLOOD PRESSURE: 75 MMHG | HEART RATE: 82 BPM | WEIGHT: 174.2 LBS | BODY MASS INDEX: 27.34 KG/M2 | SYSTOLIC BLOOD PRESSURE: 132 MMHG | HEIGHT: 67 IN

## 2024-02-28 DIAGNOSIS — E11.65 TYPE 2 DIABETES MELLITUS WITH HYPERGLYCEMIA, WITH LONG-TERM CURRENT USE OF INSULIN (HCC): Primary | ICD-10-CM

## 2024-02-28 DIAGNOSIS — Z79.4 TYPE 2 DIABETES MELLITUS WITH HYPERGLYCEMIA, WITH LONG-TERM CURRENT USE OF INSULIN (HCC): Primary | ICD-10-CM

## 2024-02-28 DIAGNOSIS — E78.2 MIXED HYPERLIPIDEMIA: ICD-10-CM

## 2024-02-28 LAB — HBA1C MFR BLD: 7 %

## 2024-02-28 PROCEDURE — 3075F SYST BP GE 130 - 139MM HG: CPT | Performed by: GENERAL ACUTE CARE HOSPITAL

## 2024-02-28 PROCEDURE — 99204 OFFICE O/P NEW MOD 45 MIN: CPT | Performed by: GENERAL ACUTE CARE HOSPITAL

## 2024-02-28 PROCEDURE — 1123F ACP DISCUSS/DSCN MKR DOCD: CPT | Performed by: GENERAL ACUTE CARE HOSPITAL

## 2024-02-28 PROCEDURE — 83036 HEMOGLOBIN GLYCOSYLATED A1C: CPT | Performed by: GENERAL ACUTE CARE HOSPITAL

## 2024-02-28 PROCEDURE — 3078F DIAST BP <80 MM HG: CPT | Performed by: GENERAL ACUTE CARE HOSPITAL

## 2024-02-28 RX ORDER — ACYCLOVIR 400 MG/1
TABLET ORAL
Qty: 3 EACH | Refills: 3 | Status: SHIPPED | OUTPATIENT
Start: 2024-02-28 | End: 2024-02-29

## 2024-02-28 RX ORDER — ACYCLOVIR 400 MG/1
TABLET ORAL
Qty: 1 EACH | Refills: 0 | Status: SHIPPED | OUTPATIENT
Start: 2024-02-28

## 2024-02-28 RX ORDER — DULAGLUTIDE 1.5 MG/.5ML
1.5 INJECTION, SOLUTION SUBCUTANEOUS
Qty: 2 ML | Refills: 5 | Status: SHIPPED | OUTPATIENT
Start: 2024-02-28

## 2024-02-28 NOTE — PROGRESS NOTES
age 28, while playing racketball    Hypertension Father     Gall Bladder Disease Father     Heart Disease Mother     Diabetes Mother     Hypertension Mother      Social History     Socioeconomic History    Marital status:      Spouse name: Not on file    Number of children: Not on file    Years of education: Not on file    Highest education level: Not on file   Occupational History    Not on file   Tobacco Use    Smoking status: Never     Passive exposure: Never    Smokeless tobacco: Never   Vaping Use    Vaping Use: Never used   Substance and Sexual Activity    Alcohol use: No    Drug use: No    Sexual activity: Yes     Partners: Male   Other Topics Concern    Not on file   Social History Narrative    Not on file     Social Determinants of Health     Financial Resource Strain: Low Risk  (9/19/2023)    Overall Financial Resource Strain (CARDIA)     Difficulty of Paying Living Expenses: Not hard at all   Food Insecurity: Not on file (9/19/2023)   Transportation Needs: Unknown (9/19/2023)    PRAPARE - Transportation     Lack of Transportation (Medical): Not on file     Lack of Transportation (Non-Medical): No   Physical Activity: Insufficiently Active (9/19/2023)    Exercise Vital Sign     Days of Exercise per Week: 2 days     Minutes of Exercise per Session: 20 min   Stress: Not on file   Social Connections: Not on file   Intimate Partner Violence: Not on file   Housing Stability: Unknown (9/19/2023)    Housing Stability Vital Sign     Unable to Pay for Housing in the Last Year: Not on file     Number of Places Lived in the Last Year: Not on file     Unstable Housing in the Last Year: No          REVIEW OF SYSTEMS: Complete ROS assessed and noted for that which is described  above, all else are negative.      CONSTITUTIONAL: no fevers, chills, weight loss   EYES: no blurry vision or double vision   CARDIOVASCULAR: no chest pain or palpitations   RESPIRATORY: no cough or shortness of breath   GASTROINTESTINAL:

## 2024-02-28 NOTE — PATIENT INSTRUCTIONS
For the trulicity its more common that the larger chain pharmacies tend to run out faster than local \"mom and pop\" pharmacies. I would recommend to first reach out to those local pharmacies as there is a better chance you can get refills there. There are 2 local pharmacies near our clinic that you can reach out to:   [facility] [phone] *  [facility] [phone]  Please call them first and if they have it available we can send a new script to them.     PLAN FOR TODAY    We will plan to make the following changes to your diabetes medications:     Levemir insulin, take 14 units at 9 PM every night   (if blood sugar dropping < 70 then take 10 units only)  Metformin ER 1000mg twice daily (take it with breakfast and dinner)  Trulicity 1.5mg weekly  Jardiance 25mg daily  Glipizide SR 10mg take 2 tablets at the same time every morning    It will be important to continue checking your glucose just as you did previously.   I would like you at the very least to check you glucose during:    AM fasting before breakfast  Bedtime  Any other time that you are not feeling well.     Always provide a glucose log that is completed at every visit so that we can review the results of your home glucose together. Without this, it is not possible to make accurate changes to your diabetes regimen.    Jaydon Vega MD  [facility]       Hypoglycemia:    Hypoglycemia means that your blood sugar is low and your body is not getting enough fuel. Some people get low blood sugar from not eating often enough. Some medicines to treat diabetes can cause low blood sugar. People who have had surgery on their stomachs or intestines may get hypoglycemia. Problems with the pancreas, kidneys, or liver also can cause low blood sugar.  A snack or drink with sugar in it will raise your blood sugar and should ease your symptoms right away.  How can you care for yourself at home?  Learn your signs of low blood sugar.

## 2024-02-29 RX ORDER — ACYCLOVIR 400 MG/1
TABLET ORAL
Qty: 9 EACH | Status: SHIPPED | OUTPATIENT
Start: 2024-02-29

## 2024-03-19 ENCOUNTER — OFFICE VISIT (OUTPATIENT)
Facility: CLINIC | Age: 64
End: 2024-03-19
Payer: MEDICARE

## 2024-03-19 VITALS
HEIGHT: 67 IN | DIASTOLIC BLOOD PRESSURE: 76 MMHG | OXYGEN SATURATION: 100 % | BODY MASS INDEX: 26.97 KG/M2 | WEIGHT: 171.8 LBS | RESPIRATION RATE: 16 BRPM | TEMPERATURE: 97.7 F | SYSTOLIC BLOOD PRESSURE: 122 MMHG | HEART RATE: 72 BPM

## 2024-03-19 DIAGNOSIS — Z79.4 TYPE 2 DIABETES MELLITUS WITH HYPERGLYCEMIA, WITH LONG-TERM CURRENT USE OF INSULIN (HCC): ICD-10-CM

## 2024-03-19 DIAGNOSIS — E11.69 HYPERLIPIDEMIA DUE TO TYPE 2 DIABETES MELLITUS (HCC): Primary | ICD-10-CM

## 2024-03-19 DIAGNOSIS — E78.5 HYPERLIPIDEMIA DUE TO TYPE 2 DIABETES MELLITUS (HCC): Primary | ICD-10-CM

## 2024-03-19 DIAGNOSIS — K21.9 GASTROESOPHAGEAL REFLUX DISEASE WITHOUT ESOPHAGITIS: ICD-10-CM

## 2024-03-19 DIAGNOSIS — E11.65 TYPE 2 DIABETES MELLITUS WITH HYPERGLYCEMIA, WITH LONG-TERM CURRENT USE OF INSULIN (HCC): ICD-10-CM

## 2024-03-19 DIAGNOSIS — E55.9 VITAMIN D DEFICIENCY: ICD-10-CM

## 2024-03-19 DIAGNOSIS — Z23 ENCOUNTER FOR ADMINISTRATION OF VACCINE: ICD-10-CM

## 2024-03-19 DIAGNOSIS — Z12.11 SCREEN FOR COLON CANCER: ICD-10-CM

## 2024-03-19 DIAGNOSIS — I10 ESSENTIAL HYPERTENSION: ICD-10-CM

## 2024-03-19 DIAGNOSIS — Z71.85 VACCINE COUNSELING: ICD-10-CM

## 2024-03-19 PROCEDURE — 90750 HZV VACC RECOMBINANT IM: CPT | Performed by: NURSE PRACTITIONER

## 2024-03-19 PROCEDURE — 99214 OFFICE O/P EST MOD 30 MIN: CPT | Performed by: NURSE PRACTITIONER

## 2024-03-19 PROCEDURE — 1123F ACP DISCUSS/DSCN MKR DOCD: CPT | Performed by: NURSE PRACTITIONER

## 2024-03-19 PROCEDURE — 3074F SYST BP LT 130 MM HG: CPT | Performed by: NURSE PRACTITIONER

## 2024-03-19 PROCEDURE — 3078F DIAST BP <80 MM HG: CPT | Performed by: NURSE PRACTITIONER

## 2024-03-19 PROCEDURE — 90471 IMMUNIZATION ADMIN: CPT | Performed by: NURSE PRACTITIONER

## 2024-03-19 RX ORDER — PANTOPRAZOLE SODIUM 40 MG/1
40 TABLET, DELAYED RELEASE ORAL
Qty: 90 TABLET | Refills: 1 | Status: SHIPPED | OUTPATIENT
Start: 2024-03-19

## 2024-03-19 ASSESSMENT — PATIENT HEALTH QUESTIONNAIRE - PHQ9
2. FEELING DOWN, DEPRESSED OR HOPELESS: NOT AT ALL
SUM OF ALL RESPONSES TO PHQ QUESTIONS 1-9: 0
SUM OF ALL RESPONSES TO PHQ QUESTIONS 1-9: 0
1. LITTLE INTEREST OR PLEASURE IN DOING THINGS: NOT AT ALL
SUM OF ALL RESPONSES TO PHQ9 QUESTIONS 1 & 2: 0
SUM OF ALL RESPONSES TO PHQ QUESTIONS 1-9: 0
SUM OF ALL RESPONSES TO PHQ QUESTIONS 1-9: 0

## 2024-03-19 NOTE — PROGRESS NOTES
Edwige Zhang is a 63 y.o. female     Chief Complaint   Patient presents with    Cholesterol Problem     6M    Hypertension     6M       /76 (Site: Left Upper Arm, Position: Sitting, Cuff Size: Medium Adult)   Pulse 72   Temp 97.7 °F (36.5 °C) (Oral)   Resp 16   Ht 1.702 m (5' 7\")   Wt 77.9 kg (171 lb 12.8 oz)   SpO2 100%   BMI 26.91 kg/m²     Health Maintenance Due   Topic Date Due    DEXA (modify frequency per FRAX score)  Never done    Respiratory Syncytial Virus (RSV) Pregnant or age 60 yrs+ (1 - 1-dose 60+ series) Never done    Colorectal Cancer Screen  01/04/2020    Cervical cancer screen  12/17/2020    Diabetic retinal exam  12/31/2020    Pneumococcal 65+ years Vaccine (2 of 2 - PCV) 11/20/2021    Diabetic foot exam  06/06/2023    COVID-19 Vaccine (5 - 2023-24 season) 09/01/2023    Annual Wellness Visit (Medicare Advantage)  01/01/2024         \"Have you been to the ER, urgent care clinic since your last visit?  Hospitalized since your last visit?\"    NO    “Have you seen or consulted any other health care providers outside of [facility] since your last visit?”    NO    “Have you had a colorectal cancer screening such as a colonoscopy/FIT/Cologuard?    NO    Date of last Colonoscopy: 1/4/2010  No cologuard on file  No FIT/FOBT on file   No flexible sigmoidoscopy on file         “Have you had a pap smear?”    NO    Date of last Cervical Cancer screen (HPV or PAP): 12/17/2015     Administered Shingrix Vaccine in left deltoid patient tolerated injection well.    Lot#:[id number]    Exp:12/6/25    NDC:25650-230-10               
                             Speech difficulties, memory loss, gait difficulty  Psychological:          Feelings of anxiety, depression, agitation        Social History     Socioeconomic History    Marital status:      Spouse name: None    Number of children: None    Years of education: None    Highest education level: None   Tobacco Use    Smoking status: Never     Passive exposure: Never    Smokeless tobacco: Never   Vaping Use    Vaping Use: Never used   Substance and Sexual Activity    Alcohol use: No    Drug use: No    Sexual activity: Yes     Partners: Male     Social Determinants of Health     Financial Resource Strain: Low Risk  (9/19/2023)    Overall Financial Resource Strain (CARDIA)     Difficulty of Paying Living Expenses: Not hard at all   Transportation Needs: Unknown (9/19/2023)    PRAPARE - Transportation     Lack of Transportation (Non-Medical): No   Physical Activity: Insufficiently Active (9/19/2023)    Exercise Vital Sign     Days of Exercise per Week: 2 days     Minutes of Exercise per Session: 20 min   Housing Stability: Unknown (9/19/2023)    Housing Stability Vital Sign     Unstable Housing in the Last Year: No     Family History   Problem Relation Age of Onset    Heart Disease Brother         mi at age 30, while playing racketball    Hypertension Father     Gall Bladder Disease Father     Heart Disease Mother     Diabetes Mother     Hypertension Mother        OBJECTIVE:     /76 (Site: Left Upper Arm, Position: Sitting, Cuff Size: Medium Adult)   Pulse 72   Temp 97.7 °F (36.5 °C) (Oral)   Resp 16   Ht 1.702 m (5' 7\")   Wt 77.9 kg (171 lb 12.8 oz)   SpO2 100%   BMI 26.91 kg/m²     Constitutional: She appears well nourished, of stated age, and dressed appropriately.  Eyes: Sclera anicteric, PERRLA, EOMI  Neck: Supple without lymphadenopathy. Thyroid normal, No JVD or bruits  Respiratory: Clear to ascultation X5, normal inspiratory effort, no adventitious breath

## 2024-03-20 LAB
25(OH)D3+25(OH)D2 SERPL-MCNC: 42.2 NG/ML (ref 30–100)
ALBUMIN SERPL-MCNC: 4.8 G/DL (ref 3.9–4.9)
ALBUMIN/GLOB SERPL: 2 {RATIO} (ref 1.2–2.2)
ALP SERPL-CCNC: 90 IU/L (ref 44–121)
ALT SERPL-CCNC: 11 IU/L (ref 0–32)
AST SERPL-CCNC: 13 IU/L (ref 0–40)
BILIRUB SERPL-MCNC: 0.3 MG/DL (ref 0–1.2)
BUN SERPL-MCNC: 30 MG/DL (ref 8–27)
BUN/CREAT SERPL: 27 (ref 12–28)
CALCIUM SERPL-MCNC: 9.6 MG/DL (ref 8.7–10.3)
CHLORIDE SERPL-SCNC: 101 MMOL/L (ref 96–106)
CHOLEST SERPL-MCNC: 135 MG/DL (ref 100–199)
CO2 SERPL-SCNC: 19 MMOL/L (ref 20–29)
CREAT SERPL-MCNC: 1.11 MG/DL (ref 0.57–1)
EGFRCR SERPLBLD CKD-EPI 2021: 55 ML/MIN/1.73
ERYTHROCYTE [DISTWIDTH] IN BLOOD BY AUTOMATED COUNT: 12.9 % (ref 11.7–15.4)
GLOBULIN SER CALC-MCNC: 2.4 G/DL (ref 1.5–4.5)
GLUCOSE SERPL-MCNC: 98 MG/DL (ref 70–99)
HCT VFR BLD AUTO: 35.6 % (ref 34–46.6)
HDLC SERPL-MCNC: 50 MG/DL
HGB BLD-MCNC: 11.8 G/DL (ref 11.1–15.9)
LDLC SERPL CALC-MCNC: 67 MG/DL (ref 0–99)
MCH RBC QN AUTO: 29.1 PG (ref 26.6–33)
MCHC RBC AUTO-ENTMCNC: 33.1 G/DL (ref 31.5–35.7)
MCV RBC AUTO: 88 FL (ref 79–97)
PLATELET # BLD AUTO: 358 X10E3/UL (ref 150–450)
POTASSIUM SERPL-SCNC: 4.3 MMOL/L (ref 3.5–5.2)
PROT SERPL-MCNC: 7.2 G/DL (ref 6–8.5)
RBC # BLD AUTO: 4.05 X10E6/UL (ref 3.77–5.28)
SODIUM SERPL-SCNC: 140 MMOL/L (ref 134–144)
TRIGL SERPL-MCNC: 93 MG/DL (ref 0–149)
VLDLC SERPL CALC-MCNC: 18 MG/DL (ref 5–40)
WBC # BLD AUTO: 4.9 X10E3/UL (ref 3.4–10.8)

## 2024-04-01 RX ORDER — GLIPIZIDE 10 MG/1
20 TABLET, FILM COATED, EXTENDED RELEASE ORAL DAILY
Qty: 180 TABLET | Refills: 3 | Status: SHIPPED | OUTPATIENT
Start: 2024-04-01

## 2024-04-17 RX ORDER — ACYCLOVIR 400 MG/1
TABLET ORAL
Qty: 1 EACH | Refills: 0 | Status: SHIPPED | OUTPATIENT
Start: 2024-04-17 | End: 2024-04-19

## 2024-04-17 RX ORDER — ACYCLOVIR 400 MG/1
TABLET ORAL
Qty: 9 EACH | Refills: 3 | Status: SHIPPED | OUTPATIENT
Start: 2024-04-17 | End: 2024-04-19

## 2024-04-17 NOTE — TELEPHONE ENCOUNTER
Patient left a message stating that she received a message from [employer] that she cannot receive her Dexcom through them.  She is requesting that it be sent to the [facility] .

## 2024-04-19 ENCOUNTER — TELEPHONE (OUTPATIENT)
Age: 64
End: 2024-04-19

## 2024-04-19 RX ORDER — BLOOD-GLUCOSE SENSOR
EACH MISCELLANEOUS
Qty: 2 EACH | Refills: 11 | Status: SHIPPED | OUTPATIENT
Start: 2024-04-19

## 2024-04-19 RX ORDER — BLOOD-GLUCOSE,RECEIVER,CONT
1 EACH MISCELLANEOUS DAILY
Qty: 1 EACH | Refills: 0 | Status: SHIPPED | OUTPATIENT
Start: 2024-04-19

## 2024-04-19 NOTE — TELEPHONE ENCOUNTER
Insurance denied Dexcom saying they will cover Freestyle ting first, please see if patient would be ok to get FreeStyle Ting 3 instead of Dexcom for now?

## 2024-04-19 NOTE — TELEPHONE ENCOUNTER
Spoke to the pt and informed her of the information. Pt stated she is willing to try the Freestyle Ting 3 (reader and sensor).

## 2024-04-22 ENCOUNTER — TELEPHONE (OUTPATIENT)
Age: 64
End: 2024-04-22

## 2024-04-22 NOTE — TELEPHONE ENCOUNTER
4/22/2024  4:32 PM    Good Afternoon,    Priti from [employer] called and stated that Ms. Zhang would like to go rene to a lower tier with chacha which would lower the copay.  The number to call [employer] back in regards to the request is [phone] reference number [id number].    Thanks  Leonor Fuller

## 2024-04-23 ENCOUNTER — TELEPHONE (OUTPATIENT)
Age: 64
End: 2024-04-23

## 2024-04-23 NOTE — TELEPHONE ENCOUNTER
[employer]  denied covering Trulicity at a lower tier. Letter states is available at the lowest tier.  Sent [facility] message.

## 2024-04-26 DIAGNOSIS — E11.65 TYPE 2 DIABETES MELLITUS WITH HYPERGLYCEMIA (HCC): ICD-10-CM

## 2024-04-26 RX ORDER — METFORMIN HYDROCHLORIDE 500 MG/1
1000 TABLET, EXTENDED RELEASE ORAL 2 TIMES DAILY
Qty: 360 TABLET | Refills: 2 | Status: SHIPPED | OUTPATIENT
Start: 2024-04-26

## 2024-05-02 DIAGNOSIS — E11.69 TYPE 2 DIABETES MELLITUS WITH OTHER SPECIFIED COMPLICATION (HCC): ICD-10-CM

## 2024-05-02 DIAGNOSIS — E11.65 TYPE 2 DIABETES MELLITUS WITH HYPERGLYCEMIA (HCC): ICD-10-CM

## 2024-05-02 RX ORDER — PRAVASTATIN SODIUM 10 MG
10 TABLET ORAL
Qty: 90 TABLET | Refills: 1 | Status: SHIPPED | OUTPATIENT
Start: 2024-05-02

## 2024-05-02 NOTE — TELEPHONE ENCOUNTER
PCP: Karl Gifford APRN - NP    Last appt: 3/19/2024    Future Appointments   Date Time Provider Department Center   5/22/2024  1:00 [facility]   7/24/2024  9:50 AM Jaydon Vega MD RDE [facility] [address] [facility]   9/23/2024  8:30 AM [facility] - [facility]       Requested Prescriptions     Pending Prescriptions Disp Refills    pravastatin (PRAVACHOL) 10 MG tablet [Pharmacy Med Name: PRAVASTATIN SODIUM 10 MG TAB] 90 tablet 1     Sig: TAKE 1 TABLET BY MOUTH EVERYDAY AT BEDTIME

## 2024-05-10 DIAGNOSIS — I10 ESSENTIAL (PRIMARY) HYPERTENSION: ICD-10-CM

## 2024-05-10 RX ORDER — TRIAMTERENE AND HYDROCHLOROTHIAZIDE 37.5; 25 MG/1; MG/1
1 TABLET ORAL DAILY
Qty: 90 TABLET | Refills: 1 | Status: SHIPPED | OUTPATIENT
Start: 2024-05-10

## 2024-05-10 NOTE — TELEPHONE ENCOUNTER
PCP: Karl Gifford APRN - NP    Last appt: 3/19/2024  Future Appointments   Date Time Provider Department Center   5/22/2024  1:00 PM NURSE VISIT [facility]   7/24/2024  9:50 AM Jaydon Vega MD RDE [address] [address] [facility]   9/23/2024  8:30 AM Karl Gifford, [facility]       Requested Prescriptions     Pending Prescriptions Disp Refills    triamterene-hydroCHLOROthiazide (MAXZIDE-25) 37.5-25 MG per tablet [Pharmacy Med Name: TRIAMTERENE-HCTZ 37.5-25 MG TB] 90 tablet 1     Sig: TAKE 1 TABLET BY MOUTH EVERY DAY       Prior labs and Blood pressures:  BP Readings from Last 3 Encounters:   03/19/24 122/76   02/28/24 132/75   10/19/23 124/70     Lab Results   Component Value Date/Time     03/19/2024 08:50 AM    K 4.3 03/19/2024 08:50 AM     03/19/2024 08:50 AM    CO2 19 03/19/2024 08:50 AM    BUN 30 03/19/2024 08:50 AM    GFRAA >60 06/16/2022 08:55 AM     Lab Results   Component Value Date/Time    YVA0QXSX 7.0 02/28/2024 12:27 PM     Lab Results   Component Value Date/Time    CHOL 135 03/19/2024 08:50 AM    CHOL 148 09/19/2023 09:08 AM    HDL 50 03/19/2024 08:50 AM    LDL 67 03/19/2024 08:50 AM    VLDL 18 03/19/2024 08:50 AM    VLDL 32 02/03/2021 03:43 PM     No results found for: \"VITD3\", \"VD3RIA\"        Lab Results   Component Value Date/Time    TSH 0.83 11/18/2021 09:18 AM

## 2024-05-14 ENCOUNTER — OFFICE VISIT (OUTPATIENT)
Facility: CLINIC | Age: 64
End: 2024-05-14
Payer: MEDICARE

## 2024-05-14 VITALS
SYSTOLIC BLOOD PRESSURE: 118 MMHG | HEIGHT: 67 IN | OXYGEN SATURATION: 99 % | WEIGHT: 173.4 LBS | HEART RATE: 72 BPM | DIASTOLIC BLOOD PRESSURE: 72 MMHG | RESPIRATION RATE: 16 BRPM | BODY MASS INDEX: 27.21 KG/M2 | TEMPERATURE: 98.2 F

## 2024-05-14 DIAGNOSIS — B30.9 VIRAL CONJUNCTIVITIS OF BOTH EYES: Primary | ICD-10-CM

## 2024-05-14 DIAGNOSIS — B37.31 VAGINA, CANDIDIASIS: ICD-10-CM

## 2024-05-14 PROCEDURE — 3078F DIAST BP <80 MM HG: CPT | Performed by: NURSE PRACTITIONER

## 2024-05-14 PROCEDURE — 3074F SYST BP LT 130 MM HG: CPT | Performed by: NURSE PRACTITIONER

## 2024-05-14 PROCEDURE — 99213 OFFICE O/P EST LOW 20 MIN: CPT | Performed by: NURSE PRACTITIONER

## 2024-05-14 PROCEDURE — 1123F ACP DISCUSS/DSCN MKR DOCD: CPT | Performed by: NURSE PRACTITIONER

## 2024-05-14 RX ORDER — TOBRAMYCIN AND DEXAMETHASONE 3; 1 MG/ML; MG/ML
1 SUSPENSION/ DROPS OPHTHALMIC
Qty: 2.5 ML | Refills: 0 | Status: SHIPPED | OUTPATIENT
Start: 2024-05-14 | End: 2024-05-24

## 2024-05-14 RX ORDER — FLUCONAZOLE 150 MG/1
150 TABLET ORAL ONCE
Qty: 1 TABLET | Refills: 0 | Status: SHIPPED | OUTPATIENT
Start: 2024-05-14 | End: 2024-05-14

## 2024-05-14 NOTE — PROGRESS NOTES
Edwige Zhnag is a 63 y.o. female     Chief Complaint   Patient presents with    Conjunctivitis     Possible pink eye in both eyes       /72 (Site: Left Upper Arm, Position: Sitting, Cuff Size: Medium Adult)   Pulse 72   Temp 98.2 °F (36.8 °C) (Oral)   Resp 16   Ht 1.702 m (5' 7\")   Wt 78.7 kg (173 lb 6.4 oz)   SpO2 99%   BMI 27.16 kg/m²     Health Maintenance Due   Topic Date Due    DEXA (modify frequency per FRAX score)  Never done    Respiratory Syncytial Virus (RSV) Pregnant or age 60 yrs+ (1 - 1-dose 60+ series) Never done    Colorectal Cancer Screen  01/04/2020    Cervical cancer screen  12/17/2020    Diabetic retinal exam  12/31/2020    Pneumococcal 65+ years Vaccine (2 of 2 - PCV) 11/20/2021    Diabetic foot exam  06/06/2023    COVID-19 Vaccine (5 - 2023-24 season) 09/01/2023    Annual Wellness Visit (Medicare Advantage)  01/01/2024    Shingles vaccine (2 of 2) 05/14/2024         \"Have you been to the ER, urgent care clinic since your last visit?  Hospitalized since your last visit?\"    NO    “Have you seen or consulted any other health care providers outside of [facility] since your last visit?”    NO    “Have you had a colorectal cancer screening such as a colonoscopy/FIT/Cologuard?    NO scheduled for July2024    Date of last Colonoscopy: 1/4/2010  No cologuard on file  No FIT/FOBT on file   No flexible sigmoidoscopy on file         “Have you had a pap smear?”    NO    Date of last Cervical Cancer screen (HPV or PAP): 12/17/2015                   
   Sexual activity: Yes     Partners: Male     Social Determinants of Health     Financial Resource Strain: Low Risk  (9/19/2023)    Overall Financial Resource Strain (CARDIA)     Difficulty of Paying Living Expenses: Not hard at all   Transportation Needs: Unknown (9/19/2023)    PRAPARE - Transportation     Lack of Transportation (Non-Medical): No   Physical Activity: Insufficiently Active (9/19/2023)    Exercise Vital Sign     Days of Exercise per Week: 2 days     Minutes of Exercise per Session: 20 min   Housing Stability: Unknown (9/19/2023)    Housing Stability Vital Sign     Unstable Housing in the Last Year: No     Family History   Problem Relation Age of Onset    Heart Disease Brother         mi at age 30, while playing racketball    Hypertension Father     Gall Bladder Disease Father     Heart Disease Mother     Diabetes Mother     Hypertension Mother        OBJECTIVE:     /72 (Site: Left Upper Arm, Position: Sitting, Cuff Size: Medium Adult)   Pulse 72   Temp 98.2 °F (36.8 °C) (Oral)   Resp 16   Ht 1.702 m (5' 7\")   Wt 78.7 kg (173 lb 6.4 oz)   SpO2 99%   BMI 27.16 kg/m²     Constitutional: She appears well nourished, of stated age, and dressed appropriately.  Eyes: Sclera slightly injected bilaterally. PERRLA, EOMI  ENT: Nares clear, moist mucous membranes, pharynx clear  Neck: Supple without lymphadenopathy. Thyroid normal, No JVD or bruits  Respiratory: Clear to ascultation X5, normal inspiratory effort, no adventitious breath sounds.  Cardiovascular: Regular rate and rhythm, no rubs or gallops, PMI not displaced, No thrills  Genitourinary: Deferred.    ASSESSMENT/PLAN:      Diagnosis Orders   1. Viral conjunctivitis of both eyes  tobramycin-dexAMETHasone (TOBRADEX) 0.3-0.1 % ophthalmic suspension      2. Vagina, candidiasis  fluconazole (DIFLUCAN) 150 MG tablet        1: Patient will be given TobraDex ophthalmic suspension to use every 4 hours while awake x 7 days.  2: Patient will be

## 2024-05-17 ENCOUNTER — TELEPHONE (OUTPATIENT)
Age: 64
End: 2024-05-17

## 2024-05-22 ENCOUNTER — NURSE ONLY (OUTPATIENT)
Facility: CLINIC | Age: 64
End: 2024-05-22
Payer: MEDICARE

## 2024-05-22 VITALS
BODY MASS INDEX: 27.06 KG/M2 | HEART RATE: 73 BPM | TEMPERATURE: 98.5 F | DIASTOLIC BLOOD PRESSURE: 72 MMHG | WEIGHT: 172.4 LBS | RESPIRATION RATE: 16 BRPM | OXYGEN SATURATION: 99 % | SYSTOLIC BLOOD PRESSURE: 116 MMHG | HEIGHT: 67 IN

## 2024-05-22 DIAGNOSIS — Z23 ENCOUNTER FOR ADMINISTRATION OF VACCINE: Primary | ICD-10-CM

## 2024-05-22 PROCEDURE — 90750 HZV VACC RECOMBINANT IM: CPT | Performed by: NURSE PRACTITIONER

## 2024-05-22 PROCEDURE — 90471 IMMUNIZATION ADMIN: CPT | Performed by: NURSE PRACTITIONER

## 2024-05-22 NOTE — PROGRESS NOTES
Edwige Zhang is a 63 y.o. female     Chief Complaint   Patient presents with    Immunizations       /72 (Site: Left Upper Arm, Position: Sitting, Cuff Size: Medium Adult)   Pulse 73   Temp 98.5 °F (36.9 °C) (Oral)   Resp 16   Ht 1.702 m (5' 7\")   Wt 78.2 kg (172 lb 6.4 oz)   SpO2 99%   BMI 27.00 kg/m²     Health Maintenance Due   Topic Date Due    DEXA (modify frequency per FRAX score)  Never done    Respiratory Syncytial Virus (RSV) Pregnant or age 60 yrs+ (1 - 1-dose 60+ series) Never done    Colorectal Cancer Screen  01/04/2020    Cervical cancer screen  12/17/2020    Diabetic retinal exam  12/31/2020    Pneumococcal 65+ years Vaccine (2 of 2 - PCV) 11/20/2021    Diabetic foot exam  06/06/2023    COVID-19 Vaccine (5 - 2023-24 season) 09/01/2023    Annual Wellness Visit (Medicare Advantage)  01/01/2024    Shingles vaccine (2 of 2) 05/14/2024         \"Have you been to the ER, urgent care clinic since your last visit?  Hospitalized since your last visit?\"    NO    “Have you seen or consulted any other health care providers outside of [facility] since your last visit?”    NO    “Have you had a colorectal cancer screening such as a colonoscopy/FIT/Cologuard?    NO    Date of last Colonoscopy: 1/4/2010  No cologuard on file  No FIT/FOBT on file   No flexible sigmoidoscopy on file         “Have you had a pap smear?”    NO    Date of last Cervical Cancer screen (HPV or PAP): 12/17/2015     Administered Shingrix in left deltoid patient tolerated injection well.    Lot#:[id number]    Exp:1/25/2026    NDC:29711-956-86

## 2024-06-13 DIAGNOSIS — E11.65 TYPE 2 DIABETES MELLITUS WITH HYPERGLYCEMIA (HCC): ICD-10-CM

## 2024-06-13 DIAGNOSIS — I10 ESSENTIAL (PRIMARY) HYPERTENSION: ICD-10-CM

## 2024-06-13 RX ORDER — ENALAPRIL MALEATE 10 MG/1
10 TABLET ORAL DAILY
Qty: 90 TABLET | Refills: 1 | Status: SHIPPED | OUTPATIENT
Start: 2024-06-13

## 2024-06-13 NOTE — TELEPHONE ENCOUNTER
PCP: Karl Gifford APRN - NP    Last appt: 5/14/2024    Future Appointments   Date Time Provider Department Center   7/24/2024  9:50 AM Jaydon Vega MD [facility] AMB   9/23/2024  8:30 AM Karl Gifford APRN - NP [facility] AMB       Requested Prescriptions     Pending Prescriptions Disp Refills    enalapril (VASOTEC) 10 MG tablet [Pharmacy Med Name: ENALAPRIL MALEATE 10 MG TAB] 90 tablet 1     Sig: TAKE 1 TABLET BY MOUTH EVERY DAY

## 2024-07-24 ENCOUNTER — OFFICE VISIT (OUTPATIENT)
Age: 64
End: 2024-07-24
Payer: MEDICARE

## 2024-07-24 VITALS
BODY MASS INDEX: 28.85 KG/M2 | SYSTOLIC BLOOD PRESSURE: 135 MMHG | DIASTOLIC BLOOD PRESSURE: 80 MMHG | HEART RATE: 72 BPM | HEIGHT: 67 IN | WEIGHT: 183.8 LBS

## 2024-07-24 DIAGNOSIS — E11.65 TYPE 2 DIABETES MELLITUS WITH HYPERGLYCEMIA, WITH LONG-TERM CURRENT USE OF INSULIN (HCC): Primary | ICD-10-CM

## 2024-07-24 DIAGNOSIS — E78.2 MIXED HYPERLIPIDEMIA: ICD-10-CM

## 2024-07-24 DIAGNOSIS — Z79.4 TYPE 2 DIABETES MELLITUS WITH HYPERGLYCEMIA, WITH LONG-TERM CURRENT USE OF INSULIN (HCC): Primary | ICD-10-CM

## 2024-07-24 DIAGNOSIS — E66.3 OVERWEIGHT: ICD-10-CM

## 2024-07-24 LAB — HBA1C MFR BLD: 7.1 %

## 2024-07-24 PROCEDURE — 83036 HEMOGLOBIN GLYCOSYLATED A1C: CPT | Performed by: GENERAL ACUTE CARE HOSPITAL

## 2024-07-24 PROCEDURE — 1123F ACP DISCUSS/DSCN MKR DOCD: CPT | Performed by: GENERAL ACUTE CARE HOSPITAL

## 2024-07-24 PROCEDURE — 3075F SYST BP GE 130 - 139MM HG: CPT | Performed by: GENERAL ACUTE CARE HOSPITAL

## 2024-07-24 PROCEDURE — 99214 OFFICE O/P EST MOD 30 MIN: CPT | Performed by: GENERAL ACUTE CARE HOSPITAL

## 2024-07-24 PROCEDURE — 3079F DIAST BP 80-89 MM HG: CPT | Performed by: GENERAL ACUTE CARE HOSPITAL

## 2024-07-24 RX ORDER — SEMAGLUTIDE 0.68 MG/ML
INJECTION, SOLUTION SUBCUTANEOUS
Qty: 3 ML | Refills: 5 | Status: SHIPPED
Start: 2024-07-24

## 2024-07-24 NOTE — PATIENT INSTRUCTIONS
PLAN FOR TODAY    We will plan to make the following changes to your diabetes medications:     Levemir insulin, take 14 units at 9 PM every night   (if blood sugar dropping < 70 then take 10 units only)  Metformin ER 1000mg twice daily (take it with breakfast and dinner)  Glipizide SR 10mg take 2 tablets at the same time every morning  Ozempic Take 0.25mg weekly and after 4 weeks take 0.5mg weekly  (Please fill out the PAP for Shania Nordisk)    It will be important to continue checking your glucose just as you did previously.   I would like you at the very least to check you glucose during:    AM fasting before breakfast  Bedtime  Any other time that you are not feeling well.     Always provide a glucose log that is completed at every visit so that we can review the results of your home glucose together. Without this, it is not possible to make accurate changes to your diabetes regimen.    Jaydon Vega MD  [facility]       Hypoglycemia:    Hypoglycemia means that your blood sugar is low and your body is not getting enough fuel. Some people get low blood sugar from not eating often enough. Some medicines to treat diabetes can cause low blood sugar. People who have had surgery on their stomachs or intestines may get hypoglycemia. Problems with the pancreas, kidneys, or liver also can cause low blood sugar.  A snack or drink with sugar in it will raise your blood sugar and should ease your symptoms right away.  How can you care for yourself at home?  Learn your signs of low blood sugar. They are different for everyone. Some common early signs include:  Nausea.  Hunger.  Feeling nervous, irritable, or shaky.  Cold, clammy skin.  Sweating (when you're not exercising).  Use the \"rule of 15\" to treat low blood sugar. This includes eating 15 grams of carbohydrate from a quick-sugar food, such as 3 or 4 glucose tablets or ½ cup of juice. Wait 15 minutes and check your blood sugar. If it is still

## 2024-07-24 NOTE — PROGRESS NOTES
[facility]  DR CARMINE FIGUEROA         ASSESSMENT AND PLAN:     Type 2 Diabetes Mellitus, suboptimal control  Hyperlipidemia  Hypertension     Ms Zhang has been doing better    Hemoglobin A1c better controlled by Trulicity but due to cost will switch to Ozempic.      PLAN   Type 2 Diabetes  Medications:   Levemir insulin, take 14 units at 9 PM every night   (if blood sugar dropping < 70 then take 10 units only)  Metformin ER 1000mg twice daily (take it with breakfast and dinner)  Glipizide SR 10mg take 2 tablets at the same time every morning  Ozempic Take 0.25mg weekly and after 4 weeks take 0.5mg weekly  (Please fill out the PAP for Shania Nordisk)      Advised to check glucose 2x/day, would benefit from CGM   Referred for DM education and encouraged to attend  Urine microalbumin normal     HTN: BP stable on current medications, no changes today.  HLD: Fasting lipids reviewed, continue pravastatin 10mg daily  Lab Results   Component Value Date/Time    CHOL 135 03/19/2024 08:50 AM    CHOL 148 09/19/2023 09:08 AM    TRIG 93 03/19/2024 08:50 AM    LDL 67 03/19/2024 08:50 AM    HDL 50 03/19/2024 08:50 AM    CHOLHDLRATIO 2.0 09/19/2023 09:08 AM      Lab Results   Component Value Date    LABALBU 5.0 10/11/2023    MALBCR 15 10/11/2023    MALBCR 27 09/19/2023    MALBCR 11 06/16/2022      ----------------------------------------------------------------------------------------------------------------------------------------------      HISTORY OF PRESENT ILLNESS:    Edwige Zhang is a 64 y.o. female with a PMHx as noted below who presents for evaluation of uncontrolled type 2 diabetes.     PMH: uncontrolled type 2 diabetes, hyperlipidemia, hypertension  Was following with PCP      7/24/24   Ran out of trulicity and jardiance for past 4-6 weeks, due to cost has not been able to continue with that    2/28/24   Denies new complaints    10/16/23   Ms Zhang had her mother pass away in 07/2023 who

## 2024-07-26 PROBLEM — E66.3 OVERWEIGHT: Status: ACTIVE | Noted: 2024-07-26

## 2024-07-26 PROBLEM — E78.2 MIXED HYPERLIPIDEMIA: Status: ACTIVE | Noted: 2024-07-26

## 2024-08-13 ENCOUNTER — TELEPHONE (OUTPATIENT)
Age: 64
End: 2024-08-13

## 2024-08-13 NOTE — TELEPHONE ENCOUNTER
5 boxes of Ozempic arrived today from [employer].  Please call the patient to inform her that she can  the medication at her convenience.(Between 8 am-12 pm and 2 pm-4:30 pm).

## 2024-09-10 ENCOUNTER — ANESTHESIA EVENT (OUTPATIENT)
Facility: HOSPITAL | Age: 64
End: 2024-09-10
Payer: MEDICARE

## 2024-09-12 ENCOUNTER — HOSPITAL ENCOUNTER (OUTPATIENT)
Facility: HOSPITAL | Age: 64
Setting detail: OUTPATIENT SURGERY
Discharge: HOME | End: 2024-09-12
Attending: INTERNAL MEDICINE | Admitting: INTERNAL MEDICINE
Payer: MEDICARE

## 2024-09-12 ENCOUNTER — ANESTHESIA (OUTPATIENT)
Facility: HOSPITAL | Age: 64
End: 2024-09-12
Payer: MEDICARE

## 2024-09-12 VITALS
SYSTOLIC BLOOD PRESSURE: 125 MMHG | HEART RATE: 82 BPM | TEMPERATURE: 97.2 F | RESPIRATION RATE: 19 BRPM | WEIGHT: 177 LBS | OXYGEN SATURATION: 98 % | BODY MASS INDEX: 27.78 KG/M2 | HEIGHT: 67 IN | DIASTOLIC BLOOD PRESSURE: 72 MMHG

## 2024-09-12 LAB
GLUCOSE BLD STRIP.AUTO-MCNC: 194 MG/DL (ref 65–117)
GLUCOSE BLD STRIP.AUTO-MCNC: 194 MG/DL (ref 65–117)
SERVICE CMNT-IMP: ABNORMAL
SERVICE CMNT-IMP: ABNORMAL

## 2024-09-12 PROCEDURE — 6360000002 HC RX W HCPCS

## 2024-09-12 PROCEDURE — 3600007512: Performed by: INTERNAL MEDICINE

## 2024-09-12 PROCEDURE — 2580000003 HC RX 258: Performed by: INTERNAL MEDICINE

## 2024-09-12 PROCEDURE — 2500000003 HC RX 250 WO HCPCS

## 2024-09-12 PROCEDURE — 82962 GLUCOSE BLOOD TEST: CPT

## 2024-09-12 PROCEDURE — 7100000011 HC PHASE II RECOVERY - ADDTL 15 MIN: Performed by: INTERNAL MEDICINE

## 2024-09-12 PROCEDURE — 7100000010 HC PHASE II RECOVERY - FIRST 15 MIN: Performed by: INTERNAL MEDICINE

## 2024-09-12 PROCEDURE — 3700000001 HC ADD 15 MINUTES (ANESTHESIA): Performed by: INTERNAL MEDICINE

## 2024-09-12 PROCEDURE — 3700000000 HC ANESTHESIA ATTENDED CARE: Performed by: INTERNAL MEDICINE

## 2024-09-12 PROCEDURE — 3600007502: Performed by: INTERNAL MEDICINE

## 2024-09-12 RX ORDER — SODIUM CHLORIDE 0.9 % (FLUSH) 0.9 %
5-40 SYRINGE (ML) INJECTION PRN
Status: DISCONTINUED | OUTPATIENT
Start: 2024-09-12 | End: 2024-09-12 | Stop reason: HOSPADM

## 2024-09-12 RX ORDER — LIDOCAINE HYDROCHLORIDE 20 MG/ML
INJECTION, SOLUTION EPIDURAL; INFILTRATION; INTRACAUDAL; PERINEURAL
Status: DISCONTINUED | OUTPATIENT
Start: 2024-09-12 | End: 2024-09-12 | Stop reason: SDUPTHER

## 2024-09-12 RX ORDER — SODIUM CHLORIDE 9 MG/ML
25 INJECTION, SOLUTION INTRAVENOUS PRN
Status: DISCONTINUED | OUTPATIENT
Start: 2024-09-12 | End: 2024-09-12 | Stop reason: HOSPADM

## 2024-09-12 RX ORDER — SODIUM CHLORIDE 0.9 % (FLUSH) 0.9 %
5-40 SYRINGE (ML) INJECTION EVERY 12 HOURS SCHEDULED
Status: DISCONTINUED | OUTPATIENT
Start: 2024-09-12 | End: 2024-09-12 | Stop reason: HOSPADM

## 2024-09-12 RX ADMIN — PROPOFOL 30 MG: 10 INJECTION, EMULSION INTRAVENOUS at 09:18

## 2024-09-12 RX ADMIN — PROPOFOL 20 MG: 10 INJECTION, EMULSION INTRAVENOUS at 09:23

## 2024-09-12 RX ADMIN — PROPOFOL 30 MG: 10 INJECTION, EMULSION INTRAVENOUS at 09:15

## 2024-09-12 RX ADMIN — PROPOFOL 70 MG: 10 INJECTION, EMULSION INTRAVENOUS at 09:13

## 2024-09-12 RX ADMIN — SODIUM CHLORIDE 25 ML: 9 INJECTION, SOLUTION INTRAVENOUS at 09:06

## 2024-09-12 RX ADMIN — LIDOCAINE HYDROCHLORIDE 100 MG: 20 INJECTION, SOLUTION EPIDURAL; INFILTRATION; INTRACAUDAL; PERINEURAL at 09:13

## 2024-09-12 RX ADMIN — PROPOFOL 30 MG: 10 INJECTION, EMULSION INTRAVENOUS at 09:20

## 2024-09-12 RX ADMIN — PROPOFOL 20 MG: 10 INJECTION, EMULSION INTRAVENOUS at 09:14

## 2024-09-12 RX ADMIN — PROPOFOL 30 MG: 10 INJECTION, EMULSION INTRAVENOUS at 09:17

## 2024-09-12 ASSESSMENT — PAIN - FUNCTIONAL ASSESSMENT: PAIN_FUNCTIONAL_ASSESSMENT: NONE - DENIES PAIN

## 2024-09-13 DIAGNOSIS — K21.9 GASTROESOPHAGEAL REFLUX DISEASE WITHOUT ESOPHAGITIS: ICD-10-CM

## 2024-09-13 RX ORDER — PANTOPRAZOLE SODIUM 40 MG/1
40 TABLET, DELAYED RELEASE ORAL
Qty: 90 TABLET | Refills: 1 | Status: SHIPPED | OUTPATIENT
Start: 2024-09-13

## 2024-09-23 ENCOUNTER — OFFICE VISIT (OUTPATIENT)
Facility: CLINIC | Age: 64
End: 2024-09-23
Payer: MEDICARE

## 2024-09-23 VITALS
HEART RATE: 67 BPM | SYSTOLIC BLOOD PRESSURE: 126 MMHG | TEMPERATURE: 97.7 F | HEIGHT: 67 IN | DIASTOLIC BLOOD PRESSURE: 74 MMHG | OXYGEN SATURATION: 100 % | BODY MASS INDEX: 28.63 KG/M2 | WEIGHT: 182.4 LBS | RESPIRATION RATE: 16 BRPM

## 2024-09-23 DIAGNOSIS — I10 ESSENTIAL HYPERTENSION: ICD-10-CM

## 2024-09-23 DIAGNOSIS — Z71.89 ACP (ADVANCE CARE PLANNING): ICD-10-CM

## 2024-09-23 DIAGNOSIS — E11.65 TYPE 2 DIABETES MELLITUS WITH HYPERGLYCEMIA, WITH LONG-TERM CURRENT USE OF INSULIN (HCC): ICD-10-CM

## 2024-09-23 DIAGNOSIS — M67.874 ACHILLES TENDINOSIS OF LEFT ANKLE: ICD-10-CM

## 2024-09-23 DIAGNOSIS — E11.69 HYPERLIPIDEMIA DUE TO TYPE 2 DIABETES MELLITUS (HCC): ICD-10-CM

## 2024-09-23 DIAGNOSIS — Z79.4 TYPE 2 DIABETES MELLITUS WITH HYPERGLYCEMIA, WITH LONG-TERM CURRENT USE OF INSULIN (HCC): ICD-10-CM

## 2024-09-23 DIAGNOSIS — E78.5 HYPERLIPIDEMIA DUE TO TYPE 2 DIABETES MELLITUS (HCC): ICD-10-CM

## 2024-09-23 DIAGNOSIS — Z00.00 MEDICARE ANNUAL WELLNESS VISIT, SUBSEQUENT: Primary | ICD-10-CM

## 2024-09-23 DIAGNOSIS — E55.9 VITAMIN D DEFICIENCY: ICD-10-CM

## 2024-09-23 DIAGNOSIS — Z79.899 ON STATIN THERAPY: ICD-10-CM

## 2024-09-23 PROCEDURE — 99212 OFFICE O/P EST SF 10 MIN: CPT | Performed by: NURSE PRACTITIONER

## 2024-09-23 PROCEDURE — 3074F SYST BP LT 130 MM HG: CPT | Performed by: NURSE PRACTITIONER

## 2024-09-23 PROCEDURE — 3078F DIAST BP <80 MM HG: CPT | Performed by: NURSE PRACTITIONER

## 2024-09-23 PROCEDURE — 1123F ACP DISCUSS/DSCN MKR DOCD: CPT | Performed by: NURSE PRACTITIONER

## 2024-09-23 PROCEDURE — G0439 PPPS, SUBSEQ VISIT: HCPCS | Performed by: NURSE PRACTITIONER

## 2024-09-23 SDOH — ECONOMIC STABILITY: FOOD INSECURITY: WITHIN THE PAST 12 MONTHS, YOU WORRIED THAT YOUR FOOD WOULD RUN OUT BEFORE YOU GOT MONEY TO BUY MORE.: NEVER TRUE

## 2024-09-23 SDOH — ECONOMIC STABILITY: FOOD INSECURITY: WITHIN THE PAST 12 MONTHS, THE FOOD YOU BOUGHT JUST DIDN'T LAST AND YOU DIDN'T HAVE MONEY TO GET MORE.: NEVER TRUE

## 2024-09-23 SDOH — ECONOMIC STABILITY: INCOME INSECURITY: HOW HARD IS IT FOR YOU TO PAY FOR THE VERY BASICS LIKE FOOD, HOUSING, MEDICAL CARE, AND HEATING?: NOT HARD AT ALL

## 2024-09-23 ASSESSMENT — PATIENT HEALTH QUESTIONNAIRE - PHQ9
SUM OF ALL RESPONSES TO PHQ9 QUESTIONS 1 & 2: 0
2. FEELING DOWN, DEPRESSED OR HOPELESS: NOT AT ALL
SUM OF ALL RESPONSES TO PHQ QUESTIONS 1-9: 0
1. LITTLE INTEREST OR PLEASURE IN DOING THINGS: NOT AT ALL

## 2024-10-23 DIAGNOSIS — I10 ESSENTIAL (PRIMARY) HYPERTENSION: ICD-10-CM

## 2024-10-23 DIAGNOSIS — E11.65 TYPE 2 DIABETES MELLITUS WITH HYPERGLYCEMIA (HCC): ICD-10-CM

## 2024-10-23 RX ORDER — METFORMIN HYDROCHLORIDE 500 MG/1
1000 TABLET, EXTENDED RELEASE ORAL 2 TIMES DAILY
Qty: 360 TABLET | Refills: 2 | Status: SHIPPED | OUTPATIENT
Start: 2024-10-23

## 2024-10-24 DIAGNOSIS — E11.65 TYPE 2 DIABETES MELLITUS WITH HYPERGLYCEMIA, WITH LONG-TERM CURRENT USE OF INSULIN (HCC): ICD-10-CM

## 2024-10-24 DIAGNOSIS — Z79.4 TYPE 2 DIABETES MELLITUS WITH HYPERGLYCEMIA, WITH LONG-TERM CURRENT USE OF INSULIN (HCC): ICD-10-CM

## 2024-10-24 RX ORDER — TRIAMTERENE AND HYDROCHLOROTHIAZIDE 37.5; 25 MG/1; MG/1
1 TABLET ORAL DAILY
Qty: 90 TABLET | Refills: 1 | Status: SHIPPED | OUTPATIENT
Start: 2024-10-24

## 2024-10-24 RX ORDER — ENALAPRIL MALEATE 10 MG/1
10 TABLET ORAL DAILY
Qty: 90 TABLET | Refills: 1 | Status: SHIPPED | OUTPATIENT
Start: 2024-10-24

## 2024-10-24 NOTE — TELEPHONE ENCOUNTER
PCP: Karl Gifford APRN - NP    Last appt: 9/23/2024    Future Appointments   Date Time Provider Department Center   12/5/2024  9:10 AM Jaydon Vega MD [facility]   3/24/2025  9:30 AM Karl Gifford APRN - NP [facility] DEP       Requested Prescriptions     Pending Prescriptions Disp Refills    enalapril (VASOTEC) 10 MG tablet [Pharmacy Med Name: ENALAPRIL MALEATE 10 MG TAB] 90 tablet 1     Sig: TAKE 1 TABLET BY MOUTH EVERY DAY    triamterene-hydroCHLOROthiazide (MAXZIDE-25) 37.5-25 MG per tablet [Pharmacy Med Name: TRIAMTERENE-HCTZ 37.5-25 MG TB] 90 tablet 1     Sig: TAKE 1 TABLET BY MOUTH EVERY DAY

## 2024-10-30 DIAGNOSIS — E11.69 TYPE 2 DIABETES MELLITUS WITH OTHER SPECIFIED COMPLICATION (HCC): ICD-10-CM

## 2024-10-30 DIAGNOSIS — E11.65 TYPE 2 DIABETES MELLITUS WITH HYPERGLYCEMIA (HCC): ICD-10-CM

## 2024-10-30 RX ORDER — PRAVASTATIN SODIUM 10 MG
10 TABLET ORAL
Qty: 90 TABLET | Refills: 1 | Status: SHIPPED | OUTPATIENT
Start: 2024-10-30

## 2024-10-30 NOTE — TELEPHONE ENCOUNTER
PCP: Karl Gifford APRN - NP    Last appt: 9/23/2024    Future Appointments   Date Time Provider Department Center   12/5/2024  9:10 AM Jaydon Vega MD [facility] [address] BS [facility]   3/24/2025  9:30 AM [facility]       Requested Prescriptions     Pending Prescriptions Disp Refills    pravastatin (PRAVACHOL) 10 MG tablet [Pharmacy Med Name: PRAVASTATIN SODIUM 10 MG TAB] 90 tablet 1     Sig: TAKE 1 TABLET BY MOUTH EVERYDAY AT BEDTIME

## 2024-11-13 LAB
ALBUMIN SERPL-MCNC: 4.6 G/DL (ref 3.9–4.9)
ALBUMIN/CREAT UR: 8 MG/G CREAT (ref 0–29)
ALP SERPL-CCNC: 105 IU/L (ref 44–121)
ALT SERPL-CCNC: 13 IU/L (ref 0–32)
AST SERPL-CCNC: 12 IU/L (ref 0–40)
BILIRUB SERPL-MCNC: 0.4 MG/DL (ref 0–1.2)
BUN SERPL-MCNC: 24 MG/DL (ref 8–27)
BUN/CREAT SERPL: 22 (ref 12–28)
CALCIUM SERPL-MCNC: 10.3 MG/DL (ref 8.7–10.3)
CHLORIDE SERPL-SCNC: 99 MMOL/L (ref 96–106)
CHOLEST SERPL-MCNC: 132 MG/DL (ref 100–199)
CO2 SERPL-SCNC: 23 MMOL/L (ref 20–29)
CREAT SERPL-MCNC: 1.11 MG/DL (ref 0.57–1)
CREAT UR-MCNC: 103.5 MG/DL
EGFRCR SERPLBLD CKD-EPI 2021: 55 ML/MIN/1.73
GLOBULIN SER CALC-MCNC: 2.5 G/DL (ref 1.5–4.5)
GLUCOSE SERPL-MCNC: 138 MG/DL (ref 70–99)
HBA1C MFR BLD: 7.9 % (ref 4.8–5.6)
HDLC SERPL-MCNC: 49 MG/DL
IMP & REVIEW OF LAB RESULTS: NORMAL
LDLC SERPL CALC-MCNC: 64 MG/DL (ref 0–99)
Lab: NORMAL
MICROALBUMIN UR-MCNC: 7.9 UG/ML
POTASSIUM SERPL-SCNC: 4 MMOL/L (ref 3.5–5.2)
PROT SERPL-MCNC: 7.1 G/DL (ref 6–8.5)
REPORT: NORMAL
REPORT: NORMAL
SODIUM SERPL-SCNC: 140 MMOL/L (ref 134–144)
TRIGL SERPL-MCNC: 106 MG/DL (ref 0–149)
TSH SERPL DL<=0.005 MIU/L-ACNC: 1.55 UIU/ML (ref 0.45–4.5)
VLDLC SERPL CALC-MCNC: 19 MG/DL (ref 5–40)

## 2024-12-05 ENCOUNTER — OFFICE VISIT (OUTPATIENT)
Age: 64
End: 2024-12-05

## 2024-12-05 VITALS
WEIGHT: 179.8 LBS | DIASTOLIC BLOOD PRESSURE: 78 MMHG | SYSTOLIC BLOOD PRESSURE: 137 MMHG | BODY MASS INDEX: 28.22 KG/M2 | HEIGHT: 67 IN | HEART RATE: 76 BPM

## 2024-12-05 DIAGNOSIS — E66.3 OVERWEIGHT: ICD-10-CM

## 2024-12-05 DIAGNOSIS — E11.65 TYPE 2 DIABETES MELLITUS WITH HYPERGLYCEMIA, WITH LONG-TERM CURRENT USE OF INSULIN (HCC): Primary | ICD-10-CM

## 2024-12-05 DIAGNOSIS — E78.2 MIXED HYPERLIPIDEMIA: ICD-10-CM

## 2024-12-05 DIAGNOSIS — Z79.4 TYPE 2 DIABETES MELLITUS WITH HYPERGLYCEMIA, WITH LONG-TERM CURRENT USE OF INSULIN (HCC): Primary | ICD-10-CM

## 2024-12-05 RX ORDER — INSULIN GLARGINE 100 [IU]/ML
INJECTION, SOLUTION SUBCUTANEOUS
Qty: 15 ML | Refills: 5 | Status: SHIPPED | OUTPATIENT
Start: 2024-12-05

## 2024-12-05 RX ORDER — SEMAGLUTIDE 1.34 MG/ML
1 INJECTION, SOLUTION SUBCUTANEOUS
COMMUNITY

## 2024-12-05 RX ORDER — PEN NEEDLE, DIABETIC 32GX 5/32"
NEEDLE, DISPOSABLE MISCELLANEOUS
Qty: 100 EACH | Refills: 5 | Status: SHIPPED | OUTPATIENT
Start: 2024-12-05

## 2024-12-05 NOTE — PROGRESS NOTES
Not on file   Social History Narrative    Not on file     Social Determinants of Health     Financial Resource Strain: Low Risk  (9/23/2024)    Overall Financial Resource Strain (CARDIA)     Difficulty of Paying Living Expenses: Not hard at all   Food Insecurity: No Food Insecurity (9/23/2024)    Hunger Vital Sign     Worried About Running Out of Food in the Last Year: Never true     Ran Out of Food in the Last Year: Never true   Transportation Needs: Unknown (9/23/2024)    PRAPARE - Transportation     Lack of Transportation (Medical): Not on file     Lack of Transportation (Non-Medical): No   Physical Activity: Insufficiently Active (9/23/2024)    Exercise Vital Sign     Days of Exercise per Week: 2 days     Minutes of Exercise per Session: 10 min   Stress: Not on file   Social Connections: Not on file   Intimate Partner Violence: Not on file   Housing Stability: Unknown (9/23/2024)    Housing Stability Vital Sign     Unable to Pay for Housing in the Last Year: Not on file     Number of Times Moved in the Last Year: Not on file     Homeless in the Last Year: No          REVIEW OF SYSTEMS: Complete ROS assessed and noted for that which is described  above, all else are negative.      CONSTITUTIONAL: no fevers, chills, weight loss   EYES: no blurry vision or double vision   CARDIOVASCULAR: no chest pain or palpitations   RESPIRATORY: no cough or shortness of breath   GASTROINTESTINAL: no dysphagia or abdominal pain   MUSCULOSKELETAL: no joint pains or weakness   SKIN: no rashes   NEUROLOGICAL: no numbness, tingling, or headaches   PSYCHIATRIC: no depression or anxiety   ENDOCRINE: no heat or cold intolerance, no polyuria or polydipsia         PHYSICAL EXAMINATION:   VITAL SIGNS:  There were no vitals taken for this visit.      GENERAL: NCAT, Sitting comfortably, NAD   EYES: EOMI, non-icteric, no proptosis   Ear/Nose/Throat: NCAT, no inflammation, no masses   LYMPH NODES: No LAD   CARDIOVASCULAR: S1 S2, RRR, No

## 2024-12-05 NOTE — PATIENT INSTRUCTIONS
blood sugar is in a safe target range.  Once your blood sugar is in a safe range, eat a snack or meal to prevent recurrent low blood sugar.  Make sure family, friends, and coworkers know the symptoms of low blood sugar and know how to get your sugar level up.  If you were prescribed glucagon, always have it with you. Make sure friends and family know how to use it.  When should you call for help?     Call 911 anytime you think you may need emergency care. For example, call if:    You passed out (lost consciousness).     You are confused or cannot think clearly.     Your blood sugar is very high or very low.     Watch closely for changes in your health, and be sure to contact your doctor if:    Your blood sugar stays outside the level your doctor set for you.     You have any problems.     -------------------------------------------------------------------------------------------------------    Before you drive:   -Check your blood glucose before driving.   -If it's low, treat the hypoglycemia and wait until you're at a safe level before driving.   -Keep fast-acting carbohydrates such as glucose tablets or a juice box and extra snacks in the car.   -If you start feeling low while you're driving, pull over safely and check your blood glucose. Checking your blood glucose or treating a high or low reading should not be done while driving.    -If low, don't begin driving again until you have treated it and your blood glucose is back to a safe level.  -In case of an emergency, having extra diabetes supplies such as a spare meter, test strips, and pump supplies in the car is a smart move.   -If you have one, wear your diabetes medical ID or have your Diabetes Alert Card in your wallet. These can help first responders treat you more quickly.

## 2024-12-11 ENCOUNTER — TELEPHONE (OUTPATIENT)
Age: 64
End: 2024-12-11

## 2024-12-11 NOTE — TELEPHONE ENCOUNTER
patient was prescribed Empagliflozin-Metformin. After insurance coverage, the patient’s copay is still $400 per month, which she is unable to afford. The patient does not require prior authorization, but the copay is a significant barrier to continued treatment.  Could you please advise if there are any patient assistance programs available for this medication, or if there are other resources that may help lower the patient’s out-of-pocket costs?

## 2025-02-05 DIAGNOSIS — K21.9 GASTROESOPHAGEAL REFLUX DISEASE WITHOUT ESOPHAGITIS: ICD-10-CM

## 2025-02-05 RX ORDER — GLIPIZIDE 10 MG/1
20 TABLET, FILM COATED, EXTENDED RELEASE ORAL DAILY
Qty: 180 TABLET | Refills: 3 | Status: SHIPPED | OUTPATIENT
Start: 2025-02-05

## 2025-02-05 RX ORDER — PANTOPRAZOLE SODIUM 40 MG/1
40 TABLET, DELAYED RELEASE ORAL
Qty: 90 TABLET | Refills: 1 | Status: SHIPPED | OUTPATIENT
Start: 2025-02-05

## 2025-02-05 NOTE — TELEPHONE ENCOUNTER
PCP: Karl Gifford APRN - NP    Last appt: 9/23/2024    Future Appointments   Date Time Provider Department Center   3/6/2025  9:10 AM Jaydon Vega MD [facility]   3/24/2025  9:30 AM [facility]       Requested Prescriptions     Pending Prescriptions Disp Refills    pantoprazole (PROTONIX) 40 MG tablet [Pharmacy Med Name: PANTOPRAZOLE SOD DR 40 MG TAB] 90 tablet 1     Sig: TAKE 1 TABLET BY MOUTH EVERY DAY BEFORE BREAKFAST

## 2025-03-20 ENCOUNTER — OFFICE VISIT (OUTPATIENT)
Age: 65
End: 2025-03-20
Payer: MEDICARE

## 2025-03-20 VITALS
DIASTOLIC BLOOD PRESSURE: 84 MMHG | WEIGHT: 181.2 LBS | HEART RATE: 80 BPM | BODY MASS INDEX: 28.44 KG/M2 | HEIGHT: 67 IN | SYSTOLIC BLOOD PRESSURE: 136 MMHG

## 2025-03-20 DIAGNOSIS — E11.9 TYPE 2 DIABETES MELLITUS WITHOUT COMPLICATION, WITH LONG-TERM CURRENT USE OF INSULIN: Primary | ICD-10-CM

## 2025-03-20 DIAGNOSIS — Z79.4 TYPE 2 DIABETES MELLITUS WITHOUT COMPLICATION, WITH LONG-TERM CURRENT USE OF INSULIN: Primary | ICD-10-CM

## 2025-03-20 DIAGNOSIS — E66.3 OVERWEIGHT: ICD-10-CM

## 2025-03-20 DIAGNOSIS — E78.2 MIXED HYPERLIPIDEMIA: ICD-10-CM

## 2025-03-20 LAB — HBA1C MFR BLD: 6.7 %

## 2025-03-20 PROCEDURE — 1159F MED LIST DOCD IN RCRD: CPT | Performed by: GENERAL ACUTE CARE HOSPITAL

## 2025-03-20 PROCEDURE — 1160F RVW MEDS BY RX/DR IN RCRD: CPT | Performed by: GENERAL ACUTE CARE HOSPITAL

## 2025-03-20 PROCEDURE — 1123F ACP DISCUSS/DSCN MKR DOCD: CPT | Performed by: GENERAL ACUTE CARE HOSPITAL

## 2025-03-20 PROCEDURE — 3075F SYST BP GE 130 - 139MM HG: CPT | Performed by: GENERAL ACUTE CARE HOSPITAL

## 2025-03-20 PROCEDURE — 83036 HEMOGLOBIN GLYCOSYLATED A1C: CPT | Performed by: GENERAL ACUTE CARE HOSPITAL

## 2025-03-20 PROCEDURE — 1126F AMNT PAIN NOTED NONE PRSNT: CPT | Performed by: GENERAL ACUTE CARE HOSPITAL

## 2025-03-20 PROCEDURE — 3044F HG A1C LEVEL LT 7.0%: CPT | Performed by: GENERAL ACUTE CARE HOSPITAL

## 2025-03-20 PROCEDURE — G2211 COMPLEX E/M VISIT ADD ON: HCPCS | Performed by: GENERAL ACUTE CARE HOSPITAL

## 2025-03-20 PROCEDURE — 3079F DIAST BP 80-89 MM HG: CPT | Performed by: GENERAL ACUTE CARE HOSPITAL

## 2025-03-20 PROCEDURE — 99214 OFFICE O/P EST MOD 30 MIN: CPT | Performed by: GENERAL ACUTE CARE HOSPITAL

## 2025-03-20 RX ORDER — METFORMIN HYDROCHLORIDE 500 MG/1
1000 TABLET, EXTENDED RELEASE ORAL 2 TIMES DAILY
COMMUNITY
Start: 2025-02-05

## 2025-03-20 RX ORDER — ACYCLOVIR 800 MG/1
TABLET ORAL
Qty: 2 EACH | Refills: 11 | Status: SHIPPED | OUTPATIENT
Start: 2025-03-20

## 2025-03-20 RX ORDER — PEN NEEDLE, DIABETIC 32GX 5/32"
NEEDLE, DISPOSABLE MISCELLANEOUS
Qty: 100 EACH | Refills: 11 | Status: SHIPPED | OUTPATIENT
Start: 2025-03-20

## 2025-03-20 RX ORDER — ISOPROPYL ALCOHOL 0.75 G/1
SWAB TOPICAL
Qty: 100 EACH | Refills: 11 | Status: SHIPPED | OUTPATIENT
Start: 2025-03-20

## 2025-03-20 RX ORDER — EMPAGLIFLOZIN, METFORMIN HYDROCHLORIDE 12.5; 1 MG/1; MG/1
TABLET, EXTENDED RELEASE ORAL
Qty: 28 TABLET | Refills: 0 | Status: SHIPPED | COMMUNITY
Start: 2025-03-20

## 2025-03-20 RX ORDER — CALCIUM CITRATE/VITAMIN D3 200MG-6.25
TABLET ORAL
Qty: 300 STRIP | Refills: 11 | Status: SHIPPED | OUTPATIENT
Start: 2025-03-20

## 2025-03-20 RX ORDER — GLIPIZIDE 10 MG/1
20 TABLET, FILM COATED, EXTENDED RELEASE ORAL DAILY
Qty: 180 TABLET | Refills: 3 | Status: SHIPPED | OUTPATIENT
Start: 2025-03-20

## 2025-03-20 RX ORDER — GLUCOSAM/CHON-MSM1/C/MANG/BOSW 500-416.6
TABLET ORAL
Qty: 200 EACH | Refills: 11 | Status: SHIPPED | OUTPATIENT
Start: 2025-03-20

## 2025-03-20 RX ORDER — PRAVASTATIN SODIUM 10 MG
10 TABLET ORAL
Qty: 90 TABLET | Refills: 3 | Status: SHIPPED | OUTPATIENT
Start: 2025-03-20

## 2025-03-20 RX ORDER — KETOROLAC TROMETHAMINE 30 MG/ML
1 INJECTION, SOLUTION INTRAMUSCULAR; INTRAVENOUS DAILY
Qty: 1 EACH | Refills: 0 | Status: SHIPPED | OUTPATIENT
Start: 2025-03-20

## 2025-03-20 NOTE — PROGRESS NOTES
17 for 2 weeks in mid 12/2022 and her sugars at that time went 400s. No steroids given. 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 Otherwise she is unsure why her BS control has not been sub optimum.      A1c improved from 9.3% 01/26/2023 to 7.5% 05/3/2023 8.2 10/16/23  7.0% 2/28/24        Diabetes History:   Diabetes was diagnosed: 10 yrs   Family History of diabetes is Positive  grandmother DM2   Hb A1c : 8.2%  10/26/2022  8.4%  06/2022,  9.3% 01/26/2023, 7.5% 05/3/2023 8.2% 10/16/2023   Review of most recent hemoglobin A1c :  Lab Results   Component Value Date    DWU6IYGE 6.7 03/20/2025    HNC8ZRBT 7.1 07/24/2024    MGC3RUTM 7.0 02/28/2024    TVM1VRXC 8.2 10/16/2023    PTT4VQYD 7.5 05/03/2023    SBD6FNRO 9.3 01/26/2023    LABA1C 7.9 (H) 11/12/2024    LABA1C 8.4 (H) 06/16/2022    LABA1C 8.0 (H) 11/18/2021    LABA1C 7.2 (H) 05/18/2021    LABA1C 12.2 (H) 02/03/2021       Diabetes-related Hospitalizations:denies      Review of home glucose:   As above      Hypoglycemia:no      Diet:   -3 meals   Breakfast = sausage and eggs, small pancake   Lunch = sandwich, fruit cup   Dinner = eats everything, chicken pork chops mashed fried potatoes, corn, green beans, sweet peas, canned greens, spinach   Snacks = potato chips, popcorn, occass ice cream sugar free, candy   Alexandrea = flav water, diet soda, iced/hot tea sweetner, coffee sweetener, creamer,       Physical Activity:   -moves a lot at work, works as manager at [employer]      Complications:   MI or CVA:No   PVD: No   Retinopathy:No     Last Ophthalm:diabetic eye exam [facility] center dr Mariano 04/2024, due next month   Nephropathy:No   Neuropathy:No      Last Podiatry:none   Gastropathy: No  Amputations: No         On a Statin:Yes pravastin 10mg    On an ACEI/ARB:Yes   On Aspirin:No   Smoker:No      Diabetes Education: not done      Review of most recent diabetes-related labs:  Lab Results   Component Value Date    SMP9BFXO 6.7 03/20/2025    CMZ1VPHW 7.1

## 2025-03-20 NOTE — PATIENT INSTRUCTIONS
range.  Once your blood sugar is in a safe range, eat a snack or meal to prevent recurrent low blood sugar.  Make sure family, friends, and coworkers know the symptoms of low blood sugar and know how to get your sugar level up.  If you were prescribed glucagon, always have it with you. Make sure friends and family know how to use it.  When should you call for help?     Call 911 anytime you think you may need emergency care. For example, call if:    You passed out (lost consciousness).     You are confused or cannot think clearly.     Your blood sugar is very high or very low.     Watch closely for changes in your health, and be sure to contact your doctor if:    Your blood sugar stays outside the level your doctor set for you.     You have any problems.     -------------------------------------------------------------------------------------------------------    Before you drive:   -Check your blood glucose before driving.   -If it's low, treat the hypoglycemia and wait until you're at a safe level before driving.   -Keep fast-acting carbohydrates such as glucose tablets or a juice box and extra snacks in the car.   -If you start feeling low while you're driving, pull over safely and check your blood glucose. Checking your blood glucose or treating a high or low reading should not be done while driving.    -If low, don't begin driving again until you have treated it and your blood glucose is back to a safe level.  -In case of an emergency, having extra diabetes supplies such as a spare meter, test strips, and pump supplies in the car is a smart move.   -If you have one, wear your diabetes medical ID or have your Diabetes Alert Card in your wallet. These can help first responders treat you more quickly.

## 2025-03-24 ENCOUNTER — OFFICE VISIT (OUTPATIENT)
Facility: CLINIC | Age: 65
End: 2025-03-24
Payer: MEDICARE

## 2025-03-24 VITALS
OXYGEN SATURATION: 100 % | HEART RATE: 73 BPM | HEIGHT: 67 IN | TEMPERATURE: 98.3 F | DIASTOLIC BLOOD PRESSURE: 74 MMHG | RESPIRATION RATE: 16 BRPM | WEIGHT: 183 LBS | BODY MASS INDEX: 28.72 KG/M2 | SYSTOLIC BLOOD PRESSURE: 126 MMHG

## 2025-03-24 DIAGNOSIS — I10 ESSENTIAL HYPERTENSION: ICD-10-CM

## 2025-03-24 DIAGNOSIS — E78.5 HYPERLIPIDEMIA DUE TO TYPE 2 DIABETES MELLITUS (HCC): Primary | ICD-10-CM

## 2025-03-24 DIAGNOSIS — E11.69 HYPERLIPIDEMIA DUE TO TYPE 2 DIABETES MELLITUS (HCC): Primary | ICD-10-CM

## 2025-03-24 DIAGNOSIS — Z79.899 ON STATIN THERAPY: ICD-10-CM

## 2025-03-24 DIAGNOSIS — E55.9 VITAMIN D DEFICIENCY: ICD-10-CM

## 2025-03-24 DIAGNOSIS — K21.9 GASTROESOPHAGEAL REFLUX DISEASE WITHOUT ESOPHAGITIS: ICD-10-CM

## 2025-03-24 PROCEDURE — 3044F HG A1C LEVEL LT 7.0%: CPT | Performed by: NURSE PRACTITIONER

## 2025-03-24 PROCEDURE — 3074F SYST BP LT 130 MM HG: CPT | Performed by: NURSE PRACTITIONER

## 2025-03-24 PROCEDURE — 1159F MED LIST DOCD IN RCRD: CPT | Performed by: NURSE PRACTITIONER

## 2025-03-24 PROCEDURE — 1160F RVW MEDS BY RX/DR IN RCRD: CPT | Performed by: NURSE PRACTITIONER

## 2025-03-24 PROCEDURE — 3078F DIAST BP <80 MM HG: CPT | Performed by: NURSE PRACTITIONER

## 2025-03-24 PROCEDURE — 99214 OFFICE O/P EST MOD 30 MIN: CPT | Performed by: NURSE PRACTITIONER

## 2025-03-24 PROCEDURE — 1126F AMNT PAIN NOTED NONE PRSNT: CPT | Performed by: NURSE PRACTITIONER

## 2025-03-24 PROCEDURE — 1123F ACP DISCUSS/DSCN MKR DOCD: CPT | Performed by: NURSE PRACTITIONER

## 2025-03-24 SDOH — ECONOMIC STABILITY: FOOD INSECURITY: WITHIN THE PAST 12 MONTHS, YOU WORRIED THAT YOUR FOOD WOULD RUN OUT BEFORE YOU GOT MONEY TO BUY MORE.: NEVER TRUE

## 2025-03-24 SDOH — ECONOMIC STABILITY: FOOD INSECURITY: WITHIN THE PAST 12 MONTHS, THE FOOD YOU BOUGHT JUST DIDN'T LAST AND YOU DIDN'T HAVE MONEY TO GET MORE.: NEVER TRUE

## 2025-03-24 ASSESSMENT — PATIENT HEALTH QUESTIONNAIRE - PHQ9
SUM OF ALL RESPONSES TO PHQ QUESTIONS 1-9: 0
2. FEELING DOWN, DEPRESSED OR HOPELESS: NOT AT ALL
SUM OF ALL RESPONSES TO PHQ QUESTIONS 1-9: 0
SUM OF ALL RESPONSES TO PHQ QUESTIONS 1-9: 0
1. LITTLE INTEREST OR PLEASURE IN DOING THINGS: NOT AT ALL
SUM OF ALL RESPONSES TO PHQ QUESTIONS 1-9: 0

## 2025-03-24 NOTE — PROGRESS NOTES
Chief Complaint   Patient presents with    Diabetes     4M       SUBJECTIVE:    Edwige Zhang is a 64 y.o. female who is here today for a follow up appointment regarding current medical conditions including: Type 2 diabetes with hyperlipidemia, essential hypertension, GERD, and vitamin D deficiency.    The patient is currently being managed for type 2 diabetes.  She had previously been seeing endocrinology, but her current provider will be leaving the practice at the beginning of June.  She would like for me to assume her care in this area.  She is presently taking a combination of glipizide, Lantus, Synjardy, metformin, and Lantus.  She is using a continuous glucose monitor-freestyle jayy 3.  She has had no hypoglycemic episodes.  Her last hemoglobin A1c was approximately 6.9%.    She is currently on pravastatin daily for management of her hyperlipidemia.  She is tolerating the medication well.  She takes enalapril and triamterene/HCTZ daily for management of her hypertension.  Her blood pressure remained stable and in good control.  She denies any adverse side effects to the medication.  She denies any recent episodes of chest pain, chest pressure, shortness of breath, headaches, dizziness, blurred vision, palpitations, or syncope episodes.    She remains on pantoprazole daily for management of GERD.  Her symptoms have been stable and well-controlled.  She has a history of vitamin D deficiency.  She states she has been off of her vitamin D supplementation due to vitamin D present in her Prevagen supplement.    Current Outpatient Medications   Medication Sig Dispense Refill    metFORMIN (GLUCOPHAGE-XR) 500 MG extended release tablet Take 2 tablets by mouth in the morning and at bedtime      Continuous Glucose  (FREESTYLE JAYY 3 READER) LUANN Inject 1 Device into the skin daily Use with Freestyle jayy 3 sensors, change sensors every 14 days, E 11.65 1 each 0    Continuous Glucose Sensor (FREESTYLE

## 2025-03-24 NOTE — PROGRESS NOTES
Edwige Zhang is a 64 y.o. female     Chief Complaint   Patient presents with    Diabetes     4M       /74   Pulse 73   Temp 98.3 °F (36.8 °C) (Oral)   Resp 16   Ht 1.702 m (5' 7\")   Wt 83 kg (183 lb)   SpO2 100%   BMI 28.66 kg/m²     Health Maintenance Due   Topic Date Due    DEXA (modify frequency per FRAX score)  Never done    Respiratory Syncytial Virus (RSV) Pregnant or age 60 yrs+ (1 - Risk 60-74 years 1-dose series) Never done    Diabetic retinal exam  12/31/2020    Pneumococcal 50+ years Vaccine (2 of 2 - PCV) 11/20/2021    Diabetic foot exam  06/06/2023    Annual Wellness Visit (Medicare Advantage)  01/01/2025    COVID-19 Vaccine (6 - 2024-25 season) 01/05/2025         \"Have you been to the ER, urgent care clinic since your last visit?  Hospitalized since your last visit?\"    NO    “Have you seen or consulted any other health care providers outside of [facility] since your last visit?”    NO

## 2025-03-25 ENCOUNTER — RESULTS FOLLOW-UP (OUTPATIENT)
Facility: CLINIC | Age: 65
End: 2025-03-25

## 2025-03-25 DIAGNOSIS — Z78.0 POST-MENOPAUSAL: Primary | ICD-10-CM

## 2025-03-25 LAB
25(OH)D3+25(OH)D2 SERPL-MCNC: 49.3 NG/ML (ref 30–100)
ALBUMIN SERPL-MCNC: 4.5 G/DL (ref 3.9–4.9)
ALP SERPL-CCNC: 94 IU/L (ref 44–121)
ALT SERPL-CCNC: 8 IU/L (ref 0–32)
AST SERPL-CCNC: 14 IU/L (ref 0–40)
BASOPHILS # BLD AUTO: 0 X10E3/UL (ref 0–0.2)
BASOPHILS NFR BLD AUTO: 1 %
BILIRUB SERPL-MCNC: 0.3 MG/DL (ref 0–1.2)
BUN SERPL-MCNC: 26 MG/DL (ref 8–27)
BUN/CREAT SERPL: 24 (ref 12–28)
CALCIUM SERPL-MCNC: 9.4 MG/DL (ref 8.7–10.3)
CHLORIDE SERPL-SCNC: 101 MMOL/L (ref 96–106)
CHOLEST SERPL-MCNC: 134 MG/DL (ref 100–199)
CO2 SERPL-SCNC: 23 MMOL/L (ref 20–29)
CREAT SERPL-MCNC: 1.09 MG/DL (ref 0.57–1)
EGFRCR SERPLBLD CKD-EPI 2021: 56 ML/MIN/1.73
EOSINOPHIL # BLD AUTO: 0.1 X10E3/UL (ref 0–0.4)
EOSINOPHIL NFR BLD AUTO: 1 %
ERYTHROCYTE [DISTWIDTH] IN BLOOD BY AUTOMATED COUNT: 13 % (ref 11.7–15.4)
GLOBULIN SER CALC-MCNC: 2.4 G/DL (ref 1.5–4.5)
GLUCOSE SERPL-MCNC: 89 MG/DL (ref 70–99)
HCT VFR BLD AUTO: 33.1 % (ref 34–46.6)
HDLC SERPL-MCNC: 52 MG/DL
HGB BLD-MCNC: 10.5 G/DL (ref 11.1–15.9)
IMM GRANULOCYTES # BLD AUTO: 0 X10E3/UL (ref 0–0.1)
IMM GRANULOCYTES NFR BLD AUTO: 1 %
LDLC SERPL CALC-MCNC: 66 MG/DL (ref 0–99)
LYMPHOCYTES # BLD AUTO: 1.4 X10E3/UL (ref 0.7–3.1)
LYMPHOCYTES NFR BLD AUTO: 32 %
MCH RBC QN AUTO: 28.3 PG (ref 26.6–33)
MCHC RBC AUTO-ENTMCNC: 31.7 G/DL (ref 31.5–35.7)
MCV RBC AUTO: 89 FL (ref 79–97)
MONOCYTES # BLD AUTO: 0.4 X10E3/UL (ref 0.1–0.9)
MONOCYTES NFR BLD AUTO: 9 %
NEUTROPHILS # BLD AUTO: 2.4 X10E3/UL (ref 1.4–7)
NEUTROPHILS NFR BLD AUTO: 56 %
PLATELET # BLD AUTO: 390 X10E3/UL (ref 150–450)
POTASSIUM SERPL-SCNC: 4.2 MMOL/L (ref 3.5–5.2)
PROT SERPL-MCNC: 6.9 G/DL (ref 6–8.5)
RBC # BLD AUTO: 3.71 X10E6/UL (ref 3.77–5.28)
SODIUM SERPL-SCNC: 140 MMOL/L (ref 134–144)
TRIGL SERPL-MCNC: 84 MG/DL (ref 0–149)
VLDLC SERPL CALC-MCNC: 16 MG/DL (ref 5–40)
WBC # BLD AUTO: 4.2 X10E3/UL (ref 3.4–10.8)

## 2025-04-04 DIAGNOSIS — I10 ESSENTIAL (PRIMARY) HYPERTENSION: ICD-10-CM

## 2025-04-04 DIAGNOSIS — E11.65 TYPE 2 DIABETES MELLITUS WITH HYPERGLYCEMIA (HCC): ICD-10-CM

## 2025-04-04 RX ORDER — TRIAMTERENE AND HYDROCHLOROTHIAZIDE 37.5; 25 MG/1; MG/1
1 TABLET ORAL DAILY
Qty: 90 TABLET | Refills: 1 | Status: SHIPPED | OUTPATIENT
Start: 2025-04-04

## 2025-04-04 RX ORDER — ENALAPRIL MALEATE 10 MG/1
10 TABLET ORAL DAILY
Qty: 90 TABLET | Refills: 1 | Status: SHIPPED | OUTPATIENT
Start: 2025-04-04

## 2025-04-04 NOTE — TELEPHONE ENCOUNTER
PCP: Karl Gifford APRN - NP    Last appt: 3/24/2025    Future Appointments   Date Time Provider Department Center   5/14/2025  8:00 AM [facility] DEXA 1 [facility]   9/25/2025  8:00 AM Karl Gifford APRN - NP PC[facility] DEP       Requested Prescriptions     Pending Prescriptions Disp Refills    enalapril (VASOTEC) 10 MG tablet [Pharmacy Med Name: ENALAPRIL MALEATE 10 MG TAB] 90 tablet 1     Sig: TAKE 1 TABLET BY MOUTH EVERY DAY    triamterene-hydroCHLOROthiazide (MAXZIDE-25) 37.5-25 MG per tablet [Pharmacy Med Name: TRIAMTERENE-HCTZ 37.5-25 MG TB] 90 tablet 1     Sig: TAKE 1 TABLET BY MOUTH EVERY DAY

## 2025-05-14 ENCOUNTER — HOSPITAL ENCOUNTER (OUTPATIENT)
Facility: HOSPITAL | Age: 65
Discharge: HOME | End: 2025-05-17
Payer: MEDICARE

## 2025-05-14 DIAGNOSIS — Z78.0 POST-MENOPAUSAL: ICD-10-CM

## 2025-05-14 PROCEDURE — 77080 DXA BONE DENSITY AXIAL: CPT

## 2025-05-19 ENCOUNTER — RESULTS FOLLOW-UP (OUTPATIENT)
Facility: CLINIC | Age: 65
End: 2025-05-19

## 2025-05-19 DIAGNOSIS — Z78.0 OSTEOPENIA AFTER MENOPAUSE: Primary | ICD-10-CM

## 2025-05-19 DIAGNOSIS — M85.80 OSTEOPENIA AFTER MENOPAUSE: Primary | ICD-10-CM

## 2025-05-20 RX ORDER — ALENDRONATE SODIUM 35 MG/1
35 TABLET ORAL
Qty: 12 TABLET | Refills: 1 | Status: SHIPPED | OUTPATIENT
Start: 2025-05-20

## 2025-05-20 NOTE — TELEPHONE ENCOUNTER
----- Message from PAVITHRA ESTES LPN sent at 5/20/2025  1:40 PM EDT -----  Spoke to patient and provided them with the results.    Patient would like to get started on the medication please send it to [facility] on [address]

## 2025-09-05 DIAGNOSIS — K21.9 GASTROESOPHAGEAL REFLUX DISEASE WITHOUT ESOPHAGITIS: ICD-10-CM

## 2025-09-05 RX ORDER — PANTOPRAZOLE SODIUM 40 MG/1
40 TABLET, DELAYED RELEASE ORAL
Qty: 90 TABLET | Refills: 1 | Status: SHIPPED | OUTPATIENT
Start: 2025-09-05